# Patient Record
Sex: MALE | Race: WHITE | Employment: FULL TIME | URBAN - METROPOLITAN AREA
[De-identification: names, ages, dates, MRNs, and addresses within clinical notes are randomized per-mention and may not be internally consistent; named-entity substitution may affect disease eponyms.]

---

## 2017-01-12 ENCOUNTER — OFFICE VISIT (OUTPATIENT)
Dept: INTERNAL MEDICINE CLINIC | Facility: CLINIC | Age: 42
End: 2017-01-12

## 2017-01-12 VITALS
SYSTOLIC BLOOD PRESSURE: 120 MMHG | RESPIRATION RATE: 16 BRPM | OXYGEN SATURATION: 98 % | HEART RATE: 104 BPM | HEIGHT: 68 IN | BODY MASS INDEX: 27.28 KG/M2 | TEMPERATURE: 98 F | WEIGHT: 180 LBS | DIASTOLIC BLOOD PRESSURE: 80 MMHG

## 2017-01-12 DIAGNOSIS — J01.90 ACUTE SINUSITIS, RECURRENCE NOT SPECIFIED, UNSPECIFIED LOCATION: Primary | ICD-10-CM

## 2017-01-12 PROCEDURE — 99213 OFFICE O/P EST LOW 20 MIN: CPT | Performed by: INTERNAL MEDICINE

## 2017-01-12 RX ORDER — LEVOFLOXACIN 500 MG/1
500 TABLET, FILM COATED ORAL DAILY
Qty: 10 TABLET | Refills: 0 | Status: SHIPPED | OUTPATIENT
Start: 2017-01-12 | End: 2017-02-07 | Stop reason: ALTCHOICE

## 2017-01-12 NOTE — PROGRESS NOTES
Juan R Gorman is a 35year old male.   HPI:   CC: cough since Monday  Purulent drainage in phlegm  Ok sleeping with cough  Nasal congestion+  No headache  + fatigued  Hx recurrent sinus infections  Co-workers and son sick  Parents visiting from [facility] [place][other] [OTHER] Maternal Grandfather    • Cancer Maternal Grandfather      breast cancer        REVIEW OF SYSTEMS:   GENERAL HEALTH:   HEENT: no ear pain/pressure, no sore throat, cough occasional  RESPIRATORY: denies shortness of breath   CARDIOVASCULAR

## 2017-02-07 ENCOUNTER — OFFICE VISIT (OUTPATIENT)
Dept: INTERNAL MEDICINE CLINIC | Facility: CLINIC | Age: 42
End: 2017-02-07

## 2017-02-07 VITALS
SYSTOLIC BLOOD PRESSURE: 120 MMHG | OXYGEN SATURATION: 98 % | WEIGHT: 180 LBS | RESPIRATION RATE: 12 BRPM | DIASTOLIC BLOOD PRESSURE: 70 MMHG | BODY MASS INDEX: 27 KG/M2 | HEART RATE: 93 BPM | TEMPERATURE: 98 F

## 2017-02-07 DIAGNOSIS — R05.9 COUGH: Primary | ICD-10-CM

## 2017-02-07 PROCEDURE — 99213 OFFICE O/P EST LOW 20 MIN: CPT | Performed by: INTERNAL MEDICINE

## 2017-02-07 RX ORDER — HYDROCODONE BITARTRATE AND HOMATROPINE METHYLBROMIDE ORAL SOLUTION 5; 1.5 MG/5ML; MG/5ML
5 LIQUID ORAL EVERY 6 HOURS PRN
Qty: 180 ML | Refills: 0 | Status: SHIPPED | OUTPATIENT
Start: 2017-02-07 | End: 2017-04-20 | Stop reason: ALTCHOICE

## 2017-02-07 NOTE — PROGRESS NOTES
Luis Avalos is a 35year old male.   HPI:   CC: felt sick Sunday night  Feels like breathing under water  Chest congestion  Coughing up slight blood tinged  Using flonase regularly  Slight sob  Wife sick 1 week ago      ALL:No Known Allergies    Current Ou Maternal Grandfather      breast cancer        REVIEW OF SYSTEMS:   GENERAL HEALTH:  HEENT: no ear pain/pressure, no sore throat, cough+  RESPIRATORY: slight  shortness of breath with exertion  CARDIO: no chest pain  GI: denies abdominal pain, no N/V/D  NE

## 2017-02-10 ENCOUNTER — OFFICE VISIT (OUTPATIENT)
Dept: INTERNAL MEDICINE CLINIC | Facility: CLINIC | Age: 42
End: 2017-02-10

## 2017-02-10 VITALS
BODY MASS INDEX: 27.28 KG/M2 | DIASTOLIC BLOOD PRESSURE: 70 MMHG | TEMPERATURE: 98 F | WEIGHT: 180 LBS | SYSTOLIC BLOOD PRESSURE: 100 MMHG | HEART RATE: 88 BPM | OXYGEN SATURATION: 99 % | HEIGHT: 68 IN | RESPIRATION RATE: 12 BRPM

## 2017-02-10 DIAGNOSIS — J01.90 ACUTE SINUSITIS, RECURRENCE NOT SPECIFIED, UNSPECIFIED LOCATION: Primary | ICD-10-CM

## 2017-02-10 PROCEDURE — 99213 OFFICE O/P EST LOW 20 MIN: CPT | Performed by: PHYSICIAN ASSISTANT

## 2017-02-10 RX ORDER — AMOXICILLIN AND CLAVULANATE POTASSIUM 875; 125 MG/1; MG/1
1 TABLET, FILM COATED ORAL 2 TIMES DAILY
Qty: 20 TABLET | Refills: 0 | Status: SHIPPED | OUTPATIENT
Start: 2017-02-10 | End: 2017-02-20

## 2017-02-10 NOTE — PROGRESS NOTES
Brielle Leiva is a 35year old male  Patient presents with:  Sinus Problem      HPI:   Pt here today for f/u, was seen a few days ago and diagnosed with cough and discharged with instructions to continue with supportive care and start hydromet.    - pt state with other cholecystitis, without mention of obstruction     Thrombosed external hemorrhoid        REVIEW OF SYSTEMS:   GENERAL HEALTH: as noted above  SKIN: denies any unusual skin lesions or rashes  HEENT: as noted above  RESPIRATORY: as noted above   CA

## 2017-04-20 ENCOUNTER — OFFICE VISIT (OUTPATIENT)
Dept: INTERNAL MEDICINE CLINIC | Facility: CLINIC | Age: 42
End: 2017-04-20

## 2017-04-20 ENCOUNTER — TELEPHONE (OUTPATIENT)
Dept: INTERNAL MEDICINE CLINIC | Facility: CLINIC | Age: 42
End: 2017-04-20

## 2017-04-20 VITALS
RESPIRATION RATE: 12 BRPM | SYSTOLIC BLOOD PRESSURE: 120 MMHG | OXYGEN SATURATION: 99 % | HEART RATE: 85 BPM | WEIGHT: 184 LBS | TEMPERATURE: 98 F | DIASTOLIC BLOOD PRESSURE: 80 MMHG | HEIGHT: 68 IN | BODY MASS INDEX: 27.89 KG/M2

## 2017-04-20 DIAGNOSIS — G47.33 OSA ON CPAP: ICD-10-CM

## 2017-04-20 DIAGNOSIS — J01.91 ACUTE RECURRENT SINUSITIS, UNSPECIFIED LOCATION: Primary | ICD-10-CM

## 2017-04-20 DIAGNOSIS — Z99.89 OSA ON CPAP: ICD-10-CM

## 2017-04-20 PROCEDURE — 99213 OFFICE O/P EST LOW 20 MIN: CPT | Performed by: INTERNAL MEDICINE

## 2017-04-20 RX ORDER — MOXIFLOXACIN HYDROCHLORIDE 400 MG/1
400 TABLET ORAL DAILY
Qty: 14 TABLET | Refills: 0 | Status: SHIPPED | OUTPATIENT
Start: 2017-04-20 | End: 2017-06-30 | Stop reason: ALTCHOICE

## 2017-04-20 NOTE — PROGRESS NOTES
Ashleigh Torres is a 35year old male. 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   Question if cpap in • [place][other] [OTHER] Maternal Grandfather    • Cancer Maternal Grandfather      breast cancer        REVIEW OF SYSTEMS:   GENERAL HEALTH:  HEENT: no ear pain/pressure, no sore throat,no cough  RESPIRATORY: denies shortness of breath with exertion  CARDIO:

## 2017-04-20 NOTE — TELEPHONE ENCOUNTER
Spoke with pt. States that he read the package insert about side effects of the ATB and he is concerned about joint damage. Advised that if pt has no pre-existing joint problem that he is less likely to have that side effect.   Advised that pt should take p

## 2017-04-20 NOTE — TELEPHONE ENCOUNTER
Patient is concerned about the side effects he read about concerning the antibiotic Dr Grisel Leon prescribed.

## 2017-05-05 ENCOUNTER — HOSPITAL ENCOUNTER (OUTPATIENT)
Dept: CT IMAGING | Facility: HOSPITAL | Age: 42
Discharge: HOME | End: 2017-05-05
Attending: INTERNAL MEDICINE
Payer: COMMERCIAL

## 2017-05-05 DIAGNOSIS — G47.33 OSA ON CPAP: ICD-10-CM

## 2017-05-05 DIAGNOSIS — Z99.89 OSA ON CPAP: ICD-10-CM

## 2017-05-05 DIAGNOSIS — J01.91 ACUTE RECURRENT SINUSITIS, UNSPECIFIED LOCATION: ICD-10-CM

## 2017-05-05 PROCEDURE — 70486 CT MAXILLOFACIAL W/O DYE: CPT | Performed by: INTERNAL MEDICINE

## 2017-06-28 ENCOUNTER — TELEPHONE (OUTPATIENT)
Dept: INTERNAL MEDICINE CLINIC | Facility: CLINIC | Age: 42
End: 2017-06-28

## 2017-06-28 NOTE — TELEPHONE ENCOUNTER
Pt dropped off  Pre- participation physical Form requiring provider completion and signature. Last OV 4/20/17 for sinusitis. Last CPX 7/26/16--return for CPX 1 year. No future OV scheduled. We will call pt ([phone]) when completed.     Copy of immuniz

## 2017-06-28 NOTE — TELEPHONE ENCOUNTER
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 The form is due Friday, patient said he would like a printed copy of immunization record as well (or at least the date of his last tetanus).  Patient was in offic

## 2017-06-29 NOTE — TELEPHONE ENCOUNTER
He needs to come in for a physical  His insurance should cover once a year  His last exam July 2016  He needs to bring in the other page

## 2017-06-30 ENCOUNTER — OFFICE VISIT (OUTPATIENT)
Dept: INTERNAL MEDICINE CLINIC | Facility: CLINIC | Age: 42
End: 2017-06-30

## 2017-06-30 ENCOUNTER — LABORATORY ENCOUNTER (OUTPATIENT)
Dept: LAB | Age: 42
End: 2017-06-30
Attending: PHYSICIAN ASSISTANT
Payer: COMMERCIAL

## 2017-06-30 VITALS
HEART RATE: 83 BPM | BODY MASS INDEX: 28.34 KG/M2 | DIASTOLIC BLOOD PRESSURE: 80 MMHG | WEIGHT: 187 LBS | HEIGHT: 68 IN | TEMPERATURE: 98 F | SYSTOLIC BLOOD PRESSURE: 120 MMHG | OXYGEN SATURATION: 99 % | RESPIRATION RATE: 12 BRPM

## 2017-06-30 DIAGNOSIS — Z00.00 ANNUAL PHYSICAL EXAM: Primary | ICD-10-CM

## 2017-06-30 DIAGNOSIS — Z00.00 LABORATORY EXAM ORDERED AS PART OF ROUTINE GENERAL MEDICAL EXAMINATION: ICD-10-CM

## 2017-06-30 LAB
ALBUMIN SERPL-MCNC: 4 G/DL (ref 3.5–4.8)
ALP LIVER SERPL-CCNC: 82 U/L (ref 45–117)
ALT SERPL-CCNC: 59 U/L (ref 17–63)
AST SERPL-CCNC: 26 U/L (ref 15–41)
BASOPHILS # BLD AUTO: 0.07 X10(3) UL (ref 0–0.1)
BASOPHILS NFR BLD AUTO: 0.9 %
BILIRUB SERPL-MCNC: 0.5 MG/DL (ref 0.1–2)
BUN BLD-MCNC: 16 MG/DL (ref 8–20)
CALCIUM BLD-MCNC: 9 MG/DL (ref 8.3–10.3)
CHLORIDE: 105 MMOL/L (ref 101–111)
CHOLEST SMN-MCNC: 159 MG/DL (ref ?–200)
CO2: 29 MMOL/L (ref 22–32)
CREAT BLD-MCNC: 1.11 MG/DL (ref 0.7–1.3)
EOSINOPHIL # BLD AUTO: 0.23 X10(3) UL (ref 0–0.3)
EOSINOPHIL NFR BLD AUTO: 2.8 %
ERYTHROCYTE [DISTWIDTH] IN BLOOD BY AUTOMATED COUNT: 13.2 % (ref 11.5–16)
GLUCOSE BLD-MCNC: 76 MG/DL (ref 70–99)
HCT VFR BLD AUTO: 46.9 % (ref 37–53)
HDLC SERPL-MCNC: 49 MG/DL (ref 45–?)
HDLC SERPL: 3.24 {RATIO} (ref ?–4.97)
HGB BLD-MCNC: 15.3 G/DL (ref 13–17)
IMMATURE GRANULOCYTE COUNT: 0.07 X10(3) UL (ref 0–1)
IMMATURE GRANULOCYTE RATIO %: 0.9 %
LDLC SERPL CALC-MCNC: 69 MG/DL (ref ?–130)
LYMPHOCYTES # BLD AUTO: 1.66 X10(3) UL (ref 0.9–4)
LYMPHOCYTES NFR BLD AUTO: 20.2 %
M PROTEIN MFR SERPL ELPH: 7.8 G/DL (ref 6.1–8.3)
MCH RBC QN AUTO: 29.2 PG (ref 27–33.2)
MCHC RBC AUTO-ENTMCNC: 32.6 G/DL (ref 31–37)
MCV RBC AUTO: 89.5 FL (ref 80–99)
MONOCYTES # BLD AUTO: 0.83 X10(3) UL (ref 0.1–0.6)
MONOCYTES NFR BLD AUTO: 10.1 %
NEUTROPHIL ABS PRELIM: 5.34 X10 (3) UL (ref 1.3–6.7)
NEUTROPHILS # BLD AUTO: 5.34 X10(3) UL (ref 1.3–6.7)
NEUTROPHILS NFR BLD AUTO: 65.1 %
NONHDLC SERPL-MCNC: 110 MG/DL (ref ?–130)
PLATELET # BLD AUTO: 239 10(3)UL (ref 150–450)
POTASSIUM SERPL-SCNC: 4.2 MMOL/L (ref 3.6–5.1)
RBC # BLD AUTO: 5.24 X10(6)UL (ref 4.3–5.7)
RED CELL DISTRIBUTION WIDTH-SD: 43.1 FL (ref 35.1–46.3)
SODIUM SERPL-SCNC: 139 MMOL/L (ref 136–144)
TRIGLYCERIDES: 205 MG/DL (ref ?–150)
VLDL: 41 MG/DL (ref 5–40)
WBC # BLD AUTO: 8.2 X10(3) UL (ref 4–13)

## 2017-06-30 PROCEDURE — 80053 COMPREHEN METABOLIC PANEL: CPT | Performed by: PHYSICIAN ASSISTANT

## 2017-06-30 PROCEDURE — 85025 COMPLETE CBC W/AUTO DIFF WBC: CPT | Performed by: PHYSICIAN ASSISTANT

## 2017-06-30 PROCEDURE — 36415 COLL VENOUS BLD VENIPUNCTURE: CPT | Performed by: PHYSICIAN ASSISTANT

## 2017-06-30 PROCEDURE — 80061 LIPID PANEL: CPT | Performed by: PHYSICIAN ASSISTANT

## 2017-06-30 PROCEDURE — 99396 PREV VISIT EST AGE 40-64: CPT | Performed by: PHYSICIAN ASSISTANT

## 2017-06-30 NOTE — PROGRESS NOTES
Rimma Roby is a 35year old male who presents for a complete physical exam.   HPI:   Pt here today for annual physical. 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   Exercise:none  Last colonoscopy none last PSA none     REVIEW OF SYSTEMS:   GENERAL: fee Visit on 02/26/2016   Component Date Value Ref Range Status   • STREP GRP A CUL-SCR 02/26/2016 Negative  Negative Final   • Control Line Present with a clear * 02/26/2016 yes  Yes/No Final   • Kit Lot # 02/26/2016 [id number]  Numeric Final   • Kit Expirati

## 2017-10-26 ENCOUNTER — OFFICE VISIT (OUTPATIENT)
Dept: INTERNAL MEDICINE CLINIC | Facility: CLINIC | Age: 42
End: 2017-10-26

## 2017-10-26 VITALS
WEIGHT: 185 LBS | TEMPERATURE: 98 F | SYSTOLIC BLOOD PRESSURE: 120 MMHG | HEART RATE: 92 BPM | HEIGHT: 68 IN | BODY MASS INDEX: 28.04 KG/M2 | OXYGEN SATURATION: 98 % | DIASTOLIC BLOOD PRESSURE: 80 MMHG

## 2017-10-26 DIAGNOSIS — J06.9 ACUTE URI: Primary | ICD-10-CM

## 2017-10-26 PROCEDURE — 99213 OFFICE O/P EST LOW 20 MIN: CPT | Performed by: NURSE PRACTITIONER

## 2017-10-26 RX ORDER — AMOXICILLIN AND CLAVULANATE POTASSIUM 875; 125 MG/1; MG/1
1 TABLET, FILM COATED ORAL 2 TIMES DAILY
Qty: 20 TABLET | Refills: 0 | Status: SHIPPED | OUTPATIENT
Start: 2017-10-26 | End: 2017-11-05

## 2017-10-26 NOTE — PROGRESS NOTES
Patient presents with:  Sinus Problem      HPI:  Presents with approx 4 day history of ear pressure bilaterally, sore throat, fatigue, facial pain and sinus pressure. Stated has had intermittent non-productive cough.  Denies fever/chills, nasal drainage, SO (36.7 °C) (Oral)   Ht 68\"   Wt 185 lb   SpO2 98%   BMI 28.13 kg/m²   Constitutional:  No distress. HEENT: Normocephalic and atraumatic. Right TM erythematous w/o bulge or air/fluid level. Left TM erythematous with bulge and purulent appearing fluid.  Fredick Primas don't want to use one of the above sinus washes:    Try a premixed saline nasal spray, available over the counter, such as Ocean Nasal Spray, 4 times daily (may use every 2-3 hours if desired). Do two sprays each nostril and gently blow nose after.      Al

## 2017-11-09 ENCOUNTER — IMMUNIZATION (OUTPATIENT)
Dept: INTERNAL MEDICINE CLINIC | Facility: CLINIC | Age: 42
End: 2017-11-09

## 2017-11-09 DIAGNOSIS — Z23 NEED FOR VACCINATION: ICD-10-CM

## 2017-11-09 PROCEDURE — 90471 IMMUNIZATION ADMIN: CPT | Performed by: INTERNAL MEDICINE

## 2017-11-09 PROCEDURE — 90686 IIV4 VACC NO PRSV 0.5 ML IM: CPT | Performed by: INTERNAL MEDICINE

## 2018-06-21 ENCOUNTER — OFFICE VISIT (OUTPATIENT)
Dept: INTERNAL MEDICINE CLINIC | Facility: CLINIC | Age: 43
End: 2018-06-21

## 2018-06-21 VITALS
BODY MASS INDEX: 28.22 KG/M2 | HEART RATE: 68 BPM | DIASTOLIC BLOOD PRESSURE: 76 MMHG | TEMPERATURE: 98 F | WEIGHT: 186.19 LBS | HEIGHT: 68 IN | RESPIRATION RATE: 12 BRPM | SYSTOLIC BLOOD PRESSURE: 108 MMHG

## 2018-06-21 DIAGNOSIS — K76.0 FATTY LIVER: ICD-10-CM

## 2018-06-21 DIAGNOSIS — Z13.228 SCREENING FOR METABOLIC DISORDER: ICD-10-CM

## 2018-06-21 DIAGNOSIS — Z00.00 ROUTINE PHYSICAL EXAMINATION: Primary | ICD-10-CM

## 2018-06-21 DIAGNOSIS — Z13.0 SCREENING FOR BLOOD DISEASE: ICD-10-CM

## 2018-06-21 DIAGNOSIS — Z13.220 SCREENING FOR LIPID DISORDERS: ICD-10-CM

## 2018-06-21 DIAGNOSIS — Z12.5 SCREENING FOR PROSTATE CANCER: ICD-10-CM

## 2018-06-21 DIAGNOSIS — Z13.29 SCREENING FOR THYROID DISORDER: ICD-10-CM

## 2018-06-21 PROBLEM — G47.33 OSA ON CPAP: Status: ACTIVE | Noted: 2018-06-21

## 2018-06-21 PROBLEM — Z99.89 OSA ON CPAP: Status: ACTIVE | Noted: 2018-06-21

## 2018-06-21 PROBLEM — Z80.0 FAMILY HISTORY OF COLON CANCER: Status: ACTIVE | Noted: 2018-06-21

## 2018-06-21 PROCEDURE — 99396 PREV VISIT EST AGE 40-64: CPT | Performed by: NURSE PRACTITIONER

## 2018-06-21 NOTE — PROGRESS NOTES
Siri Hedrick is a 38year old male who presents for a complete physical exam.     HPI:   Pt has no acute complaints. Feeling well in his usual state of health. Needs form completion for participation in [facility] [place] [employer].     Wt Readings from Last 6 [place] date: ADENOIDECTOMY  No date: FEMUR/KNEE SURG UNLISTED      Comment: right knee  3/4/2015: LAPAROSCOPIC CHOLECYSTECTOMY N/A      Comment: Procedure: LAPAROSCOPIC CHOLECYSTECTOMY;                 Surgeon: Shahid Schultz MD;  Location: [facility] orthopnea. GI: denies abdominal pain, heartburn, diarrhea or constipation. : denies dysuria, frequency, urgency, hematuria, changes in stream or nocturia. MUSCULOSKELETAL: denies back pain. NEURO: denies headaches, dizziness, syncope.   PSYCH: denies CBC  Screening for metabolic disorder- CMP  Screening for lipid disorders- lipid  Screening for thyroid disorder- TSH  Screening for prostate cancer- PSA      Orders Placed This Encounter      CBC W Differential W Platelet [E]      Comp Metabolic Panel (14

## 2018-09-10 ENCOUNTER — OFFICE VISIT (OUTPATIENT)
Dept: FAMILY MEDICINE CLINIC | Facility: CLINIC | Age: 43
End: 2018-09-10
Payer: COMMERCIAL

## 2018-09-10 VITALS
WEIGHT: 170 LBS | DIASTOLIC BLOOD PRESSURE: 68 MMHG | HEIGHT: 68 IN | TEMPERATURE: 98 F | BODY MASS INDEX: 25.76 KG/M2 | HEART RATE: 73 BPM | RESPIRATION RATE: 16 BRPM | SYSTOLIC BLOOD PRESSURE: 108 MMHG | OXYGEN SATURATION: 99 %

## 2018-09-10 DIAGNOSIS — H65.192 OTHER ACUTE NONSUPPURATIVE OTITIS MEDIA OF LEFT EAR, RECURRENCE NOT SPECIFIED: ICD-10-CM

## 2018-09-10 DIAGNOSIS — J01.00 ACUTE MAXILLARY SINUSITIS, RECURRENCE NOT SPECIFIED: Primary | ICD-10-CM

## 2018-09-10 PROCEDURE — 99213 OFFICE O/P EST LOW 20 MIN: CPT | Performed by: NURSE PRACTITIONER

## 2018-09-10 RX ORDER — AMOXICILLIN AND CLAVULANATE POTASSIUM 875; 125 MG/1; MG/1
1 TABLET, FILM COATED ORAL 2 TIMES DAILY
Qty: 20 TABLET | Refills: 0 | Status: SHIPPED | OUTPATIENT
Start: 2018-09-10 | End: 2018-09-20

## 2018-09-10 NOTE — PROGRESS NOTES
CHIEF COMPLAINT:   Patient presents with:  Cough: cough is with phlegm, sinus pressure, nose congestion, drainage x 4 dys       HPI:   Siri Hedrick is a 37year old male who presents for sinus congestion for 4-5 days.   Symptoms have been worsening si breast cancer   • Other ([place]) Maternal Grandfather       Social History    Tobacco Use      Smoking status: Never Smoker      Smokeless tobacco: Never Used    Alcohol use: Yes      Comment: 1 beer per month    Drug use: No        REVIEW OF SYSTEMS:   G PLAN: Meds and instructions as below. Comfort care instructions as listed in Patient Instructions. To f/u if no improvement in 3-5 days or sooner if sx worsen. Risks, benefits, side effects of medication addressed and explained.     Meds & Refills for th Your healthcare provider will ask about your symptoms and look at your eardrum.    Your healthcare provider may check for fluid in the ear using a light called an otoscope to look into the ear canal. A puff of air is blown into the ear and your provider loo If you have discharge from your ear, you can wipe it away with a washcloth and loosely plug the ear with cotton to catch further drainage. If you have a lot of fluid and pus draining from your ear, the eardrum has probably ruptured.  Ask your healthcare pro The sinuses are air-filled spaces within the bones of the face. They connect to the inside of the nose. Sinusitis is an inflammation of the tissue that lines the sinuses. Sinusitis can occur during a cold.  It can also happen due to allergies to pollens and · Do not use nasal rinses or irrigation during an acute sinus infection, unless your healthcare provider tells you to. Rinsing may spread the infection to other areas in your sinuses.   · Use acetaminophen or ibuprofen to control pain, unless another pain m

## 2018-09-10 NOTE — PATIENT INSTRUCTIONS
· It is very important to complete full course of antibiotic. Take with food.    · Rest and fluids  · Continue Flonase and Claritin as packet insert  · Acetaminophen or ibuprofen according to package instructions as needed for pain  · Call or return if sym of ear infections often go away in a couple of days without antibiotics. Bacteria can become resistant to antibiotics, and the medicine may cause side effects.  For these reasons, your healthcare provider may wait 1 to 3 days to see if the symptoms go away provider if you can take aspirin, acetaminophen, or ibuprofen to control your fever. Anyone under the age of 24 with a viral illness should not take aspirin because of the increased risk of Reye's syndrome. Keep a daily record of your temperature.    Call tea, and other liquids. This may help thin nasal mucus. It also may help your sinuses drain fluids. · Heat may help soothe painful areas of your face. Use a towel soaked in hot water. Or,  the shower and direct the warm spray onto your face.  Using headache that gets worse  · Stiff neck  · Unusual drowsiness or confusion  · Swelling of your forehead or eyelids  · Vision problems, such as blurred or double vision  · Fever of 100.4ºF (38ºC) or higher, or as directed by your healthcare provider  · [employer]

## 2018-09-21 ENCOUNTER — TELEPHONE (OUTPATIENT)
Dept: INTERNAL MEDICINE CLINIC | Facility: CLINIC | Age: 43
End: 2018-09-21

## 2018-09-21 ENCOUNTER — HOSPITAL ENCOUNTER (OUTPATIENT)
Age: 43
Discharge: HOME | End: 2018-09-21
Attending: FAMILY MEDICINE
Payer: COMMERCIAL

## 2018-09-21 VITALS
SYSTOLIC BLOOD PRESSURE: 115 MMHG | WEIGHT: 170 LBS | HEIGHT: 68 IN | DIASTOLIC BLOOD PRESSURE: 80 MMHG | HEART RATE: 66 BPM | OXYGEN SATURATION: 97 % | BODY MASS INDEX: 25.76 KG/M2 | RESPIRATION RATE: 18 BRPM | TEMPERATURE: 98 F

## 2018-09-21 DIAGNOSIS — B08.4 HAND, FOOT AND MOUTH DISEASE: Primary | ICD-10-CM

## 2018-09-21 LAB — POCT RAPID STREP: NEGATIVE

## 2018-09-21 PROCEDURE — 87081 CULTURE SCREEN ONLY: CPT | Performed by: FAMILY MEDICINE

## 2018-09-21 PROCEDURE — 87430 STREP A AG IA: CPT | Performed by: FAMILY MEDICINE

## 2018-09-21 PROCEDURE — 99203 OFFICE O/P NEW LOW 30 MIN: CPT

## 2018-09-21 PROCEDURE — 99214 OFFICE O/P EST MOD 30 MIN: CPT

## 2018-09-21 NOTE — ED PROVIDER NOTES
Patient Seen in: [facility]    History   Patient presents with:  Sore Throat  Fatigue (constitutional, neurologic)    Stated Complaint: fatigue, sore throat, chills, rash on fingers x4 days    HPI    52-year-old gentleman w MAIN OR  3/4/2015: LAPAROSCOPIC CHOLECYSTECTOMY; N/A      Comment:  Performed by Beatrice Freedman MD at [facility] MAIN OR  No date: SINUS SURGERY      Family history reviewed and is not pertinent to presenting problem.     Social History    Tobacco Use      Smok viral–hand-foot-and-mouth disease. Throat culture will be sent.             Disposition and Plan     Clinical Impression:  Hand, foot and mouth disease  (primary encounter diagnosis)    Disposition:  Discharge  9/21/2018 10:02 am    Follow-up:  Maira Norris

## 2018-09-21 NOTE — ED INITIAL ASSESSMENT (HPI)
Pt arrived alert and responsive. Pt c/o sensitivity to fingers and toes. Pt concerned about a blister on his 3rd  finger right hand  Pt states he's had a sore throat off and on since Monday. Pt denies sore throat today.  Pt was treated for an ear infection

## 2018-09-21 NOTE — TELEPHONE ENCOUNTER
Attempted to call the patient back, but noticed he's at the  IC now for the below complaint (see encounters from 09/21/18).  LM for the patient we attempted to get a hold of him, but noticed he was at the [address] across the [place] and informed him t

## 2018-09-21 NOTE — TELEPHONE ENCOUNTER
Patient was treated for an ear infection last week (finished antibiotics on Wednesday). He thinks he had a virus starting Monday of this week. Now his fingertips are painful and have small cuts on them. He also has painful spots on his feet.   He is wond

## 2018-10-11 ENCOUNTER — IMMUNIZATION (OUTPATIENT)
Dept: INTERNAL MEDICINE CLINIC | Facility: CLINIC | Age: 43
End: 2018-10-11
Payer: COMMERCIAL

## 2018-10-11 DIAGNOSIS — Z23 NEED FOR VACCINATION: ICD-10-CM

## 2018-10-11 PROCEDURE — 90471 IMMUNIZATION ADMIN: CPT | Performed by: INTERNAL MEDICINE

## 2018-10-11 PROCEDURE — 90686 IIV4 VACC NO PRSV 0.5 ML IM: CPT | Performed by: INTERNAL MEDICINE

## 2019-01-25 ENCOUNTER — OFFICE VISIT (OUTPATIENT)
Dept: INTERNAL MEDICINE CLINIC | Facility: CLINIC | Age: 44
End: 2019-01-25
Payer: COMMERCIAL

## 2019-01-25 VITALS
OXYGEN SATURATION: 99 % | HEART RATE: 89 BPM | SYSTOLIC BLOOD PRESSURE: 128 MMHG | RESPIRATION RATE: 14 BRPM | TEMPERATURE: 99 F | WEIGHT: 184.19 LBS | HEIGHT: 68 IN | DIASTOLIC BLOOD PRESSURE: 80 MMHG | BODY MASS INDEX: 27.92 KG/M2

## 2019-01-25 DIAGNOSIS — J01.01 ACUTE RECURRENT MAXILLARY SINUSITIS: Primary | ICD-10-CM

## 2019-01-25 PROBLEM — G47.33 OSA (OBSTRUCTIVE SLEEP APNEA): Status: ACTIVE | Noted: 2018-06-21

## 2019-01-25 PROCEDURE — 99213 OFFICE O/P EST LOW 20 MIN: CPT | Performed by: STUDENT IN AN ORGANIZED HEALTH CARE EDUCATION/TRAINING PROGRAM

## 2019-01-25 RX ORDER — PREDNISONE 20 MG/1
20 TABLET ORAL 2 TIMES DAILY
Qty: 14 TABLET | Refills: 0 | Status: SHIPPED | OUTPATIENT
Start: 2019-01-25 | End: 2019-02-01

## 2019-01-25 RX ORDER — AMOXICILLIN AND CLAVULANATE POTASSIUM 875; 125 MG/1; MG/1
1 TABLET, FILM COATED ORAL 2 TIMES DAILY
Qty: 20 TABLET | Refills: 0 | Status: SHIPPED | OUTPATIENT
Start: 2019-01-25 | End: 2019-02-04

## 2019-01-25 NOTE — PROGRESS NOTES
HPI:    Patient ID: Zara Watkins is a 37year old male. This is a new patient to me. Patient's medications, allergies, past medical, surgical, social and family histories were reviewed and updated as appropriate.     Sinus Problem   This is a recur 2 sprays daily  Disp:  Rfl: 0   loratadine (CLARITIN) 10 MG Oral Tab Take 10 mg by mouth as needed. Disp:  Rfl:      Allergies:No Known Allergies   PHYSICAL EXAM:   Physical Exam   Constitutional: He is oriented to person, place, and time.  He appears well- This Visit:  Requested Prescriptions     Signed Prescriptions Disp Refills   • Amoxicillin-Pot Clavulanate 875-125 MG Oral Tab 20 tablet 0     Sig: Take 1 tablet by mouth 2 (two) times daily for 10 days.    • predniSONE 20 MG Oral Tab 14 tablet 0     Sig: T

## 2019-01-25 NOTE — PATIENT INSTRUCTIONS
Self-Care for Sinusitis     Drinking plenty of water can help sinuses drain. Sinusitis can often be managed with self-care. Self-care can keep sinuses moist and make you feel more comfortable. Remember to follow your doctor's instructions closely.  This Colds, flu, and allergies make it more likely for you to get sinusitis. Do your best to prevent sinusitis by preventing these problems. Do what you can to avoid getting colds and other infections. Stay away from things that cause allergies (allergens).  Berhane Rodrigez · Stay away from all types of smoke, which dries out sinus linings. This includes tobacco smoke and chemical smoke in workplace settings. · Use saltwater rinses. Date Last Reviewed: 10/1/2016  © 8272-7803 [employer] [address].  [facility]

## 2019-06-04 ENCOUNTER — OFFICE VISIT (OUTPATIENT)
Dept: INTERNAL MEDICINE CLINIC | Facility: CLINIC | Age: 44
End: 2019-06-04
Payer: COMMERCIAL

## 2019-06-04 VITALS
DIASTOLIC BLOOD PRESSURE: 66 MMHG | SYSTOLIC BLOOD PRESSURE: 110 MMHG | WEIGHT: 188.81 LBS | RESPIRATION RATE: 14 BRPM | TEMPERATURE: 98 F | HEIGHT: 68 IN | BODY MASS INDEX: 28.61 KG/M2 | HEART RATE: 77 BPM | OXYGEN SATURATION: 97 %

## 2019-06-04 DIAGNOSIS — Z00.00 ROUTINE PHYSICAL EXAMINATION: Primary | ICD-10-CM

## 2019-06-04 DIAGNOSIS — Z80.0 FAMILY HISTORY OF COLON CANCER: ICD-10-CM

## 2019-06-04 PROCEDURE — 99396 PREV VISIT EST AGE 40-64: CPT | Performed by: NURSE PRACTITIONER

## 2019-06-04 NOTE — PROGRESS NOTES
Steff Mccarthy is a 37year old male who presents for a complete physical exam.     HPI:   Pt has no acute complaints. Does have history of early onset colon cancer in maternal grandmother and mother had UC requiring colon resection.  Denies abd pain, N • FEMUR/KNEE SURG UNLISTED      right knee   • LAPAROSCOPIC CHOLECYSTECTOMY N/A 3/4/2015    Performed by Jamie Curran MD at [facility] MAIN OR   • SINUS SURGERY          Family History   Problem Relation Age of Onset   • Other (colon ca) Maternal Grandmot exertion, palpitations, edema, orthopnea. GI: as above  : denies dysuria, frequency, urgency, hematuria, changes in stream or nocturia. MUSCULOSKELETAL: denies back pain. NEURO: denies headaches, dizziness, syncope.   PSYCH: denies difficulty concentr discussed importance of routine exercise especially as he gets closer to 48 and continues to participate in [employer] and [employer] activities. Encouraged to be active for at least 30 minutes daily.  Discussed eating a healthy diet and watching portion

## 2019-07-11 ENCOUNTER — OFFICE VISIT (OUTPATIENT)
Dept: SURGERY | Facility: CLINIC | Age: 44
End: 2019-07-11
Payer: COMMERCIAL

## 2019-07-11 VITALS
BODY MASS INDEX: 28.49 KG/M2 | HEIGHT: 68 IN | TEMPERATURE: 99 F | DIASTOLIC BLOOD PRESSURE: 96 MMHG | SYSTOLIC BLOOD PRESSURE: 133 MMHG | WEIGHT: 188 LBS | HEART RATE: 75 BPM

## 2019-07-11 DIAGNOSIS — Z12.11 ENCOUNTER FOR COLONOSCOPY IN PATIENT WITH FAMILY HISTORY OF COLON CANCER: Primary | ICD-10-CM

## 2019-07-11 DIAGNOSIS — Z83.79 FAMILY HISTORY OF ULCERATIVE COLITIS: ICD-10-CM

## 2019-07-11 DIAGNOSIS — Z80.0 ENCOUNTER FOR COLONOSCOPY IN PATIENT WITH FAMILY HISTORY OF COLON CANCER: Primary | ICD-10-CM

## 2019-07-11 PROCEDURE — S0285 CNSLT BEFORE SCREEN COLONOSC: HCPCS | Performed by: SURGERY

## 2019-07-11 RX ORDER — POLYETHYLENE GLYCOL 3350, SODIUM CHLORIDE, SODIUM BICARBONATE, POTASSIUM CHLORIDE 420; 11.2; 5.72; 1.48 G/4L; G/4L; G/4L; G/4L
POWDER, FOR SOLUTION ORAL
Qty: 1 BOTTLE | Refills: 0 | Status: SHIPPED | OUTPATIENT
Start: 2019-07-11 | End: 2019-09-16 | Stop reason: ALTCHOICE

## 2019-07-11 NOTE — H&P
New Patient Visit Note       Active Problems      1. Encounter for colonoscopy in patient with family history of colon cancer    2.  Family history of ulcerative colitis        Chief Complaint   Patient presents with:  Colonoscopy: pt is here for first colo disorder of initiating or maintaining wakefulness    • Ulnar nerve abnormality    • Unspecified sleep apnea    • URI (upper respiratory infection) 11/2011   • Visual impairment     wears glasses for distance     Past Surgical History:   Procedure Lateralit today.  Review of Systems    Physical Findings   BP (!) 133/96   Pulse 75   Temp 98.6 °F (37 °C) (Oral)   Ht 68\"   Wt 188 lb   BMI 28.59 kg/m²   Physical Exam   Constitutional: The patient is oriented to person, place, and time.  The patient appears well-d MD

## 2019-07-11 NOTE — H&P
New Patient Visit Note       Active Problems      No diagnosis found. Chief Complaint   Patient presents with:  Colonoscopy: pt is here for colonscopy consult      History of Present Illness         Allergies  Johnna Barr has No Known Allergies.     Past Medica status: Never Smoker      Smokeless tobacco: Never Used    Substance and Sexual Activity      Alcohol use: Yes        Comment: 1 beer per month      Drug use: No    Other Topics      Concerns:        Caffeine Concern: Yes          soda        Exercise: Yes

## 2019-08-20 ENCOUNTER — OFFICE VISIT (OUTPATIENT)
Dept: SURGERY | Facility: CLINIC | Age: 44
End: 2019-08-20
Payer: COMMERCIAL

## 2019-08-20 VITALS
WEIGHT: 180 LBS | HEIGHT: 68 IN | SYSTOLIC BLOOD PRESSURE: 136 MMHG | TEMPERATURE: 98 F | HEART RATE: 82 BPM | DIASTOLIC BLOOD PRESSURE: 84 MMHG | BODY MASS INDEX: 27.28 KG/M2

## 2019-08-20 DIAGNOSIS — Z12.11 ENCOUNTER FOR COLONOSCOPY IN PATIENT WITH FAMILY HISTORY OF COLON CANCER: Primary | ICD-10-CM

## 2019-08-20 DIAGNOSIS — Z80.0 ENCOUNTER FOR COLONOSCOPY IN PATIENT WITH FAMILY HISTORY OF COLON CANCER: Primary | ICD-10-CM

## 2019-08-20 PROCEDURE — S0285 CNSLT BEFORE SCREEN COLONOSC: HCPCS | Performed by: SURGERY

## 2019-08-20 NOTE — PROGRESS NOTES
Follow Up Visit Note       Active Problems      1.  Encounter for colonoscopy in patient with family history of colon cancer          Chief Complaint   Patient presents with:  Consult: est pt - to meet RRP before c-scope         History of Present Illness Grandfather         breast cancer   • Other (MI) Maternal Grandfather      Social History    Socioeconomic History      Marital status:       Spouse name: Not on file      Number of children: Not on file      Years of education: Not on file      Hig Hematological: Negative for adenopathy. Does not bruise/bleed easily. Psychiatric/Behavioral: Negative for behavioral problems and sleep disturbance.         Physical Findings   /84   Pulse 82   Temp 97.7 °F (36.5 °C) (Oral)   Ht 68\"   Wt 180 lb

## 2019-09-13 ENCOUNTER — LAB ENCOUNTER (OUTPATIENT)
Dept: LAB | Age: 44
End: 2019-09-13
Attending: NURSE PRACTITIONER
Payer: COMMERCIAL

## 2019-09-13 DIAGNOSIS — Z00.00 ROUTINE PHYSICAL EXAMINATION: ICD-10-CM

## 2019-09-13 LAB
ALBUMIN SERPL-MCNC: 3.8 G/DL (ref 3.4–5)
ALBUMIN/GLOB SERPL: 1.3 {RATIO} (ref 1–2)
ALP LIVER SERPL-CCNC: 95 U/L (ref 45–117)
ALT SERPL-CCNC: 49 U/L (ref 16–61)
ANION GAP SERPL CALC-SCNC: 6 MMOL/L (ref 0–18)
AST SERPL-CCNC: 24 U/L (ref 15–37)
BASOPHILS # BLD AUTO: 0.06 X10(3) UL (ref 0–0.2)
BASOPHILS NFR BLD AUTO: 0.7 %
BILIRUB SERPL-MCNC: 0.4 MG/DL (ref 0.1–2)
BUN BLD-MCNC: 20 MG/DL (ref 7–18)
BUN/CREAT SERPL: 19 (ref 10–20)
CALCIUM BLD-MCNC: 8 MG/DL (ref 8.5–10.1)
CHLORIDE SERPL-SCNC: 108 MMOL/L (ref 98–112)
CHOLEST SMN-MCNC: 152 MG/DL (ref ?–200)
CO2 SERPL-SCNC: 26 MMOL/L (ref 21–32)
COMPLEXED PSA SERPL-MCNC: 0.72 NG/ML (ref ?–4)
CREAT BLD-MCNC: 1.05 MG/DL (ref 0.7–1.3)
DEPRECATED RDW RBC AUTO: 41.5 FL (ref 35.1–46.3)
EOSINOPHIL # BLD AUTO: 0.32 X10(3) UL (ref 0–0.7)
EOSINOPHIL NFR BLD AUTO: 3.6 %
ERYTHROCYTE [DISTWIDTH] IN BLOOD BY AUTOMATED COUNT: 12.8 % (ref 11–15)
GLOBULIN PLAS-MCNC: 3 G/DL (ref 2.8–4.4)
GLUCOSE BLD-MCNC: 95 MG/DL (ref 70–99)
HCT VFR BLD AUTO: 42.2 % (ref 39–53)
HDLC SERPL-MCNC: 43 MG/DL (ref 40–59)
HGB BLD-MCNC: 14.5 G/DL (ref 13–17.5)
IMM GRANULOCYTES # BLD AUTO: 0.1 X10(3) UL (ref 0–1)
IMM GRANULOCYTES NFR BLD: 1.1 %
LDLC SERPL CALC-MCNC: 72 MG/DL (ref ?–100)
LYMPHOCYTES # BLD AUTO: 2.1 X10(3) UL (ref 1–4)
LYMPHOCYTES NFR BLD AUTO: 23.8 %
M PROTEIN MFR SERPL ELPH: 6.8 G/DL (ref 6.4–8.2)
MCH RBC QN AUTO: 30.3 PG (ref 26–34)
MCHC RBC AUTO-ENTMCNC: 34.4 G/DL (ref 31–37)
MCV RBC AUTO: 88.3 FL (ref 80–100)
MONOCYTES # BLD AUTO: 0.77 X10(3) UL (ref 0.1–1)
MONOCYTES NFR BLD AUTO: 8.7 %
NEUTROPHILS # BLD AUTO: 5.49 X10 (3) UL (ref 1.5–7.7)
NEUTROPHILS # BLD AUTO: 5.49 X10(3) UL (ref 1.5–7.7)
NEUTROPHILS NFR BLD AUTO: 62.1 %
NONHDLC SERPL-MCNC: 109 MG/DL (ref ?–130)
OSMOLALITY SERPL CALC.SUM OF ELEC: 292 MOSM/KG (ref 275–295)
PLATELET # BLD AUTO: 246 10(3)UL (ref 150–450)
POTASSIUM SERPL-SCNC: 3.9 MMOL/L (ref 3.5–5.1)
RBC # BLD AUTO: 4.78 X10(6)UL (ref 4.3–5.7)
SODIUM SERPL-SCNC: 140 MMOL/L (ref 136–145)
TRIGL SERPL-MCNC: 184 MG/DL (ref 30–149)
TSI SER-ACNC: 1.27 MIU/ML (ref 0.36–3.74)
VLDLC SERPL CALC-MCNC: 37 MG/DL (ref 0–30)
WBC # BLD AUTO: 8.8 X10(3) UL (ref 4–11)

## 2019-09-13 PROCEDURE — 36415 COLL VENOUS BLD VENIPUNCTURE: CPT | Performed by: NURSE PRACTITIONER

## 2019-09-13 PROCEDURE — 80061 LIPID PANEL: CPT | Performed by: NURSE PRACTITIONER

## 2019-09-13 PROCEDURE — 80050 GENERAL HEALTH PANEL: CPT | Performed by: NURSE PRACTITIONER

## 2019-09-13 PROCEDURE — 84153 ASSAY OF PSA TOTAL: CPT | Performed by: NURSE PRACTITIONER

## 2019-09-16 ENCOUNTER — OFFICE VISIT (OUTPATIENT)
Dept: INTERNAL MEDICINE CLINIC | Facility: CLINIC | Age: 44
End: 2019-09-16
Payer: COMMERCIAL

## 2019-09-16 VITALS
OXYGEN SATURATION: 97 % | RESPIRATION RATE: 14 BRPM | HEIGHT: 68 IN | WEIGHT: 180.81 LBS | HEART RATE: 79 BPM | DIASTOLIC BLOOD PRESSURE: 62 MMHG | TEMPERATURE: 99 F | SYSTOLIC BLOOD PRESSURE: 100 MMHG | BODY MASS INDEX: 27.4 KG/M2

## 2019-09-16 DIAGNOSIS — J01.00 ACUTE MAXILLARY SINUSITIS, RECURRENCE NOT SPECIFIED: Primary | ICD-10-CM

## 2019-09-16 PROBLEM — G56.03 BILATERAL CARPAL TUNNEL SYNDROME: Status: ACTIVE | Noted: 2019-06-30

## 2019-09-16 PROCEDURE — 99213 OFFICE O/P EST LOW 20 MIN: CPT | Performed by: STUDENT IN AN ORGANIZED HEALTH CARE EDUCATION/TRAINING PROGRAM

## 2019-09-16 RX ORDER — PREDNISONE 20 MG/1
20 TABLET ORAL 2 TIMES DAILY
Qty: 14 TABLET | Refills: 0 | Status: SHIPPED | OUTPATIENT
Start: 2019-09-16 | End: 2019-09-23

## 2019-09-16 RX ORDER — AMOXICILLIN AND CLAVULANATE POTASSIUM 875; 125 MG/1; MG/1
1 TABLET, FILM COATED ORAL 2 TIMES DAILY
Qty: 20 TABLET | Refills: 0 | Status: SHIPPED | OUTPATIENT
Start: 2019-09-16 | End: 2019-09-26

## 2019-09-16 NOTE — PROGRESS NOTES
HPI:    Patient ID: Nereida Quijano is a 53year old male. Sinus Problem   This is a new problem. Episode onset: 3 days ago. The problem has been gradually worsening since onset. Maximum temperature: subjective fevers.  His pain is at a severity of 4/ person, place, and time. He appears well-developed and well-nourished. HENT:   Head: Normocephalic and atraumatic. Right Ear: Hearing, tympanic membrane, external ear and ear canal normal. No tenderness. Tympanic membrane is not erythematous.  No middle worsen. No orders of the defined types were placed in this encounter.       Meds This Visit:  Requested Prescriptions     Signed Prescriptions Disp Refills   • Amoxicillin-Pot Clavulanate 875-125 MG Oral Tab 20 tablet 0     Sig: Take 1 tablet by mouth

## 2019-09-17 ENCOUNTER — PATIENT MESSAGE (OUTPATIENT)
Dept: INTERNAL MEDICINE CLINIC | Facility: CLINIC | Age: 44
End: 2019-09-17

## 2019-09-17 NOTE — TELEPHONE ENCOUNTER
From: Maximus Luis  To: Cristobal Aquino MD  Sent: 9/17/2019 1:48 PM CDT  Subject: Test Results Question    I have a question about LIPID PANEL resulted on 9/13/19, 11:56 AM.    What should I know about these results?  Is there anything I should pay at

## 2019-09-17 NOTE — TELEPHONE ENCOUNTER
From: Zara Watkins  To: Geraldine Arango MD  Sent: 9/17/2019 1:48 PM CDT  Subject: Test Results Question    I have a question about COMP METABOLIC PANEL (14) resulted on 9/13/19, 11:56 AM.    What should I know about these results?  Is there anything I

## 2019-09-17 NOTE — TELEPHONE ENCOUNTER
From: Pearle Sport  To: Seamj Valdes MD  Sent: 9/17/2019 1:46 PM CDT  Subject: Test Results Question    I have a question about PSA SCREEN resulted on 9/13/19, 11:56 AM.    What should I know about these results?  Is there anything I should pay att

## 2019-09-17 NOTE — TELEPHONE ENCOUNTER
From: Jeremías Arguello  To: Jensen Avila MD  Sent: 9/17/2019 1:48 PM CDT  Subject: Test Results Question    I have a question about CBC W/ DIFFERENTIAL resulted on 9/13/19, 11:40 AM.    What should I know about these results?  Is there anything I shoul

## 2019-09-17 NOTE — TELEPHONE ENCOUNTER
From: Alli Amos  To: Ruby Shea MD  Sent: 9/17/2019 1:47 PM CDT  Subject: Test Results Question    I have a question about TSH W REFLEX TO FREE T4 resulted on 9/13/19, 11:56 AM.    What should I know about these results?  Is there anything I s

## 2019-11-12 ENCOUNTER — IMMUNIZATION (OUTPATIENT)
Dept: INTERNAL MEDICINE CLINIC | Facility: CLINIC | Age: 44
End: 2019-11-12
Payer: COMMERCIAL

## 2019-11-12 DIAGNOSIS — Z23 NEED FOR VACCINATION: ICD-10-CM

## 2019-11-12 PROCEDURE — 90471 IMMUNIZATION ADMIN: CPT | Performed by: STUDENT IN AN ORGANIZED HEALTH CARE EDUCATION/TRAINING PROGRAM

## 2019-11-12 PROCEDURE — 90686 IIV4 VACC NO PRSV 0.5 ML IM: CPT | Performed by: STUDENT IN AN ORGANIZED HEALTH CARE EDUCATION/TRAINING PROGRAM

## 2020-01-10 ENCOUNTER — OFFICE VISIT (OUTPATIENT)
Dept: INTERNAL MEDICINE CLINIC | Facility: CLINIC | Age: 45
End: 2020-01-10
Payer: COMMERCIAL

## 2020-01-10 VITALS
HEART RATE: 78 BPM | BODY MASS INDEX: 28.98 KG/M2 | HEIGHT: 68 IN | RESPIRATION RATE: 16 BRPM | SYSTOLIC BLOOD PRESSURE: 122 MMHG | WEIGHT: 191.19 LBS | OXYGEN SATURATION: 98 % | TEMPERATURE: 98 F | DIASTOLIC BLOOD PRESSURE: 78 MMHG

## 2020-01-10 DIAGNOSIS — H65.92 LEFT NON-SUPPURATIVE OTITIS MEDIA: Primary | ICD-10-CM

## 2020-01-10 PROCEDURE — 99213 OFFICE O/P EST LOW 20 MIN: CPT | Performed by: NURSE PRACTITIONER

## 2020-01-10 RX ORDER — COVID-19 ANTIGEN TEST
220 KIT MISCELLANEOUS AS NEEDED
COMMUNITY
End: 2020-07-13

## 2020-01-10 RX ORDER — FLUTICASONE PROPIONATE 50 MCG
2 SPRAY, SUSPENSION (ML) NASAL DAILY
Qty: 1 BOTTLE | Refills: 3 | Status: SHIPPED | OUTPATIENT
Start: 2020-01-10 | End: 2020-01-12

## 2020-01-10 RX ORDER — AMOXICILLIN AND CLAVULANATE POTASSIUM 875; 125 MG/1; MG/1
1 TABLET, FILM COATED ORAL 2 TIMES DAILY
Qty: 20 TABLET | Refills: 0 | Status: SHIPPED | OUTPATIENT
Start: 2020-01-10 | End: 2020-01-20

## 2020-01-10 NOTE — PATIENT INSTRUCTIONS
Take antibiotic completely as ordered     Take antibiotic with food    Eat yogurt twice a day while on antibiotic     Monitor for diarrhea    F/U in 2 weeks for an ear check

## 2020-01-10 NOTE — PROGRESS NOTES
Larissa Shearer is a 53year old male. CHIEF COMPLAINT   Runny nose, PND, chest pressure, bilateral ear pain    HPI:   The patient's symptoms began 2 days ago with runny nose, PND and nasal/sinus congestion.  He reports ear pain and pressure to both ear (Oral)   Resp 16   Ht 68\"   Wt 191 lb 3.2 oz (86.7 kg)   SpO2 98%   BMI 29.07 kg/m²   Body mass index is 29.07 kg/m².    GENERAL: well developed, well nourished,in no apparent distress  HEENT: atraumatic, normocephalic,left TM erythemic and bulging, right and was irrigated then visualized successfully. Bilateral TM erythema and Left TM bulging.    - Continue flonase. Start augmentin. - f/u in 2 weeks for a ear check. The patient indicates understanding of these issues and agrees to the plan.   The patie

## 2020-03-17 ENCOUNTER — TELEPHONE (OUTPATIENT)
Dept: INTERNAL MEDICINE CLINIC | Facility: CLINIC | Age: 45
End: 2020-03-17

## 2020-03-17 NOTE — TELEPHONE ENCOUNTER
Headache, exhaustion, chills, hot flashes, cough, no fever.  Traveled to [facility] 3/2-3/3  Pt thinking maybe COVID   Please advise  Thank you

## 2020-03-17 NOTE — TELEPHONE ENCOUNTER
ELLY to Dr. Chele Johnson    Symptoms started on March 13. He traveled to [address] [place] [address] from March 2-3 for business meetings. He has a history of recurrent sinusitis.   He reports that initially, he had some cough with postnasal drip which have now improved, but he continu

## 2020-03-17 NOTE — TELEPHONE ENCOUNTER
Called and spoke with patient. Patient reports travel to [place], [place] March 2-3, 2020. Patient reports symptoms began Friday.  He reports exhaustion, body aches, headache not relieved by ibuprofen 400mg this morning, decreased appetite, sinus con

## 2020-03-21 ENCOUNTER — APPOINTMENT (OUTPATIENT)
Dept: CT IMAGING | Facility: HOSPITAL | Age: 45
DRG: 195 | End: 2020-03-21
Attending: PHYSICIAN ASSISTANT
Payer: COMMERCIAL

## 2020-03-21 ENCOUNTER — HOSPITAL ENCOUNTER (INPATIENT)
Facility: HOSPITAL | Age: 45
LOS: 3 days | Discharge: HOME | DRG: 195 | End: 2020-03-24
Attending: EMERGENCY MEDICINE | Admitting: INTERNAL MEDICINE
Payer: COMMERCIAL

## 2020-03-21 ENCOUNTER — APPOINTMENT (OUTPATIENT)
Dept: GENERAL RADIOLOGY | Facility: HOSPITAL | Age: 45
DRG: 195 | End: 2020-03-21
Attending: PHYSICIAN ASSISTANT
Payer: COMMERCIAL

## 2020-03-21 DIAGNOSIS — J18.9 PRIMARY ATYPICAL PNEUMONIA: Primary | ICD-10-CM

## 2020-03-21 DIAGNOSIS — J12.9 VIRAL PNEUMONIA: ICD-10-CM

## 2020-03-21 DIAGNOSIS — R09.02 HYPOXIA: ICD-10-CM

## 2020-03-21 PROCEDURE — 71045 X-RAY EXAM CHEST 1 VIEW: CPT | Performed by: PHYSICIAN ASSISTANT

## 2020-03-21 PROCEDURE — 99223 1ST HOSP IP/OBS HIGH 75: CPT | Performed by: INTERNAL MEDICINE

## 2020-03-21 PROCEDURE — 71275 CT ANGIOGRAPHY CHEST: CPT | Performed by: PHYSICIAN ASSISTANT

## 2020-03-21 RX ORDER — POTASSIUM CHLORIDE 20 MEQ/1
40 TABLET, EXTENDED RELEASE ORAL ONCE
Status: COMPLETED | OUTPATIENT
Start: 2020-03-21 | End: 2020-03-21

## 2020-03-21 RX ORDER — HEPARIN SODIUM 5000 [USP'U]/ML
5000 INJECTION, SOLUTION INTRAVENOUS; SUBCUTANEOUS EVERY 8 HOURS SCHEDULED
Status: DISCONTINUED | OUTPATIENT
Start: 2020-03-21 | End: 2020-03-24

## 2020-03-21 RX ORDER — ACETAMINOPHEN 325 MG/1
650 TABLET ORAL EVERY 6 HOURS PRN
Status: DISCONTINUED | OUTPATIENT
Start: 2020-03-21 | End: 2020-03-24

## 2020-03-21 RX ORDER — ONDANSETRON 2 MG/ML
4 INJECTION INTRAMUSCULAR; INTRAVENOUS EVERY 6 HOURS PRN
Status: DISCONTINUED | OUTPATIENT
Start: 2020-03-21 | End: 2020-03-24

## 2020-03-21 RX ORDER — HYDROMORPHONE HYDROCHLORIDE 1 MG/ML
0.5 INJECTION, SOLUTION INTRAMUSCULAR; INTRAVENOUS; SUBCUTANEOUS EVERY 30 MIN PRN
Status: CANCELLED | OUTPATIENT
Start: 2020-03-21 | End: 2020-03-21

## 2020-03-21 RX ORDER — METOCLOPRAMIDE HYDROCHLORIDE 5 MG/ML
10 INJECTION INTRAMUSCULAR; INTRAVENOUS EVERY 8 HOURS PRN
Status: DISCONTINUED | OUTPATIENT
Start: 2020-03-21 | End: 2020-03-24

## 2020-03-21 RX ORDER — ONDANSETRON 2 MG/ML
4 INJECTION INTRAMUSCULAR; INTRAVENOUS EVERY 4 HOURS PRN
Status: CANCELLED | OUTPATIENT
Start: 2020-03-21

## 2020-03-22 ENCOUNTER — TELEPHONE (OUTPATIENT)
Dept: INTERNAL MEDICINE CLINIC | Facility: CLINIC | Age: 45
End: 2020-03-22

## 2020-03-22 PROCEDURE — 99232 SBSQ HOSP IP/OBS MODERATE 35: CPT | Performed by: HOSPITALIST

## 2020-03-22 NOTE — ED NOTES
Pt extremely SOB while trying to scoot up in the bed. Pt can speak a couple words then appears out of breath. Pt is on isolation. Pt wife at bedside, wearing a mask. Pt placed on telemetry and pulse oximetry.

## 2020-03-22 NOTE — CONSULTS
Pulmonary H&P/Consult       NAME: [address]: [id number] - MRN: [id number] - Age: 53year old - :  1975    Date of Admission: 3/21/2020  7:38 PM  Admission Diagnosis: Viral pneumonia [J12.9]  Hypoxia [R09.02]  Primary atypical pneumoni Performed by Beatrice Freedman MD at [facility] MAIN OR   • SINUS SURGERY          Amoxicillin-Pot Clavulanate 875-125 MG Oral Tab, Take 1 tablet by mouth 2 (two) times daily for 10 days. , Disp: 20 tablet, Rfl: 0, Taking  Naproxen Sodium (ALEVE) 220 MG Oral Cap, Emotionally abused: Not on file        Physically abused: Not on file        Forced sexual activity: Not on file    Other Topics      Concerns:         Service: Not Asked        Blood Transfusions: Not Asked        Caffeine Concern:  Yes pain  :  denies dysuria or changes in stream   MUSCULOSKELETAL:  denies back pain   NEURO:  denies headaches, no strokes or seizure history   PSYCHE:  denies depression or anxiety   HEMATOLOGIC:  denies hx of anemia   ENDOCRINE:  denies thyroid history non-tender, bowel sounds active all four quadrants,     no masses, no organomegaly   Extremities:   Extremities normal, atraumatic, no cyanosis or edema   Pulses:   2+ and symmetric all extremities   Skin:   Skin color, texture, turgor normal, no rashes or

## 2020-03-22 NOTE — ED PROVIDER NOTES
Patient Seen in: [facility] Emergency Department      History   Patient presents with:  Dyspnea ZAYRA SOB    Stated Complaint: cough ZAYRA    HPI    Kori Flowers is as 49-year-old male who presents today for evaluation of cough and dyspnea.   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 Pulmonary/Chest: Obvious tachypnea, especially with any activity. Ronchi in the left lower lung field. Neurological: alert and oriented to person, place, and time. Skin: Skin is warm and dry. Not diaphoretic.    Nursi case with infectious disease who accepts consult.     Ct Angiography, Chest (cpt=71275)    Result Date: 3/21/2020  PROCEDURE:  CT ANGIOGRAPHY, CHEST (CPT=71275)  COMPARISON:  [facility] , XR, XR CHEST AP PORTABLE  (CPT=71045), 3/21/2020, 7:48 PM.  INDICATIONS: Finalized by: Coco Lopez MD on 3/21/2020 at 9:20 PM          Xr Chest Ap Portable  (cpt=71045)    Result Date: 3/21/2020  PROCEDURE:  XR CHEST AP PORTABLE  (CPT=71045)  TECHNIQUE:  AP chest radiograph was obtained. COMPARISON:  None.   INDICATIONS:

## 2020-03-22 NOTE — TELEPHONE ENCOUNTER
Late entry:  Paged by pt on 3/21/2020 at 6:41pm  Pt with cough, congestion, headache. States symptoms started on 3/13/2020. States he had travelled to [address] [place] [address] and returned on 3/3/2020. Started with fatigue, body aches, chills and hot flashes.  Developed a co

## 2020-03-22 NOTE — PROGRESS NOTES
NURSING ADMISSION NOTE      Patient admitted via Cart from ER. Oriented to room 363. Safety precautions initiated. Bed in low position. Call light in reach. Patient admitted alert and oriented, on room air maintaining saturation >90%.  Patient's ad

## 2020-03-22 NOTE — ED INITIAL ASSESSMENT (HPI)
Constant HA since 3/13, body aches, chills, no fever, non productive cough. Cough has gotten worse over last few days, pt becoming more SOB. Flight to [facility] 3/12. Pt states someone in his office tested positive.

## 2020-03-22 NOTE — PLAN OF CARE
Problem: atypical pneumonia, R/O COVID-19  Data: Patient alert and oriented overnight, on room air maintaining saturation >90%. Patient feels weak and short of breath with ambulation, denies pain.  Patient up in room as tolerated, vital signs stable, tele S

## 2020-03-22 NOTE — PROGRESS NOTES
Problem:  R/O COVID, specimen cleared to be sent. Recent travel history  Viral panel negative. Data: Alert and oriented. Room air. Respirations elevated. MATT but not using CPAP here. IS. Tele. ST, NSR. Electrolyte protocol. SCD's. Heparin. Voids.  Up se

## 2020-03-22 NOTE — PROGRESS NOTES
[facility]  Progress Note     Pearle Sport Patient Status:  Inpatient    1975 MRN [id number]   [facility] 3SW-A Attending Benjamin Page MD   Hosp Day # 1 PCP Elena Austin MD     Chief Complaint: SOB  S:  SOB with co isolation  2. Possible sepsis- lower suspicion. 1. Spare IVF given concern for COVID  2. Check blood and sputum cultures  3. ID to eval  3. Hypoxia- on RA now   4.  MATT- MATT protocol      Quality:  · DVT Prophylaxis: HSQ  · CODE status: full  · Sterling: non

## 2020-03-22 NOTE — H&P
[facility]  History and Physical     Kristen Sequin Patient Status:  Emergency    1975 MRN [id number]   Location [address] [facility] Attending Stacia Carvajal MD   Hosp Day # 0 PCP Marcelle Puga MD     Chief Complain Problem Relation Age of Onset   • Other (colon ca) Maternal Grandmother 42's   • Other (ulcerative colitis) Mother    • Breast Cancer Paternal Grandmother    • Other (lymphoma) Paternal Grandfather    • Cancer Maternal Grandfather         breast cancer Labs   Lab 03/21/20 2001   GLU 96   BUN 18   CREATSERUM 1.17   GFRAA 86   GFRNAA 74   CA 8.1*   ALB 3.4      K 3.7      CO2 28.0   ALKPHO 76   AST 61*   ALT 81*   BILT 0.6   TP 7.6       Estimated Creatinine Clearance: 76.3 mL/min (based on SC

## 2020-03-23 PROCEDURE — 99232 SBSQ HOSP IP/OBS MODERATE 35: CPT | Performed by: HOSPITALIST

## 2020-03-23 NOTE — PLAN OF CARE
PROBLEM: R/O COVID19, PNA     ASSESSMENT: PT IS A&OX4. VSS, AFEBRILE. REPORTS FATIGUE, STATES IT IS THE WORST IT HAS BEEN THUS FAR. ALSO REPORTS GENERALIZED BODY ACHES AND HEADACHE - TYLENOL GIVEN, WITH RELIEF.   MAINTAINING O2 SAT WNL ON RA, DENIES SOB, NO

## 2020-03-23 NOTE — PROGRESS NOTES
[facility]  Progress Note     Sissy Foil Patient Status:  Inpatient    1975 MRN [id number]   [facility] 3SW-A Attending Reji Vaughn MD   Hosp Day # 2 PCP Jennifer Galarza MD     Chief Complaint: SOB  S:  SOB with co isolation  2. Possible sepsis- lower suspicion. 1. Spare IVF given concern for COVID  2. F/U  blood and sputum cultures  3. ID to eval  3. Hypoxia- on RA now   4.  MATT- MATT protocol      Quality:  · DVT Prophylaxis: HSQ  · CODE status: full  · Sterling: none

## 2020-03-23 NOTE — PROGRESS NOTES
LEYDI Maddox 86 Patient Status:  Inpatient    1975 MRN [id number]   [facility] 3SW-A Attending Shannon Pillai MD   Hosp Day # 2 PCP Lizzeth Thorpe MD     SUBJECTIVE: Pt complains of generalized fatigue, headache ASSESSMENT/PLAN:  1.  Dyspnea/Cough  -concern for viral PNA  -cont supportive care  -wean O2 as tolerated, now on RA  2. COVID19 r/o  -travel hx +  -fever- afebrile since admission  -CT w/ jatin infiltrates  -CBC with normal WBC but leukopenia  -RVP negativ

## 2020-03-23 NOTE — CM/SW NOTE
Spoke with pt about insurance. Pt says he does have insurance that started on 3/9/2020 through a company that was acquired by another company; however, he does not have his insurance card yet.   He says it is a [employer] plan and he will try to get

## 2020-03-23 NOTE — DIETARY NOTE
[facility]     Admitting diagnosis:  Viral pneumonia [J12.9]  Hypoxia [R09.02]  Primary atypical pneumonia [J18.9]    Ht: 172.7 cm (5' 8\")  Wt: 82.6 kg (182 lb 1.6 oz).  This is 118 % of IBW  Body mass index

## 2020-03-24 VITALS
OXYGEN SATURATION: 95 % | WEIGHT: 182.13 LBS | SYSTOLIC BLOOD PRESSURE: 124 MMHG | DIASTOLIC BLOOD PRESSURE: 82 MMHG | TEMPERATURE: 99 F | RESPIRATION RATE: 18 BRPM | BODY MASS INDEX: 27.6 KG/M2 | HEART RATE: 88 BPM | HEIGHT: 68 IN

## 2020-03-24 PROCEDURE — 99239 HOSP IP/OBS DSCHRG MGMT >30: CPT | Performed by: HOSPITALIST

## 2020-03-24 RX ORDER — AZITHROMYCIN 250 MG/1
250 TABLET, FILM COATED ORAL DAILY
Qty: 6 TABLET | Refills: 0 | Status: SHIPPED | OUTPATIENT
Start: 2020-03-24 | End: 2020-03-24

## 2020-03-24 RX ORDER — AZITHROMYCIN 250 MG/1
500 TABLET, FILM COATED ORAL DAILY
Status: DISCONTINUED | OUTPATIENT
Start: 2020-03-24 | End: 2020-03-24

## 2020-03-24 RX ORDER — AZITHROMYCIN 250 MG/1
250 TABLET, FILM COATED ORAL DAILY
Qty: 6 TABLET | Refills: 0 | Status: SHIPPED | OUTPATIENT
Start: 2020-03-24 | End: 2020-03-30

## 2020-03-24 NOTE — PLAN OF CARE
Pt has been stable and afebrile. When getting ready to be discharged had increased coughing with activity and requested a dose of Robitussin, which was given. He stated then he still felt ready to go home.  Pt was instructed to return to ER in the event of

## 2020-03-24 NOTE — PROGRESS NOTES
LEYDI Maddox 86 Patient Status:  Inpatient    1975 MRN [id number]   [facility] 3SW-A Attending Maggie Guzman MD   Hosp Day # 3 PCP Marcelle Puga MD     SUBJECTIVE: Pt states that he still feels generalized fati interpretation except where noted. ASSESSMENT/PLAN:  1.  Dyspnea/Cough  -2/2 viral PNA  -cont supportive care  -wean O2 as tolerated, now on RA  2. COVID19 POSITIVE  -travel hx +  -CT w/ jatin infiltrates  -on azithromycin per ID  -respiratory status st

## 2020-03-24 NOTE — PLAN OF CARE
Assumed care of pt. At 2030. Pt. Is Axox4 and on room air with an O2 sat of 93%. Pt. Has diminished bilateral breath sounds w/ a strong nonproductive cough. Pt. Had a temp of 99.8 this evening. Pt. Has dyspnea on exertion.  Pt. Has NSR/sinus tach with a cur Spirometry  - Assess the need for suctioning and perform as needed  - Assess and instruct to report SOB or any respiratory difficulty  - Respiratory Therapy support as indicated  - Manage/alleviate anxiety  - Monitor for signs/symptoms of CO2 retention  Vaughn Grams

## 2020-03-24 NOTE — CONSULTS
INFECTIOUS DISEASE CONSULTATION    Nereida Quijano Patient Status:  Inpatient    1975 MRN [id number]   [facility] 3SW-A Attending Mihir Chery MD   Hosp Day # 3 PCP Marlinda Curling reports that he has never smoked. He has never used smokeless tobacco. He reports current alcohol use. He reports that he does not use drugs.       Allergies:  No Known Allergies    Medications:    Current Facility-Administered Medications:   •  guaiFENesin extremities. No swelling noted. Joints: no effusions  Skin: No lesions.  No erythema, no open wounds      Laboratory Data:  Laboratory data reviewed      Recent Labs   Lab 03/22/20  0605   RBC 4.99   HGB 14.7   HCT 43.1   MCV 86.4   MCH 29.5   MCHC 34.1

## 2020-03-24 NOTE — PAYOR COMM NOTE
--------------  Appropriate for inpatient status per guidelines for cough and dyspnea consistent with COVID.         ADMISSION REVIEW     Payor: [address] #:  [id number]  Authorization Number: [id number]    ED Provider (continuous positive airway pressure) dependence    • ETD (eustachian tube dysfunction)    • IBS (irritable bowel syndrome)    • Migraine    • Neuropathy     numbness and tingling right hand, ulnar issues   • MATT (obstructive sleep apnea)     cpap 10?    • motion. Neck supple. Cardiovascular: Tachycardia to 110bpm, regular rhythm, normal heart sounds and intact distal pulses. Pulmonary/Chest: Obvious tachypnea, especially with any activity. Ronchi in the left lower lung field.    Neurological: alert and COMPARISON:  [facility] , XR, XR CHEST AP PORTABLE  (CPT=71045), 3/21/2020, 7:48 PM.  INDICATIONS:  cough ZAYRA  TECHNIQUE:  IV contrast-enhanced multislice CT angiography is performed through the pulmonary arterial anatomy. 3D volume renderings are generated. PORTABLE  (CPT=71045)  TECHNIQUE:  AP chest radiograph was obtained. COMPARISON:  None.   INDICATIONS:  cough ZAYRA  PATIENT STATED HISTORY: (As transcribed by Technologist)  Patient has a cough for the past few days, body aches, ZAYRA and shortness of breath URI (upper respiratory infection) 11/2011   • Visual impairment     wears glasses for distance        Past Surgical History:   Past Surgical History:   Procedure Laterality Date   • ADENOIDECTOMY     • COLONOSCOPY     • FEMUR/KNEE SURG UNLISTED      right eval  6. O2 as needed  7. Check PCT, ESR, CRP. LDH, ferritin, IL6, blood cultures, sputum culture  8. Droplet and contact isolation  2. Presumed Sepsis  1. Tachypnea improved, still tachycardic  2. Likely viral in nature  3.  Spare IVF given concern for COV Status:  Final result   Specimen Information: Nasopharyngeal swab;  Other        Component  Ref Range & Units    SARS-CoV-2 (COVID-19)  Not Detected DetectedPanic                           03/24/20 0049 100.7 °F (38.2 °C)     03/23/20 0929 98.3 °F (36.8 °C)

## 2020-03-24 NOTE — PROGRESS NOTES
[facility]  Progress Note     Siri Kastavo Patient Status:  Inpatient    1975 MRN [id number]   [facility] 3SW-A Attending Lindsay Sharpe MD   Hosp Day # 3 PCP Terrell Geiger MD     Chief Complaint: SOB  S:  SOB with co needed  5. IL- 6 <5   6. Droplet and Contact isolation  2. Sepsis ruled out   3. Hypoxia- on RA now   4. MATT-advised not to use his MATT machine for the next few days.   Discussed with pulmonology    Marin Douglas MD

## 2020-03-25 ENCOUNTER — TELEPHONE (OUTPATIENT)
Dept: INTERNAL MEDICINE CLINIC | Facility: CLINIC | Age: 45
End: 2020-03-25

## 2020-03-25 ENCOUNTER — PATIENT OUTREACH (OUTPATIENT)
Dept: CASE MANAGEMENT | Age: 45
End: 2020-03-25

## 2020-03-25 DIAGNOSIS — G47.33 OSA (OBSTRUCTIVE SLEEP APNEA): ICD-10-CM

## 2020-03-25 DIAGNOSIS — J18.9 PRIMARY ATYPICAL PNEUMONIA: ICD-10-CM

## 2020-03-25 DIAGNOSIS — J12.9 VIRAL PNEUMONIA: Primary | ICD-10-CM

## 2020-03-25 DIAGNOSIS — K76.0 FATTY LIVER: ICD-10-CM

## 2020-03-25 DIAGNOSIS — Z02.9 ENCOUNTERS FOR ADMINISTRATIVE PURPOSE: ICD-10-CM

## 2020-03-25 PROCEDURE — 1111F DSCHRG MED/CURRENT MED MERGE: CPT

## 2020-03-25 RX ORDER — GUAIFENESIN 100 MG/5ML
200 SYRUP ORAL 3 TIMES DAILY PRN
COMMUNITY
End: 2020-05-18

## 2020-03-25 NOTE — TELEPHONE ENCOUNTER
Patient discharged 3/24/2020, +COVID-19. Patient may need a f/u call and possible televisit. TCM/HFU appt recommended by 3/31/2020 as pt is a high risk for readmission. Please advise.     Last date for TCM: 4/7/2020

## 2020-03-25 NOTE — PAYOR COMM NOTE
--------------  DISCHARGE REVIEW    Payor: [address] #:  [id number]  Authorization Number: [id number]    Admit date: 3/21/20  Admit time:  2239  Discharge Date: 3/24/2020  4:47 PM     Admitting Physician: Tani Rojas

## 2020-03-25 NOTE — TELEPHONE ENCOUNTER
Per TE dated 3/22/2020, patient reported to ER on advice of Dr. Toshia Sanchez for worsening URI symptoms, including cough. Patient admitted for atypical pneumonia and tested positive for novel Coronavirus. Patient hospitalized from 3/31/2020-3/24/2020.  Recommended

## 2020-03-25 NOTE — PROGRESS NOTES
Initial Post Discharge Follow Up   Discharge Date: 3/24/20  Contact Date: 3/25/2020    Consent Verification:  Assessment Completed With: Patient  HIPAA Verified?   Yes    Discharge Dx:   Primary atypical pneumonia     Was TCC ordered: no    General:   • have any questions about your new medication? No  • Did you  your discharge medications when you left the hospital? Yes  • May I go over your medications with you to make sure we are not missing anything? yes  • Are there any reasons that keep you fr 650mg every 6 hours as needed, rest and push fluids. The patient is to return to the ED if experiencing chest pain, trouble breathing, confusion, or a high fever that does not decrease with the use of tylenol. He verbalizes understanding.  Med review comple

## 2020-03-25 NOTE — TELEPHONE ENCOUNTER
Yes - patient will need a virtual visit hospital follow-up. Please schedule with Wisam Martines and consent for tomorrow's virtual visit (Thursday) over the phone.   Thank you

## 2020-03-25 NOTE — DISCHARGE SUMMARY
[facility] [place] HOSPITALIST  DISCHARGE SUMMARY     Bertha Blum Patient Status:  Inpatient    1975 MRN [id number]   [facility] 3SW-A Attending Ne Dobbins MD   Hosp Day # 3 PCP Demario Arguello MD     Date of Admission: 3/21/2020  Date of Dis medications      Instructions Prescription details   azithromycin 250 MG Tabs  Commonly known as:  ZITHROMAX      Take 1 tablet (250 mg total) by mouth daily for 6 days.    Stop taking on:  March 30, 2020  Quantity:  6 tablet  Refills:  0        CONTINUE ta

## 2020-03-26 ENCOUNTER — PATIENT MESSAGE (OUTPATIENT)
Dept: INTERNAL MEDICINE CLINIC | Facility: CLINIC | Age: 45
End: 2020-03-26

## 2020-03-26 PROCEDURE — 99213 OFFICE O/P EST LOW 20 MIN: CPT | Performed by: NURSE PRACTITIONER

## 2020-03-26 NOTE — TELEPHONE ENCOUNTER
PSR attempted to schedule a HFU. Patient believes the follow up is too soon after he was discharged and he feels about the same. Patient did not consent to a VV. He does not want to pay a copay for what he believes is a \"5 minute conversation\".   He is

## 2020-03-26 NOTE — TELEPHONE ENCOUNTER
Virtual/Telephone Check-In    Jermainedav Soto verbally declines a Virtual/Telephone Check-In service on 03/26/20. He is ok with talking to me but does not want to pay for it.    Patient understands the need for it for his care and does not accept financi instructed to F/U with Millicent Hester in 4 weeks. He states he will make that apt and does not need assistance to do so. - It was reiterated not to use the CPAP machine for 2 weeks and to change his filters prior to use as instructed.    - The elevated t

## 2020-03-29 ENCOUNTER — PATIENT MESSAGE (OUTPATIENT)
Dept: INTERNAL MEDICINE CLINIC | Facility: CLINIC | Age: 45
End: 2020-03-29

## 2020-03-30 NOTE — TELEPHONE ENCOUNTER
From: Pearle Sport  To: Elena Austin MD  Sent: 3/29/2020 5:45 PM CDT  Subject: Test Results Question    I have a question about COMP METABOLIC PANEL (14) resulted on 3/22/20, 6:41 AM.    Several of these results are outside the green range?  What do

## 2020-03-31 NOTE — CDS QUERY
Clarification – Potential Diagnosis Lavonda Dubin  Dear Dr. Tatyana Chinchilla information in the patient's medical record (provided below) includes documentation of diagnoses with potential association.  For accurate I

## 2020-04-03 ENCOUNTER — TELEPHONE (OUTPATIENT)
Dept: INTERNAL MEDICINE CLINIC | Facility: CLINIC | Age: 45
End: 2020-04-03

## 2020-04-03 NOTE — TELEPHONE ENCOUNTER
The patient reports he is doing much better. He has no fevers, headaches, body aches or any other general symptoms. He still has an occasional dry cough, usually after trying to take a deep breath. He denies any sob or chest pain. He has completed the abt.

## 2020-04-03 NOTE — TELEPHONE ENCOUNTER
From: DEIRDRE Harden  To: Rodo Rider  Sent: 3/26/2020 3:27 PM CDT  Subject: Infection prevention information    Vijay Rodas,     Here are the infection prevention measure I wanted you to be aware of. If you have questions let me know. Thanks.

## 2020-04-29 PROBLEM — U07.1 PNEUMONIA DUE TO COVID-19 VIRUS: Status: ACTIVE | Noted: 2020-04-29

## 2020-04-29 PROBLEM — J12.82 PNEUMONIA DUE TO COVID-19 VIRUS: Status: ACTIVE | Noted: 2020-04-29

## 2020-05-10 ENCOUNTER — HOSPITAL ENCOUNTER (OUTPATIENT)
Age: 45
Discharge: HOME | End: 2020-05-10
Attending: EMERGENCY MEDICINE
Payer: COMMERCIAL

## 2020-05-10 ENCOUNTER — APPOINTMENT (OUTPATIENT)
Dept: GENERAL RADIOLOGY | Age: 45
End: 2020-05-10
Attending: EMERGENCY MEDICINE
Payer: COMMERCIAL

## 2020-05-10 VITALS
TEMPERATURE: 98 F | SYSTOLIC BLOOD PRESSURE: 122 MMHG | RESPIRATION RATE: 20 BRPM | DIASTOLIC BLOOD PRESSURE: 77 MMHG | HEART RATE: 78 BPM | OXYGEN SATURATION: 98 %

## 2020-05-10 DIAGNOSIS — S92.911A UNSPECIFIED FRACTURE OF RIGHT TOE(S), INITIAL ENCOUNTER FOR CLOSED FRACTURE: Primary | ICD-10-CM

## 2020-05-10 PROCEDURE — 99213 OFFICE O/P EST LOW 20 MIN: CPT

## 2020-05-10 PROCEDURE — 73660 X-RAY EXAM OF TOE(S): CPT | Performed by: EMERGENCY MEDICINE

## 2020-05-10 PROCEDURE — 28490 TREAT BIG TOE FRACTURE: CPT

## 2020-05-10 PROCEDURE — 99212 OFFICE O/P EST SF 10 MIN: CPT

## 2020-05-10 NOTE — ED INITIAL ASSESSMENT (HPI)
Right big toe- pt states he tripped on  Toy today. Pt fell bsckwards  And  Does not remember toe hitting something.

## 2020-05-10 NOTE — ED PROVIDER NOTES
Patient Seen in: [facility]      History   Patient presents with: Toe Injury    Stated Complaint: R TOE INJURY    HPI    53-year-old male presents for complaints of right toe pain.   Patient struck his right toe after accidental systems reviewed and negative except as noted above.     Physical Exam     ED Triage Vitals [05/10/20 1525]   /77   Pulse 78   Resp 20   Temp 98.1 °F (36.7 °C)   Temp src Oral   SpO2 98 %   O2 Device        Current:/77   Pulse 78   Temp 98.1 °F needed          Medications Prescribed:  Current Discharge Medication List

## 2020-05-18 ENCOUNTER — TELEMEDICINE (OUTPATIENT)
Dept: INTERNAL MEDICINE CLINIC | Facility: CLINIC | Age: 45
End: 2020-05-18
Payer: COMMERCIAL

## 2020-05-18 ENCOUNTER — TELEPHONE (OUTPATIENT)
Dept: INTERNAL MEDICINE CLINIC | Facility: CLINIC | Age: 45
End: 2020-05-18

## 2020-05-18 DIAGNOSIS — S92.424D CLOSED NONDISPLACED FRACTURE OF DISTAL PHALANX OF RIGHT GREAT TOE WITH ROUTINE HEALING, SUBSEQUENT ENCOUNTER: Primary | ICD-10-CM

## 2020-05-18 DIAGNOSIS — M79.671 RIGHT FOOT PAIN: ICD-10-CM

## 2020-05-18 PROBLEM — U07.1 PNEUMONIA DUE TO COVID-19 VIRUS: Status: RESOLVED | Noted: 2020-04-29 | Resolved: 2020-05-18

## 2020-05-18 PROBLEM — R09.02 HYPOXIA: Status: RESOLVED | Noted: 2020-03-21 | Resolved: 2020-05-18

## 2020-05-18 PROBLEM — Z80.0 ENCOUNTER FOR COLONOSCOPY IN PATIENT WITH FAMILY HISTORY OF COLON CANCER: Status: RESOLVED | Noted: 2019-07-11 | Resolved: 2020-05-18

## 2020-05-18 PROBLEM — J18.9 PRIMARY ATYPICAL PNEUMONIA: Status: RESOLVED | Noted: 2020-03-21 | Resolved: 2020-05-18

## 2020-05-18 PROBLEM — Z12.11 ENCOUNTER FOR COLONOSCOPY IN PATIENT WITH FAMILY HISTORY OF COLON CANCER: Status: RESOLVED | Noted: 2019-07-11 | Resolved: 2020-05-18

## 2020-05-18 PROBLEM — J12.9 VIRAL PNEUMONIA: Status: RESOLVED | Noted: 2020-03-21 | Resolved: 2020-05-18

## 2020-05-18 PROBLEM — J12.82 PNEUMONIA DUE TO COVID-19 VIRUS: Status: RESOLVED | Noted: 2020-04-29 | Resolved: 2020-05-18

## 2020-05-18 PROCEDURE — 99213 OFFICE O/P EST LOW 20 MIN: CPT | Performed by: STUDENT IN AN ORGANIZED HEALTH CARE EDUCATION/TRAINING PROGRAM

## 2020-05-18 NOTE — TELEPHONE ENCOUNTER
Doximity Video Visit Consent    Guilherme Rothman verbally {consents to a Doximity Video Visit Check-In service on 5-18-20  Patient understands that we are using a different platform that may not be as secure as our traditional telehealth platform.  Patient was

## 2020-05-18 NOTE — PROGRESS NOTES
Virtual Telephone Check-In    Sandip Alcazar verbally consents to a Virtual/Telephone Check-In visit on 05/18/20. Patient understands and accepts financial responsibility for any deductible, co-insurance and/or co-pays associated with this service.     Dur as needed for cough. • Naproxen Sodium (ALEVE) 220 MG Oral Cap Take 220 mg by mouth as needed. • Fluticasone Propionate (FLONASE) 50 MCG/ACT Nasal Suspension 1 spray by Nasal route daily as needed.  2 sprays daily   0   • loratadine (CLARITIN) 10 MG abrasions around the lateral and dorsal side of the first toe. There is hematoma at the base of the remaining fingers which is tender to palpation.   There is pain elicited with the first toe active flexion -patient reports the pain is at the DIP joint of these issues and agrees to the treatment plan. The patient is asked to return if symptoms persist or worsen. Please note that this visit was completed using two-way, real-time interactive audio and/or video communication.   This has been done in good

## 2020-05-20 ENCOUNTER — HOSPITAL ENCOUNTER (OUTPATIENT)
Dept: GENERAL RADIOLOGY | Age: 45
Discharge: HOME | End: 2020-05-20
Attending: STUDENT IN AN ORGANIZED HEALTH CARE EDUCATION/TRAINING PROGRAM
Payer: COMMERCIAL

## 2020-05-20 DIAGNOSIS — M79.671 RIGHT FOOT PAIN: ICD-10-CM

## 2020-05-20 PROCEDURE — 73620 X-RAY EXAM OF FOOT: CPT | Performed by: STUDENT IN AN ORGANIZED HEALTH CARE EDUCATION/TRAINING PROGRAM

## 2020-05-29 ENCOUNTER — OFFICE VISIT (OUTPATIENT)
Dept: PODIATRY CLINIC | Facility: CLINIC | Age: 45
End: 2020-05-29
Payer: COMMERCIAL

## 2020-05-29 DIAGNOSIS — S92.424A NONDISPLACED FRACTURE OF DISTAL PHALANX OF RIGHT GREAT TOE, INITIAL ENCOUNTER FOR CLOSED FRACTURE: Primary | ICD-10-CM

## 2020-05-29 PROCEDURE — 99203 OFFICE O/P NEW LOW 30 MIN: CPT | Performed by: PODIATRIST

## 2020-06-05 NOTE — PROGRESS NOTES
Guilherme Rothman is a 53year old male. Patient presents with:  New Patient: fell and broke right hallux on 5/10/20 - 1.5 week later got x-rays and was a non displace fx  - right toe joint/forefoot still hurts - pain scale 2-10/10 - takes ibuprofen as needed. Procedure Laterality Date   • ADENOIDECTOMY     • CHOLECYSTECTOMY  2015   • COLONOSCOPY     • COLONOSCOPY     • FEMUR/KNEE SURG UNLISTED      right knee   • LAPAROSCOPIC CHOLECYSTECTOMY N/A 3/4/2015    Performed by Mavis Chaudhari MD at [facility] MAIN OR   • intact sensorium   4. Musculoskeletal: Patient has pain on palpation of the base of the distal phalanx cytocide compression. I reviewed the x-rays that were taken on 5/10/2020 showing that there is a nondisplaced fracture of the distal phalanx.   It is in

## 2020-07-13 ENCOUNTER — OFFICE VISIT (OUTPATIENT)
Dept: PODIATRY CLINIC | Facility: CLINIC | Age: 45
End: 2020-07-13
Payer: COMMERCIAL

## 2020-07-13 VITALS — HEART RATE: 76 BPM | DIASTOLIC BLOOD PRESSURE: 83 MMHG | SYSTOLIC BLOOD PRESSURE: 127 MMHG

## 2020-07-13 DIAGNOSIS — M25.571 ARTHRALGIA OF TOE OF RIGHT FOOT: ICD-10-CM

## 2020-07-13 DIAGNOSIS — S92.424A NONDISPLACED FRACTURE OF DISTAL PHALANX OF RIGHT GREAT TOE, INITIAL ENCOUNTER FOR CLOSED FRACTURE: Primary | ICD-10-CM

## 2020-07-13 PROCEDURE — 99213 OFFICE O/P EST LOW 20 MIN: CPT | Performed by: PODIATRIST

## 2020-07-14 NOTE — PROGRESS NOTES
Cecil Arenas is a 53year old male. Patient presents with: Follow - Up: F/u on right greater toe pain. LOV on 5/29 pain has not gotten better. Pain occurs when he puts pressure on the toe or even walking. Not able to bend toe normally.         HPI:   Pa Grandfather         breast cancer   • Other (MI) Maternal Grandfather       Social History    Socioeconomic History      Marital status:       Spouse name: Not on file      Number of children: Not on file      Years of education: Not on file      [facility]        Plan: Before giving him a cortisone injection with fracture is trying to heal in the last phase of it we will give him physical therapy see how he does with that and I will see him back in 4 weeks 6 visits were written for.

## 2020-07-20 ENCOUNTER — OFFICE VISIT (OUTPATIENT)
Dept: INTERNAL MEDICINE CLINIC | Facility: CLINIC | Age: 45
End: 2020-07-20
Payer: COMMERCIAL

## 2020-07-20 VITALS
SYSTOLIC BLOOD PRESSURE: 122 MMHG | TEMPERATURE: 98 F | RESPIRATION RATE: 18 BRPM | BODY MASS INDEX: 28.05 KG/M2 | OXYGEN SATURATION: 99 % | DIASTOLIC BLOOD PRESSURE: 80 MMHG | HEIGHT: 67.5 IN | WEIGHT: 180.81 LBS | HEART RATE: 80 BPM

## 2020-07-20 DIAGNOSIS — Z13.0 SCREENING FOR IRON DEFICIENCY ANEMIA: ICD-10-CM

## 2020-07-20 DIAGNOSIS — Z13.228 SCREENING FOR METABOLIC DISORDER: ICD-10-CM

## 2020-07-20 DIAGNOSIS — Z12.5 SCREENING FOR MALIGNANT NEOPLASM OF PROSTATE: ICD-10-CM

## 2020-07-20 DIAGNOSIS — Z13.220 SCREENING FOR LIPOID DISORDERS: ICD-10-CM

## 2020-07-20 DIAGNOSIS — Z00.00 ENCOUNTER FOR ANNUAL PHYSICAL EXAM: Primary | ICD-10-CM

## 2020-07-20 DIAGNOSIS — Z13.1 SCREENING FOR DIABETES MELLITUS: ICD-10-CM

## 2020-07-20 DIAGNOSIS — Z13.29 SCREENING FOR THYROID DISORDER: ICD-10-CM

## 2020-07-20 PROCEDURE — 3074F SYST BP LT 130 MM HG: CPT | Performed by: STUDENT IN AN ORGANIZED HEALTH CARE EDUCATION/TRAINING PROGRAM

## 2020-07-20 PROCEDURE — 99396 PREV VISIT EST AGE 40-64: CPT | Performed by: STUDENT IN AN ORGANIZED HEALTH CARE EDUCATION/TRAINING PROGRAM

## 2020-07-20 PROCEDURE — 3008F BODY MASS INDEX DOCD: CPT | Performed by: STUDENT IN AN ORGANIZED HEALTH CARE EDUCATION/TRAINING PROGRAM

## 2020-07-20 PROCEDURE — 3079F DIAST BP 80-89 MM HG: CPT | Performed by: STUDENT IN AN ORGANIZED HEALTH CARE EDUCATION/TRAINING PROGRAM

## 2020-07-20 NOTE — PROGRESS NOTES
[facility]    REASON FOR VISIT:    Kinsey Archibald is a 53year old male who presents for an [address]. Current Complaints: feeling well. Reports decreased sexual performance, does not feel that he is not in control.  He is able to found for: RPR    Hepatitis C Screening Screen those at high risk plus screen one time for adults born 1947-1 9[birth year] No results found for: HCVAB    Tuberculosis Screen If high risk No components found for: PPDINDURAT      ALLERGIES:   No Known Allergies    CU Monthly or less      Drinks per session: 1 or 2      Binge frequency: Never      Comment: 1 beer per month    Drug use: No         REVIEW OF SYSTEMS:   Constitutional: Negative for fever, chills and fatigue.    HENT: Negative for hearing loss, congestion, s thyromegaly present. No cervical lymphadenopathy. Neck: Normal range of motion. No thyromegaly present. Cardiovascular: Normal rate, regular rhythm and normal heart sounds. Exam reveals no friction rub. No murmur heard.   Pulmonary/Chest: Effort normal prostate cancer screening. The patient indicates understanding of these issues and agrees to the plan. The patient is asked to return for physical in 1 year. Screening for iron deficiency anemia  -     CBC WITH DIFFERENTIAL WITH PLATELET;  Future    Scr

## 2020-07-27 ENCOUNTER — OFFICE VISIT (OUTPATIENT)
Dept: PHYSICAL THERAPY | Age: 45
End: 2020-07-27
Attending: PODIATRIST
Payer: COMMERCIAL

## 2020-07-27 DIAGNOSIS — M25.571 ARTHRALGIA OF TOE OF RIGHT FOOT: ICD-10-CM

## 2020-07-27 DIAGNOSIS — S92.424A NONDISPLACED FRACTURE OF DISTAL PHALANX OF RIGHT GREAT TOE, INITIAL ENCOUNTER FOR CLOSED FRACTURE: ICD-10-CM

## 2020-07-27 PROCEDURE — 97161 PT EVAL LOW COMPLEX 20 MIN: CPT

## 2020-07-27 PROCEDURE — 97110 THERAPEUTIC EXERCISES: CPT

## 2020-07-27 NOTE — PROGRESS NOTES
LOWER EXTREMITY EVALUATION:   Referring Provider: Marichuy Ackerman DPM     Referring Diagnosis:   1. Arthralgia of toe of right foot (M25.571)  2.  Nondisplaced fracture of distal phalanx of right great toe, initial encounter for closed fracture (S92.4 tube dysfunction)    • Migraine    • Neuropathy     numbness and tingling right hand, ulnar issues   • MATT (obstructive sleep apnea)     cpap 10?    • MATT (obstructive sleep apnea)    • Otalgia of left ear 11/2011   • Persistent disorder of initiating or ma free    Palpation: tenderness along medial plantar foot intrinsic muscles    Strength: Grossly 5/5 bilateral knee flexion, knee extension, ankle DF, ankle PF, ankle INV, ankle EV, great toe DF, great toe PF (painful)    Special tests: SLR: R (-), L (-) plan of treatment and while under my care.     X___________________________________________________ Date____________________    Certification From: 0/45/8647  To:10/25/2020

## 2020-07-28 ENCOUNTER — LAB ENCOUNTER (OUTPATIENT)
Dept: LAB | Age: 45
End: 2020-07-28
Attending: STUDENT IN AN ORGANIZED HEALTH CARE EDUCATION/TRAINING PROGRAM
Payer: COMMERCIAL

## 2020-07-28 ENCOUNTER — PATIENT MESSAGE (OUTPATIENT)
Dept: INTERNAL MEDICINE CLINIC | Facility: CLINIC | Age: 45
End: 2020-07-28

## 2020-07-28 DIAGNOSIS — Z12.5 SCREENING FOR MALIGNANT NEOPLASM OF PROSTATE: ICD-10-CM

## 2020-07-28 DIAGNOSIS — Z13.29 SCREENING FOR THYROID DISORDER: ICD-10-CM

## 2020-07-28 DIAGNOSIS — Z13.1 SCREENING FOR DIABETES MELLITUS: ICD-10-CM

## 2020-07-28 DIAGNOSIS — Z13.220 SCREENING FOR LIPOID DISORDERS: ICD-10-CM

## 2020-07-28 DIAGNOSIS — Z13.228 SCREENING FOR METABOLIC DISORDER: ICD-10-CM

## 2020-07-28 LAB
ALBUMIN SERPL-MCNC: 3.7 G/DL (ref 3.4–5)
ALBUMIN/GLOB SERPL: 1 {RATIO} (ref 1–2)
ALP LIVER SERPL-CCNC: 94 U/L (ref 45–117)
ALT SERPL-CCNC: 46 U/L (ref 16–61)
ANION GAP SERPL CALC-SCNC: 3 MMOL/L (ref 0–18)
AST SERPL-CCNC: 22 U/L (ref 15–37)
BILIRUB SERPL-MCNC: 0.4 MG/DL (ref 0.1–2)
BUN BLD-MCNC: 17 MG/DL (ref 7–18)
BUN/CREAT SERPL: 15.2 (ref 10–20)
CALCIUM BLD-MCNC: 8.7 MG/DL (ref 8.5–10.1)
CHLORIDE SERPL-SCNC: 106 MMOL/L (ref 98–112)
CHOLEST SMN-MCNC: 164 MG/DL (ref ?–200)
CO2 SERPL-SCNC: 27 MMOL/L (ref 21–32)
COMPLEXED PSA SERPL-MCNC: 1.46 NG/ML (ref ?–4)
CREAT BLD-MCNC: 1.12 MG/DL (ref 0.7–1.3)
EST. AVERAGE GLUCOSE BLD GHB EST-MCNC: 105 MG/DL (ref 68–126)
GLOBULIN PLAS-MCNC: 3.8 G/DL (ref 2.8–4.4)
GLUCOSE BLD-MCNC: 93 MG/DL (ref 70–99)
HBA1C MFR BLD HPLC: 5.3 % (ref ?–5.7)
HDLC SERPL-MCNC: 38 MG/DL (ref 40–59)
LDLC SERPL CALC-MCNC: 76 MG/DL (ref ?–100)
M PROTEIN MFR SERPL ELPH: 7.5 G/DL (ref 6.4–8.2)
NONHDLC SERPL-MCNC: 126 MG/DL (ref ?–130)
OSMOLALITY SERPL CALC.SUM OF ELEC: 283 MOSM/KG (ref 275–295)
PATIENT FASTING Y/N/NP: YES
PATIENT FASTING Y/N/NP: YES
POTASSIUM SERPL-SCNC: 3.5 MMOL/L (ref 3.5–5.1)
SODIUM SERPL-SCNC: 136 MMOL/L (ref 136–145)
TRIGL SERPL-MCNC: 252 MG/DL (ref 30–149)
TSI SER-ACNC: 1.96 MIU/ML (ref 0.36–3.74)
VLDLC SERPL CALC-MCNC: 50 MG/DL (ref 0–30)

## 2020-07-28 PROCEDURE — 84443 ASSAY THYROID STIM HORMONE: CPT

## 2020-07-28 PROCEDURE — 36415 COLL VENOUS BLD VENIPUNCTURE: CPT

## 2020-07-28 PROCEDURE — 83036 HEMOGLOBIN GLYCOSYLATED A1C: CPT

## 2020-07-28 PROCEDURE — 80053 COMPREHEN METABOLIC PANEL: CPT

## 2020-07-28 PROCEDURE — 80061 LIPID PANEL: CPT

## 2020-07-28 NOTE — TELEPHONE ENCOUNTER
From: Brielle Leiva  To: Marcelle Puga MD  Sent: 7/28/2020 4:17 PM CDT  Subject: Test Results Question    I have a question about LIPID PANEL resulted on 7/28/20, 11:06 AM.    Looks like there are some concerning numbers here but everything else looks good

## 2020-07-28 NOTE — PROGRESS NOTES
See Patient Message encounter 7/28/2020  Made aware of results & recommendations via [address]

## 2020-07-29 ENCOUNTER — OFFICE VISIT (OUTPATIENT)
Dept: PHYSICAL THERAPY | Age: 45
End: 2020-07-29
Attending: PODIATRIST
Payer: COMMERCIAL

## 2020-07-29 PROCEDURE — 97140 MANUAL THERAPY 1/> REGIONS: CPT

## 2020-07-29 PROCEDURE — 97110 THERAPEUTIC EXERCISES: CPT

## 2020-07-29 NOTE — PROGRESS NOTES
Dx: 1. Arthralgia of toe of right foot (M25.571)  2.  Nondisplaced fracture of distal phalanx of right great toe, initial encounter for closed fracture (J97.385A)    Authorized # of Visits: 20 (hard max)  8 POC Next MD Visit: as needed   Fall Risk: Standard

## 2020-08-03 ENCOUNTER — MED REC SCAN ONLY (OUTPATIENT)
Dept: INTERNAL MEDICINE CLINIC | Facility: CLINIC | Age: 45
End: 2020-08-03

## 2020-08-03 ENCOUNTER — OFFICE VISIT (OUTPATIENT)
Dept: PHYSICAL THERAPY | Age: 45
End: 2020-08-03
Attending: PODIATRIST
Payer: COMMERCIAL

## 2020-08-03 PROCEDURE — 97140 MANUAL THERAPY 1/> REGIONS: CPT

## 2020-08-03 PROCEDURE — 97110 THERAPEUTIC EXERCISES: CPT

## 2020-08-03 NOTE — PROGRESS NOTES
Dx: 1. Arthralgia of toe of right foot (M25.571)  2.  Nondisplaced fracture of distal phalanx of right great toe, initial encounter for closed fracture (W90.290L)    Authorized # of Visits: 20 (hard max)  8 POC Next MD Visit: as needed   Fall Risk: Standard

## 2020-08-06 ENCOUNTER — OFFICE VISIT (OUTPATIENT)
Dept: PHYSICAL THERAPY | Age: 45
End: 2020-08-06
Attending: PODIATRIST
Payer: COMMERCIAL

## 2020-08-06 PROCEDURE — 97110 THERAPEUTIC EXERCISES: CPT

## 2020-08-06 PROCEDURE — 97140 MANUAL THERAPY 1/> REGIONS: CPT

## 2020-08-06 NOTE — PROGRESS NOTES
Dx: 1. Arthralgia of toe of right foot (M25.571)  2.  Nondisplaced fracture of distal phalanx of right great toe, initial encounter for closed fracture (O99.757I)    Authorized # of Visits: 20 (hard max)  8 POC Next MD Visit: as needed   Fall Risk: Standard x2 (25 min)       Total Timed Treatment: 40 min  Total Treatment Time: 40min

## 2020-08-11 ENCOUNTER — OFFICE VISIT (OUTPATIENT)
Dept: PHYSICAL THERAPY | Age: 45
End: 2020-08-11
Attending: PODIATRIST
Payer: COMMERCIAL

## 2020-08-11 PROCEDURE — 97110 THERAPEUTIC EXERCISES: CPT

## 2020-08-11 PROCEDURE — 97140 MANUAL THERAPY 1/> REGIONS: CPT

## 2020-08-11 NOTE — PROGRESS NOTES
Dx: 1. Arthralgia of toe of right foot (M25.571)  2.  Nondisplaced fracture of distal phalanx of right great toe, initial encounter for closed fracture (I22.715J)    Authorized # of Visits: 20 (hard max)  8 POC Next MD Visit: as needed   Fall Risk: Standard joint dorsal mobs gr I x10 min  -Light foot long axis distraction x5 min Manual:  -STM: FHB x10 min  -1st MTP joint dorsal mobs gr I x10 min  -Light foot long axis distraction x5 min Manual:  -1st MTP joint dorsal mobs gr I x5 min  -Light foot long axis di

## 2020-08-13 ENCOUNTER — APPOINTMENT (OUTPATIENT)
Dept: PHYSICAL THERAPY | Age: 45
End: 2020-08-13
Attending: PODIATRIST
Payer: COMMERCIAL

## 2020-08-17 ENCOUNTER — TELEPHONE (OUTPATIENT)
Dept: PHYSICAL THERAPY | Age: 45
End: 2020-08-17

## 2020-08-18 ENCOUNTER — APPOINTMENT (OUTPATIENT)
Dept: PHYSICAL THERAPY | Age: 45
End: 2020-08-18
Attending: PODIATRIST
Payer: COMMERCIAL

## 2020-08-20 ENCOUNTER — OFFICE VISIT (OUTPATIENT)
Dept: PHYSICAL THERAPY | Age: 45
End: 2020-08-20
Attending: PODIATRIST
Payer: COMMERCIAL

## 2020-08-20 PROCEDURE — 97110 THERAPEUTIC EXERCISES: CPT

## 2020-08-20 PROCEDURE — 97140 MANUAL THERAPY 1/> REGIONS: CPT

## 2020-08-20 NOTE — PROGRESS NOTES
Dx: 1. Arthralgia of toe of right foot (M25.571)  2.  Nondisplaced fracture of distal phalanx of right great toe, initial encounter for closed fracture (O45.355F)    Authorized # of Visits: 20 (hard max)  8 POC Next MD Visit: as needed   Fall Risk: Standard direction  -Resisted ankle INV (long sitting), green band, 3x15 reps  -SLS towel roll, 3x30 sec  -BAPS board (seated), L2, ankle: DF/PF taps 2x10 reps; INV/EV taps 2x10 reps  -SLS with tri-pod weight bearing x2 min     Manual:  -STM: FHB x10 min  -1st MTP

## 2020-08-27 ENCOUNTER — OFFICE VISIT (OUTPATIENT)
Dept: PHYSICAL THERAPY | Age: 45
End: 2020-08-27
Attending: PODIATRIST
Payer: COMMERCIAL

## 2020-08-27 PROCEDURE — 97140 MANUAL THERAPY 1/> REGIONS: CPT

## 2020-08-27 PROCEDURE — 97110 THERAPEUTIC EXERCISES: CPT

## 2020-08-27 NOTE — PROGRESS NOTES
Dx: 1. Arthralgia of toe of right foot (M25.571)  2.  Nondisplaced fracture of distal phalanx of right great toe, initial encounter for closed fracture (E45.868J)    Authorized # of Visits: 20 (hard max)  8 POC Next MD Visit: as needed   Fall Risk: Standard 2x10 reps; INV/EV taps 2x10 reps TherEx:  -Manually resisted ankle DF, PF, INV, EV: x10 reps each direction  -Resisted ankle INV (long sitting), green band, 3x15 reps  -SLS towel roll, 3x30 sec  -BAPS board (seated), L2, ankle: DF/PF taps 2x10 reps; INV/EV

## 2020-09-03 ENCOUNTER — OFFICE VISIT (OUTPATIENT)
Dept: PHYSICAL THERAPY | Age: 45
End: 2020-09-03
Attending: PODIATRIST
Payer: COMMERCIAL

## 2020-09-03 PROCEDURE — 97110 THERAPEUTIC EXERCISES: CPT

## 2020-09-03 NOTE — PROGRESS NOTES
Dx: 1. Arthralgia of toe of right foot (M25.571)  2.  Nondisplaced fracture of distal phalanx of right great toe, initial encounter for closed fracture (P32.978E)    Authorized # of Visits: 20 (hard max)  8 POC Next MD Visit: as needed   Fall Risk: Standard letter via fax as soon as possible to [phone]. I certify the need for these services furnished under this plan of treatment and while under my care.     X___________________________________________________ Date____________________    Certification Fr raise with ankle inversion x5 reps (pain starting rep 2)  -Manually resisted great toe extension 3x15 reps  -Self resisted great toe extension 3x15 reps   Manual:  -STM: FHB x10 min  -1st MTP joint dorsal mobs gr I x10 min  -Light foot long axis distractio

## 2020-09-10 ENCOUNTER — OFFICE VISIT (OUTPATIENT)
Dept: PHYSICAL THERAPY | Age: 45
End: 2020-09-10
Attending: PODIATRIST
Payer: COMMERCIAL

## 2020-09-10 PROCEDURE — 97112 NEUROMUSCULAR REEDUCATION: CPT

## 2020-09-10 PROCEDURE — 97110 THERAPEUTIC EXERCISES: CPT

## 2020-09-10 NOTE — PROGRESS NOTES
Dx: 1. Arthralgia of toe of right foot (M25.571)  2.  Nondisplaced fracture of distal phalanx of right great toe, initial encounter for closed fracture (O24.403L)    Authorized # of Visits: 20 (hard max)  8 POC Next MD Visit: as needed   Fall Risk: Standard TherEx:  -Manually resisted ankle DF, PF, INV, EV: x10 reps each direction  -Resisted ankle INV (long sitting), green band, 3x15 reps  -SLS towel roll, 3x30 sec  -BAPS board (seated), L2, ankle: DF/PF taps 2x10 reps; INV/EV taps 2x10 reps TherEx:  -Manuall reps  -BOSU step up with contra hip flexion, R/L 2x10 reps       HEP: squats, lunges (FWD and LAT), SLS on towel    Charges:  TherEx x2 (30 min), NMR x1 (10 min)      Total Timed Treatment: 40 min  Total Treatment Time: 40min

## 2020-09-16 ENCOUNTER — TELEPHONE (OUTPATIENT)
Dept: PHYSICAL THERAPY | Age: 45
End: 2020-09-16

## 2020-09-17 ENCOUNTER — APPOINTMENT (OUTPATIENT)
Dept: PHYSICAL THERAPY | Age: 45
End: 2020-09-17
Attending: PODIATRIST
Payer: COMMERCIAL

## 2020-09-28 ENCOUNTER — OFFICE VISIT (OUTPATIENT)
Dept: PODIATRY CLINIC | Facility: CLINIC | Age: 45
End: 2020-09-28
Payer: COMMERCIAL

## 2020-09-28 DIAGNOSIS — S92.424A NONDISPLACED FRACTURE OF DISTAL PHALANX OF RIGHT GREAT TOE, INITIAL ENCOUNTER FOR CLOSED FRACTURE: ICD-10-CM

## 2020-09-28 DIAGNOSIS — M20.5X1 HALLUX LIMITUS, RIGHT: ICD-10-CM

## 2020-09-28 DIAGNOSIS — M25.571 ARTHRALGIA OF TOE OF RIGHT FOOT: ICD-10-CM

## 2020-09-28 PROCEDURE — 99213 OFFICE O/P EST LOW 20 MIN: CPT | Performed by: PODIATRIST

## 2020-09-28 NOTE — PROGRESS NOTES
Nehemias Kc is a 50year old male. Patient presents with: Toe Pain: right hallux -- Pt going to PT. ROM is limited. Rates pain 2/10 most days. Pain increases to 8/10 with PROM.          HPI:   This pleasant patient returns to the clinic on a daily basis a Paternal Grandmother    • Other (lymphoma) Paternal Grandfather    • Cancer Maternal Grandfather         breast cancer   • Other (MI) Maternal Grandfather       Social History    Socioeconomic History      Marital status:       Spouse name: Not on f phalanx of right great toe, initial encounter for closed fracture    Arthralgia of toe of right foot    Hallux limitus, right        Plan: At today's visit recommended that we can get him into an orthotic this would probably have a Hale's extension.   We

## 2020-10-01 ENCOUNTER — OFFICE VISIT (OUTPATIENT)
Dept: PHYSICAL THERAPY | Age: 45
End: 2020-10-01
Attending: PODIATRIST
Payer: COMMERCIAL

## 2020-10-01 PROCEDURE — 97112 NEUROMUSCULAR REEDUCATION: CPT

## 2020-10-01 PROCEDURE — 97110 THERAPEUTIC EXERCISES: CPT

## 2020-10-01 NOTE — PROGRESS NOTES
Dx: 1. Arthralgia of toe of right foot (M25.571)  2.  Nondisplaced fracture of distal phalanx of right great toe, initial encounter for closed fracture (S05.526D)    Authorized # of Visits: 20 (hard max)  8 POC Next MD Visit: as needed   Fall Risk: Standard toe DF stretch 6x30 sec hold  -SLS towel roll, 3x30 sec TherEx:  -Manually resisted ankle DF, PF, INV, EV: x10 reps each direction  -Resisted ankle INV (long sitting), green band, 3x15 reps  -SLS towel roll, 3x30 sec  -BAPS board (seated), L2, ankle: DF/PF distraction x5 min  - NMR:  -SLS airex, R/L, 3x30 sec hold  -Cable pull down, unilateral, tandem stance airex, 10#, R/L, 2x10 reps  -BOSU step up with contra hip flexion, R/L 2x10 reps   NMR:  -SLS airex, R/L, 3x30 sec hold  -Cable pull down, unilateral, t

## 2020-11-10 ENCOUNTER — TELEPHONE (OUTPATIENT)
Dept: INTERNAL MEDICINE CLINIC | Facility: CLINIC | Age: 45
End: 2020-11-10

## 2020-11-10 NOTE — TELEPHONE ENCOUNTER
Pt calling and said that he has been experiencing back pain recently and he has a PPO so he was wanting a recommendation for somebody to see for this issue     Please Advise    Thank you

## 2020-11-12 NOTE — TELEPHONE ENCOUNTER
See note below. Please advise on recommendation for back pain. Pt states has PPO. Or recommend OV first?  Please advise, thanks!

## 2020-11-12 NOTE — TELEPHONE ENCOUNTER
The patient should be seen in the office first before referral is made. Please have him make an OV. Thank you.

## 2020-12-01 ENCOUNTER — OFFICE VISIT (OUTPATIENT)
Dept: INTERNAL MEDICINE CLINIC | Facility: CLINIC | Age: 45
End: 2020-12-01
Payer: COMMERCIAL

## 2020-12-01 VITALS
DIASTOLIC BLOOD PRESSURE: 72 MMHG | OXYGEN SATURATION: 97 % | TEMPERATURE: 96 F | RESPIRATION RATE: 14 BRPM | HEIGHT: 67.5 IN | BODY MASS INDEX: 27.67 KG/M2 | HEART RATE: 87 BPM | SYSTOLIC BLOOD PRESSURE: 110 MMHG | WEIGHT: 178.38 LBS

## 2020-12-01 DIAGNOSIS — M54.40 ACUTE LEFT-SIDED LOW BACK PAIN WITH SCIATICA, SCIATICA LATERALITY UNSPECIFIED: Primary | ICD-10-CM

## 2020-12-01 PROCEDURE — 99214 OFFICE O/P EST MOD 30 MIN: CPT | Performed by: NURSE PRACTITIONER

## 2020-12-01 PROCEDURE — 3078F DIAST BP <80 MM HG: CPT | Performed by: NURSE PRACTITIONER

## 2020-12-01 PROCEDURE — 3008F BODY MASS INDEX DOCD: CPT | Performed by: NURSE PRACTITIONER

## 2020-12-01 PROCEDURE — 3074F SYST BP LT 130 MM HG: CPT | Performed by: NURSE PRACTITIONER

## 2020-12-01 RX ORDER — CYCLOBENZAPRINE HCL 5 MG
5 TABLET ORAL 3 TIMES DAILY PRN
Qty: 21 TABLET | Refills: 0 | Status: SHIPPED | OUTPATIENT
Start: 2020-12-01 | End: 2020-12-08

## 2020-12-01 RX ORDER — PREDNISONE 20 MG/1
40 TABLET ORAL DAILY
Qty: 14 TABLET | Refills: 0 | Status: SHIPPED | OUTPATIENT
Start: 2020-12-01 | End: 2020-12-16

## 2020-12-01 NOTE — PATIENT INSTRUCTIONS
Go to physical therapy for your back pain    Start the prednisone in the morning with food. Monitor for side effects. Do not take NSAIDs while taking this medication.   You may still take Tylenol as needed and use topical pain reliever such as lidocaine o

## 2020-12-01 NOTE — PROGRESS NOTES
Juan R Gorman is a 50year old male. CHIEF COMPLAINT   Back pain    HPI:   Pain since halloween to left side of back since halloween. It was bilateral at first but the right side has resolved. The pain is 3-4/10 with rest and 5/10 with movement.  Tylenol an BMI 27.53 kg/m²   Body mass index is 27.53 kg/m².    GENERAL: well developed, well nourished,in no apparent distress  HEENT: atraumatic, normocephalic  LUNGS: clear to auscultation; no rhonchi, rales, or wheezing  CARDIO: RRR without murmur  GI: good BS's, patient has had left side back pain for over a month. He has sciatica bilaterally at times without weakness, numbness tingling or loss of B&B.   - Prednisone and flexeril prescribed. See pt edu for instructions  - PT ordered.   - If conservative management

## 2020-12-03 ENCOUNTER — TELEPHONE (OUTPATIENT)
Dept: PHYSICAL THERAPY | Age: 45
End: 2020-12-03

## 2020-12-04 ENCOUNTER — APPOINTMENT (OUTPATIENT)
Dept: PHYSICAL THERAPY | Age: 45
End: 2020-12-04
Attending: NURSE PRACTITIONER
Payer: COMMERCIAL

## 2020-12-11 ENCOUNTER — OFFICE VISIT (OUTPATIENT)
Dept: PHYSICAL THERAPY | Age: 45
End: 2020-12-11
Attending: NURSE PRACTITIONER
Payer: COMMERCIAL

## 2020-12-11 PROCEDURE — 97161 PT EVAL LOW COMPLEX 20 MIN: CPT

## 2020-12-11 PROCEDURE — 97110 THERAPEUTIC EXERCISES: CPT

## 2020-12-11 NOTE — PROGRESS NOTES
SPINE EVALUATION:   Referring Provider: DEIRDRE Jolly     Referring Diagnosis: Acute left-sided low back pain with sciatica, sciatica laterality unspecified (M54.40)   Date of Service: 12/11/2020     PATIENT SUMMARY   Cecil Arenas is a 50 year abnormality    • Unspecified sleep apnea    • URI (upper respiratory infection) 11/2011   • Visual impairment     wears glasses for distance      Past Surgical History:   Procedure Laterality Date   • Adenoidectomy     • Cholecystectomy  2015   • [place] zoë: R 5/5; L 5-/5    Flexibility:   Hamstrings (SLR, degrees): R 45; L 40    Gait: pt ambulates on level ground with normal mechanics.   Squat: slightly increased femoral IR bilaterally, no symptom reproduction    Today’s Treatment and Response:   Pt e care.    X___________________________________________________ Date____________________    Certification From: 03/27/2458  To:3/11/2021

## 2020-12-15 ENCOUNTER — OFFICE VISIT (OUTPATIENT)
Dept: PHYSICAL THERAPY | Age: 45
End: 2020-12-15
Attending: NURSE PRACTITIONER
Payer: COMMERCIAL

## 2020-12-15 PROCEDURE — 97110 THERAPEUTIC EXERCISES: CPT

## 2020-12-15 PROCEDURE — 97140 MANUAL THERAPY 1/> REGIONS: CPT

## 2020-12-15 NOTE — PROGRESS NOTES
Dx: L LBP   Authorized # of Visits: 8 POC  20/yr Next MD Visit: as needed   Fall Risk: Standard  Precautions: None      Subjective: Has noticed R 2nd toe IP pain, like stepping on ground glass, started on Saturday, getting back.  Lateral R foot pain for res

## 2020-12-16 ENCOUNTER — OFFICE VISIT (OUTPATIENT)
Dept: INTERNAL MEDICINE CLINIC | Facility: CLINIC | Age: 45
End: 2020-12-16
Payer: COMMERCIAL

## 2020-12-16 VITALS
WEIGHT: 175.81 LBS | DIASTOLIC BLOOD PRESSURE: 78 MMHG | OXYGEN SATURATION: 98 % | TEMPERATURE: 97 F | HEART RATE: 76 BPM | HEIGHT: 67.5 IN | SYSTOLIC BLOOD PRESSURE: 112 MMHG | RESPIRATION RATE: 14 BRPM | BODY MASS INDEX: 27.27 KG/M2

## 2020-12-16 DIAGNOSIS — M79.671 RIGHT FOOT PAIN: Primary | ICD-10-CM

## 2020-12-16 PROCEDURE — 99214 OFFICE O/P EST MOD 30 MIN: CPT | Performed by: NURSE PRACTITIONER

## 2020-12-16 PROCEDURE — 3078F DIAST BP <80 MM HG: CPT | Performed by: NURSE PRACTITIONER

## 2020-12-16 PROCEDURE — 3008F BODY MASS INDEX DOCD: CPT | Performed by: NURSE PRACTITIONER

## 2020-12-16 PROCEDURE — 3074F SYST BP LT 130 MM HG: CPT | Performed by: NURSE PRACTITIONER

## 2020-12-16 RX ORDER — PREDNISONE 20 MG/1
40 TABLET ORAL DAILY
Qty: 14 TABLET | Refills: 0 | Status: SHIPPED | OUTPATIENT
Start: 2020-12-16 | End: 2021-02-22

## 2020-12-16 RX ORDER — CEPHALEXIN 500 MG/1
500 CAPSULE ORAL 3 TIMES DAILY
Qty: 21 CAPSULE | Refills: 0 | Status: SHIPPED | OUTPATIENT
Start: 2020-12-16 | End: 2021-02-22

## 2020-12-16 NOTE — PROGRESS NOTES
Lexie Toussaint is a 50year old male. CHIEF COMPLAINT   Right foot pain    HPI:   The patient is here with right foot pain at the second toe, worse with walking. It started on Saturday when he woke up. The pain is 6/10 now. Tylenol helps some.    He de developed, well nourished,in no apparent distress  SKIN: Right foot second toe with mild erythema and trace edema. No open skin/wounds. No warmth.    HEENT: atraumatic, normocephalic  LUNGS: clear to auscultation; no rhonchi, rales, or wheezing  CARDIO: RRR starting. Differential: gout, sprain, fracture, cellulitis. - Oral prednisone started.  Instructed to montior for SE.  - If symptoms worsen with the steroid and seems more like infection abt also provided and is to be started if erythema, swelling, and immanuel

## 2020-12-16 NOTE — PATIENT INSTRUCTIONS
Take the prednisone in the morning with food. Monitor for side effects. Do not take NSAIDs like ibuprofen while taking this medication.     Start the antibiotic only if needed if you start to have worsening redness, pain, swelling, warmth, drainage to the a level higher than your heart. · Apply an ice pack (ice cubes in a plastic bag wrapped in a thin towel) over the injured area for 20 minutes every 1 to 2 hours the first day for pain relief. Continue this 3 to 4 times a day for swelling and pain.   · Avoi color)  Jose Alberto last reviewed this educational content on 6/1/2019

## 2020-12-18 ENCOUNTER — OFFICE VISIT (OUTPATIENT)
Dept: PHYSICAL THERAPY | Age: 45
End: 2020-12-18
Attending: NURSE PRACTITIONER
Payer: COMMERCIAL

## 2020-12-18 PROCEDURE — 97140 MANUAL THERAPY 1/> REGIONS: CPT

## 2020-12-18 PROCEDURE — 97110 THERAPEUTIC EXERCISES: CPT

## 2020-12-18 NOTE — PROGRESS NOTES
Dx: L LBP   Authorized # of Visits: 8 POC  20/yr Next MD Visit: as needed   Fall Risk: Standard  Precautions: None      Subjective: Pt states he is still having 2nd toe pain, started prednisone after PCP consult.  Back is doing well, exercises/stretches goi

## 2020-12-22 ENCOUNTER — OFFICE VISIT (OUTPATIENT)
Dept: PHYSICAL THERAPY | Age: 45
End: 2020-12-22
Attending: NURSE PRACTITIONER
Payer: COMMERCIAL

## 2020-12-22 PROCEDURE — 97110 THERAPEUTIC EXERCISES: CPT

## 2020-12-22 PROCEDURE — 97140 MANUAL THERAPY 1/> REGIONS: CPT

## 2020-12-22 NOTE — PROGRESS NOTES
Dx: L LBP   Authorized # of Visits: 8 POC  20/yr Next MD Visit: as needed   Fall Risk: Standard  Precautions: None      Subjective: \"Feels sore after the exercises, like an ache/sore usage. \" States it lasts less than an hour.  Denies buttock or leg sympto

## 2020-12-24 ENCOUNTER — OFFICE VISIT (OUTPATIENT)
Dept: PHYSICAL THERAPY | Age: 45
End: 2020-12-24
Attending: NURSE PRACTITIONER
Payer: COMMERCIAL

## 2020-12-24 PROCEDURE — 97110 THERAPEUTIC EXERCISES: CPT

## 2020-12-24 PROCEDURE — 97140 MANUAL THERAPY 1/> REGIONS: CPT

## 2020-12-24 NOTE — PROGRESS NOTES
Dx: L LBP   Authorized # of Visits: 8 POC  20/yr Next MD Visit: as needed   Fall Risk: Standard  Precautions: None      Subjective: HEP is going well, felt good with the resistance.  Had some R sided soreness towards end of 2nd set yesterday, still a little Manual:  -STM R lumbar paraspinal/QL x10 min  -L3-5 C-PA gr II x10 min Manual:  -STM R lumbar paraspinal/QL x10 min  -L3-5 C-PA gr II x10 min Manual:  -STM R lumbar paraspinal/QL x10 min Manual:  -STM R lumbar paraspinal/QL x10 min        HEP: unilateral

## 2020-12-29 ENCOUNTER — OFFICE VISIT (OUTPATIENT)
Dept: PHYSICAL THERAPY | Age: 45
End: 2020-12-29
Attending: NURSE PRACTITIONER
Payer: COMMERCIAL

## 2020-12-29 PROCEDURE — 97110 THERAPEUTIC EXERCISES: CPT

## 2020-12-29 NOTE — PROGRESS NOTES
Dx: L LBP   Authorized # of Visits: 8 POC  20/yr Next MD Visit: as needed   Fall Risk: Standard  Precautions: None      Subjective: Sore for a couple of days after last session, no radiating pain. Fatigue with exercise currently, but not symptomatic.      O x10 reps TherEx:   -Quadruped posterior pelvic tilt into Child's pose, 10x5 sec hold  -Child's pose with arms L 2x30 sec, R 2x30 sec  -Side plank at wall, R/L 2x10 reps (shoulder >abdominal fatigue)  -Resisted trunk rotation, standing, red band, R/L, 2x15

## 2020-12-31 ENCOUNTER — APPOINTMENT (OUTPATIENT)
Dept: PHYSICAL THERAPY | Age: 45
End: 2020-12-31
Attending: NURSE PRACTITIONER
Payer: COMMERCIAL

## 2020-12-31 ENCOUNTER — OFFICE VISIT (OUTPATIENT)
Dept: PHYSICAL THERAPY | Age: 45
End: 2020-12-31
Attending: NURSE PRACTITIONER
Payer: COMMERCIAL

## 2020-12-31 PROCEDURE — 97110 THERAPEUTIC EXERCISES: CPT

## 2020-12-31 PROCEDURE — 97140 MANUAL THERAPY 1/> REGIONS: CPT

## 2020-12-31 NOTE — PROGRESS NOTES
Dx: L LBP   Authorized # of Visits: 8 POC  20/yr Next MD Visit: as needed   Fall Risk: Standard  Precautions: None      Subjective: Woke up yesterday with LBP and buttock pain. Had radiating pain along lateral right thigh for a few hours in the morning.  St posterior pelvic tilt into Child's pose, 10x5 sec hold  -Child's pose with arms L 2x30 sec, R 2x30 sec  -Side plank at wall, R/L 2x10 reps (shoulder >abdominal fatigue)  -Resisted trunk rotation, standing, red band, R/L, 2x15 reps, x10 reps TherEx:   -[employer]

## 2021-01-03 ENCOUNTER — PATIENT MESSAGE (OUTPATIENT)
Dept: INTERNAL MEDICINE CLINIC | Facility: CLINIC | Age: 46
End: 2021-01-03

## 2021-01-04 NOTE — TELEPHONE ENCOUNTER
From: Donya Austin  To: Monika Galan MD  Sent: 1/3/2021 12:58 PM CST  Subject: Non-Urgent Medical Question    Dr. Elvira Mackenzie,    One other question. A friend of mine who had COVID over the summer recently almost  from a sudden heart attack.  While

## 2021-01-08 ENCOUNTER — OFFICE VISIT (OUTPATIENT)
Dept: PHYSICAL THERAPY | Age: 46
End: 2021-01-08
Attending: NURSE PRACTITIONER
Payer: COMMERCIAL

## 2021-01-08 ENCOUNTER — ORDER TRANSCRIPTION (OUTPATIENT)
Dept: PHYSICAL THERAPY | Facility: HOSPITAL | Age: 46
End: 2021-01-08

## 2021-01-08 DIAGNOSIS — M65.322 TRIGGER INDEX FINGER OF LEFT HAND: ICD-10-CM

## 2021-01-08 DIAGNOSIS — M25.539 PAIN IN JOINT, FOREARM: ICD-10-CM

## 2021-01-08 DIAGNOSIS — M65.321 TRIGGER INDEX FINGER OF RIGHT HAND: Primary | ICD-10-CM

## 2021-01-08 PROCEDURE — 97110 THERAPEUTIC EXERCISES: CPT

## 2021-01-08 PROCEDURE — 97140 MANUAL THERAPY 1/> REGIONS: CPT

## 2021-01-08 NOTE — PROGRESS NOTES
Dx: L LBP   Authorized # of Visits: 8 POC  20/yr Next MD Visit: as needed   Fall Risk: Standard  Precautions: None       Progress Summary  Pt has attended 8 visits in Physical Therapy.      Subjective: Was feeling better with lumbar AROM and stretching, fel me at Dept: [phone]. Sincerely,  Electronically signed by therapist: Kevin Nichols PT, DPT     Physician's certification required:  Yes  Please co-sign or sign and return this letter via fax as soon as possible to [phone].    I certify the need reassessment x10 min  -Quadruped posterior pelvic tilt into Child's pose, 10x5 sec hold  -Seated lumbar flexion x10 reps, 3x30 sec hold TherEx:  -Side plank at wall, R/L, 4x20 sec hold  -Quadruped posterior pelvic tilt into Child's pose, 10x5 sec hold  -Se

## 2021-01-13 ENCOUNTER — APPOINTMENT (OUTPATIENT)
Dept: PHYSICAL THERAPY | Age: 46
End: 2021-01-13
Attending: NURSE PRACTITIONER
Payer: COMMERCIAL

## 2021-01-15 ENCOUNTER — OFFICE VISIT (OUTPATIENT)
Dept: PHYSICAL THERAPY | Age: 46
End: 2021-01-15
Attending: NURSE PRACTITIONER
Payer: COMMERCIAL

## 2021-01-15 PROCEDURE — 97140 MANUAL THERAPY 1/> REGIONS: CPT

## 2021-01-15 PROCEDURE — 97110 THERAPEUTIC EXERCISES: CPT

## 2021-01-15 NOTE — PROGRESS NOTES
Dx: L LBP   Authorized # of Visits: 8 POC  20/yr Next MD Visit: as needed   Fall Risk: Standard  Precautions: None      Subjective: Feeling good today. Had to sleep on couch the last few nights, no symptom flare up.      Objective:   Trunk AROM  Flexion: fi lateral flexion, black band, R/L, 2x10 reps TherEx:  -Lumbar reassessment x10 min  -Bridge x10 reps; green loop at knees x10 reps, x15 reps   -Prone hip extension, R/L, x10 reps  -Hook-lying marching with posterior pelvic tilt, green band at thigh, x15 rep

## 2021-01-16 ENCOUNTER — APPOINTMENT (OUTPATIENT)
Dept: PHYSICAL THERAPY | Age: 46
End: 2021-01-16
Attending: NURSE PRACTITIONER
Payer: COMMERCIAL

## 2021-01-18 ENCOUNTER — APPOINTMENT (OUTPATIENT)
Dept: PHYSICAL THERAPY | Age: 46
End: 2021-01-18
Attending: NURSE PRACTITIONER
Payer: COMMERCIAL

## 2021-01-18 ENCOUNTER — ORDER TRANSCRIPTION (OUTPATIENT)
Dept: PHYSICAL THERAPY | Facility: HOSPITAL | Age: 46
End: 2021-01-18

## 2021-01-18 ENCOUNTER — OFFICE VISIT (OUTPATIENT)
Dept: PHYSICAL THERAPY | Age: 46
End: 2021-01-18
Attending: NURSE PRACTITIONER
Payer: COMMERCIAL

## 2021-01-18 DIAGNOSIS — M25.539 WRIST PAIN: Primary | ICD-10-CM

## 2021-01-18 PROCEDURE — 97110 THERAPEUTIC EXERCISES: CPT

## 2021-01-18 PROCEDURE — 97140 MANUAL THERAPY 1/> REGIONS: CPT

## 2021-01-21 ENCOUNTER — APPOINTMENT (OUTPATIENT)
Dept: PHYSICAL THERAPY | Age: 46
End: 2021-01-21
Attending: NURSE PRACTITIONER
Payer: COMMERCIAL

## 2021-01-22 ENCOUNTER — OFFICE VISIT (OUTPATIENT)
Dept: PHYSICAL THERAPY | Age: 46
End: 2021-01-22
Attending: NURSE PRACTITIONER
Payer: COMMERCIAL

## 2021-01-22 DIAGNOSIS — M25.532 PAIN IN BOTH WRISTS: ICD-10-CM

## 2021-01-22 DIAGNOSIS — M25.531 PAIN IN BOTH WRISTS: ICD-10-CM

## 2021-01-22 PROCEDURE — 97110 THERAPEUTIC EXERCISES: CPT

## 2021-01-22 PROCEDURE — 97162 PT EVAL MOD COMPLEX 30 MIN: CPT

## 2021-01-22 NOTE — PROGRESS NOTES
Darius Martinez     UPPER EXTREMITY EVALUATION:   Referring Provider:  Darci Hernandes MD   Referring Diagnosis: Wrist pain (M25.539)     Date of Service: 1/22/2021     PATIENT SUMMARY   Donya Austin is a 50year old male who presents to clinic today with complaints of numbness and tingling right hand, ulnar issues   • MATT (obstructive sleep apnea)     cpap 10?    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     X___________________________________________________ Date____________________    Certification From: 1/

## 2021-01-25 ENCOUNTER — APPOINTMENT (OUTPATIENT)
Dept: PHYSICAL THERAPY | Age: 46
End: 2021-01-25
Attending: NURSE PRACTITIONER
Payer: COMMERCIAL

## 2021-01-27 ENCOUNTER — OFFICE VISIT (OUTPATIENT)
Dept: PHYSICAL THERAPY | Age: 46
End: 2021-01-27
Attending: NURSE PRACTITIONER
Payer: COMMERCIAL

## 2021-01-27 PROCEDURE — 97140 MANUAL THERAPY 1/> REGIONS: CPT

## 2021-01-27 PROCEDURE — 97110 THERAPEUTIC EXERCISES: CPT

## 2021-01-27 NOTE — PROGRESS NOTES
Dx: Wrist pain   Authorized # of Visits: 8 POC  20/yr (3 used) Next MD Visit: as needed   Fall Risk: Standard  Precautions: None      Subjective: Symptoms still consistent, but the nerve glide exercise seems to help.     Objective:   Upper Limb Tension Test

## 2021-01-29 ENCOUNTER — APPOINTMENT (OUTPATIENT)
Dept: PHYSICAL THERAPY | Age: 46
End: 2021-01-29
Attending: NURSE PRACTITIONER
Payer: COMMERCIAL

## 2021-01-30 ENCOUNTER — APPOINTMENT (OUTPATIENT)
Dept: PHYSICAL THERAPY | Age: 46
End: 2021-01-30
Attending: NURSE PRACTITIONER
Payer: COMMERCIAL

## 2021-02-01 ENCOUNTER — OFFICE VISIT (OUTPATIENT)
Dept: PHYSICAL THERAPY | Age: 46
End: 2021-02-01
Attending: NURSE PRACTITIONER
Payer: COMMERCIAL

## 2021-02-01 ENCOUNTER — APPOINTMENT (OUTPATIENT)
Dept: PHYSICAL THERAPY | Age: 46
End: 2021-02-01
Attending: NURSE PRACTITIONER
Payer: COMMERCIAL

## 2021-02-01 PROCEDURE — 97110 THERAPEUTIC EXERCISES: CPT

## 2021-02-01 PROCEDURE — 97140 MANUAL THERAPY 1/> REGIONS: CPT

## 2021-02-01 NOTE — PROGRESS NOTES
Dx: Wrist pain   Authorized # of Visits: 8 POC  20/yr (3 used) Next MD Visit: as needed   Fall Risk: Standard  Precautions: None      Subjective: The forearm seems to be more irritable since last session. \"Last night was not great. \" He states he had incr Treatment Time: 40 min

## 2021-02-04 ENCOUNTER — OFFICE VISIT (OUTPATIENT)
Dept: PHYSICAL THERAPY | Age: 46
End: 2021-02-04
Attending: NURSE PRACTITIONER
Payer: COMMERCIAL

## 2021-02-04 PROCEDURE — 97140 MANUAL THERAPY 1/> REGIONS: CPT

## 2021-02-04 PROCEDURE — 97110 THERAPEUTIC EXERCISES: CPT

## 2021-02-08 ENCOUNTER — OFFICE VISIT (OUTPATIENT)
Dept: PHYSICAL THERAPY | Age: 46
End: 2021-02-08
Attending: NURSE PRACTITIONER
Payer: COMMERCIAL

## 2021-02-08 PROCEDURE — 97140 MANUAL THERAPY 1/> REGIONS: CPT

## 2021-02-08 PROCEDURE — 97110 THERAPEUTIC EXERCISES: CPT

## 2021-02-08 NOTE — PROGRESS NOTES
Dx: Wrist pain   Authorized # of Visits: 8 POC  20/yr (3 used) Next MD Visit: as needed   Fall Risk: Standard  Precautions: None      Subjective: Had to use a screw driving and had ulnar border of wrist pain.  R shoulder pain with new exercise when moving a x2 (25 min)       Total Timed Treatment: 40 min  Total Treatment Time: 40 min

## 2021-02-11 ENCOUNTER — OFFICE VISIT (OUTPATIENT)
Dept: PHYSICAL THERAPY | Age: 46
End: 2021-02-11
Attending: NURSE PRACTITIONER
Payer: COMMERCIAL

## 2021-02-11 PROCEDURE — 97110 THERAPEUTIC EXERCISES: CPT

## 2021-02-11 PROCEDURE — 97140 MANUAL THERAPY 1/> REGIONS: CPT

## 2021-02-11 NOTE — PROGRESS NOTES
Dx: Wrist pain   Authorized # of Visits: 8 POC  20/yr (3 used) Next MD Visit: as needed   Fall Risk: Standard  Precautions: None      Subjective: Having increased right hand numbness in 3-5th digits.  Also having sharp pain radial border of wrist, woke up w Manual:  -Trigger point, right/left pronator teres x10 min  -STM, hawk  to right pronator teres x15 min Manual:  -Thoracic C-PA gr III T3-6 x25 min Manual:  -Hand intrinsic manual stretch x10 min  -Light wrist distraction 6x30 sec hold  -Cervical dist

## 2021-02-15 ENCOUNTER — OFFICE VISIT (OUTPATIENT)
Dept: PHYSICAL THERAPY | Age: 46
End: 2021-02-15
Attending: NURSE PRACTITIONER
Payer: COMMERCIAL

## 2021-02-15 PROCEDURE — 97140 MANUAL THERAPY 1/> REGIONS: CPT

## 2021-02-15 PROCEDURE — 97110 THERAPEUTIC EXERCISES: CPT

## 2021-02-15 NOTE — PROGRESS NOTES
Dx: Wrist pain   Authorized # of Visits: 8 POC  20/yr (3 used) Next MD Visit: as needed   Fall Risk: Standard  Precautions: None      Subjective:  Had decreased thumb pain on nights he didn't use the night splint, but did notice a slight increase in his hoyt pronator teres x5 min  -STM, hawk  to right pronator teres x15 min Manual:  -Trigger point, right pronator teres x5 min  -STM, hawk  to right pronator teres x15 min  -Left LE long axis distraction x5 min  Manual:  -Trigger point, right/left prona

## 2021-02-18 ENCOUNTER — APPOINTMENT (OUTPATIENT)
Dept: PHYSICAL THERAPY | Age: 46
End: 2021-02-18
Attending: NURSE PRACTITIONER
Payer: COMMERCIAL

## 2021-02-22 ENCOUNTER — OFFICE VISIT (OUTPATIENT)
Dept: INTERNAL MEDICINE CLINIC | Facility: CLINIC | Age: 46
End: 2021-02-22
Payer: COMMERCIAL

## 2021-02-22 ENCOUNTER — LAB ENCOUNTER (OUTPATIENT)
Dept: LAB | Age: 46
End: 2021-02-22
Attending: NURSE PRACTITIONER
Payer: COMMERCIAL

## 2021-02-22 VITALS
HEART RATE: 77 BPM | TEMPERATURE: 95 F | RESPIRATION RATE: 16 BRPM | WEIGHT: 177.19 LBS | OXYGEN SATURATION: 99 % | HEIGHT: 67.5 IN | DIASTOLIC BLOOD PRESSURE: 84 MMHG | SYSTOLIC BLOOD PRESSURE: 116 MMHG | BODY MASS INDEX: 27.49 KG/M2

## 2021-02-22 DIAGNOSIS — L03.031 CELLULITIS OF TOE OF RIGHT FOOT: ICD-10-CM

## 2021-02-22 DIAGNOSIS — M79.674 PAIN OF TOE OF RIGHT FOOT: Primary | ICD-10-CM

## 2021-02-22 DIAGNOSIS — M79.674 PAIN OF TOE OF RIGHT FOOT: ICD-10-CM

## 2021-02-22 LAB — URATE SERPL-MCNC: 5.4 MG/DL

## 2021-02-22 PROCEDURE — 3074F SYST BP LT 130 MM HG: CPT | Performed by: NURSE PRACTITIONER

## 2021-02-22 PROCEDURE — 3008F BODY MASS INDEX DOCD: CPT | Performed by: NURSE PRACTITIONER

## 2021-02-22 PROCEDURE — 3079F DIAST BP 80-89 MM HG: CPT | Performed by: NURSE PRACTITIONER

## 2021-02-22 PROCEDURE — 99214 OFFICE O/P EST MOD 30 MIN: CPT | Performed by: NURSE PRACTITIONER

## 2021-02-22 PROCEDURE — 84550 ASSAY OF BLOOD/URIC ACID: CPT

## 2021-02-22 PROCEDURE — 36415 COLL VENOUS BLD VENIPUNCTURE: CPT

## 2021-02-22 RX ORDER — CEPHALEXIN 500 MG/1
500 CAPSULE ORAL 3 TIMES DAILY
Qty: 21 CAPSULE | Refills: 0 | Status: SHIPPED | OUTPATIENT
Start: 2021-02-22 | End: 2021-03-01

## 2021-02-22 RX ORDER — PREDNISONE 20 MG/1
20 TABLET ORAL DAILY
Qty: 7 TABLET | Refills: 0 | Status: SHIPPED | OUTPATIENT
Start: 2021-02-22 | End: 2021-03-01

## 2021-02-22 NOTE — PROGRESS NOTES
Carmine Sport is a 50year old male. CHIEF COMPLAINT   Right foot second toe pain and swelling    HPI:   The patients symptoms started one week ago. The pain was worse over the weekend but has been pretty consistent. It is 7/10 now.  There is swelling 77   Temp (!) 94.8 °F (34.9 °C) (Temporal)   Resp 16   Ht 5' 7.5\" (1.715 m)   Wt 177 lb 3.2 oz (80.4 kg)   SpO2 99%   BMI 27.34 kg/m²   Body mass index is 27.34 kg/m².    GENERAL: well developed, well nourished,in no apparent distress  SKIN: see photo of r Cellulitis of toe of right foot  - The patient has right second toe pain for one week that is 7/10 and constant. There is mild erythema and swelling. Impressive tenderness with light palpation. No trauma or injury.  No known hx of gout, but has had similar

## 2021-02-22 NOTE — PATIENT INSTRUCTIONS
Take antibiotic completely as ordered     Take antibiotic with food    Eat yogurt twice a day while on antibiotic or take an oral probiotic    Monitor for diarrhea and side effects    Take the prednisone daily in the morning with food.  Do not take NSAIDs w (ice cubes in a plastic bag wrapped in a thin towel) over the injured area for 20 minutes every 1 to 2 hours the first day for pain relief. Continue this 3 to 4 times a day for swelling and pain.   · Avoid alcohol and foods listed below (see Preventing sai

## 2021-03-01 RX ORDER — FLUTICASONE PROPIONATE 50 MCG
2 SPRAY, SUSPENSION (ML) NASAL DAILY
Qty: 16 G | Refills: 0 | Status: SHIPPED | OUTPATIENT
Start: 2021-03-01

## 2021-03-03 ENCOUNTER — APPOINTMENT (OUTPATIENT)
Dept: PHYSICAL THERAPY | Age: 46
End: 2021-03-03
Payer: COMMERCIAL

## 2021-03-03 ENCOUNTER — OFFICE VISIT (OUTPATIENT)
Dept: PHYSICAL THERAPY | Age: 46
End: 2021-03-03
Attending: ORTHOPAEDIC SURGERY
Payer: COMMERCIAL

## 2021-03-03 PROCEDURE — 97110 THERAPEUTIC EXERCISES: CPT

## 2021-03-03 NOTE — PROGRESS NOTES
Dx: Wrist pain   Authorized # of Visits: 8 POC  20/yr (3 used) Next MD Visit: as needed   Fall Risk: Standard  Precautions: None       Discharge Summary  Pt has attended 8 visits in Physical Therapy.      Subjective: Hasn't use wrist guard for the last week min  -Wall fermin position with arms at side 2x10 reps TherEx:  -Wall fermin position with arms at side 2x10 reps  -Hand intrinsic stretch, block radial border, pull ulnar border of hand in pronated position 10x10 sec hold TherEx:  -Wall fermin position with

## 2021-03-08 ENCOUNTER — OFFICE VISIT (OUTPATIENT)
Dept: PODIATRY CLINIC | Facility: CLINIC | Age: 46
End: 2021-03-08
Payer: COMMERCIAL

## 2021-03-08 DIAGNOSIS — M10.9 ACUTE GOUT INVOLVING TOE OF RIGHT FOOT, UNSPECIFIED CAUSE: ICD-10-CM

## 2021-03-08 DIAGNOSIS — M79.674 PAIN OF TOE OF RIGHT FOOT: ICD-10-CM

## 2021-03-08 DIAGNOSIS — L60.0 ONYCHOCRYPTOSIS: Primary | ICD-10-CM

## 2021-03-08 PROCEDURE — 99213 OFFICE O/P EST LOW 20 MIN: CPT | Performed by: PODIATRIST

## 2021-03-08 NOTE — PROGRESS NOTES
Theodore Crooks is a 50year old male. Patient presents with: Follow - Up: right foot pain for a couple of weeks- uric acid test was normal - denies any pain today.         HPI:   This pleasant gentleman returns to the clinic with a chief complaint of g of Onset   • Other (colon ca) Maternal Grandmother 40's   • Other (ulcerative colitis) Mother    • Breast Cancer Paternal Grandmother    • Other (lymphoma) Paternal Grandfather    • Cancer Maternal Grandfather         breast cancer   • Other (MI) Maternal breath with exertion  CARDIOVASCULAR: denies chest pain on exertion  GI: denies abdominal pain and denies heartburn  NEURO: denies headaches    EXAM:   There were no vitals taken for this visit.   GENERAL: well developed, well nourished, in no apparent dist

## 2021-03-31 ENCOUNTER — TELEPHONE (OUTPATIENT)
Dept: INTERNAL MEDICINE CLINIC | Facility: CLINIC | Age: 46
End: 2021-03-31

## 2021-03-31 NOTE — TELEPHONE ENCOUNTER
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 He would like a recommendation for a cardiologist so he can have some tests before the trip.   He has scheduled a physical with Dr Daniel Hutchison for May 7th and would l

## 2021-04-01 NOTE — TELEPHONE ENCOUNTER
If he has any specific concerns we can order a cardiac workup to check on his heart. There may be no need to go to cardiology specifically.

## 2021-04-21 NOTE — LETTER
OT-6B: Cancel/defer. Pt with not skilled OT needs at this time per interdisciplinary discussion and chart review. PT to address current needs. Will defer and complete OT order.    Patient Name: Larissa Shearer  YOB: 1975          MRN :  [id number]  Date:  12/9/2021  Referring Physician: Jonatan Laboy MD    Progress Summary  Pt has attended 8 visits in Physical Therapy.      Assessment: Able to achieve mild sympto me at Dept: [phone]. Sincerely,  Electronically signed by therapist: Jayro Nino PT, DPT     Please co-sign or sign and return this letter via fax as soon as possible to [phone].    I certify the need for these services furnished under this p

## 2021-05-07 ENCOUNTER — OFFICE VISIT (OUTPATIENT)
Dept: INTERNAL MEDICINE CLINIC | Facility: CLINIC | Age: 46
End: 2021-05-07
Payer: COMMERCIAL

## 2021-05-07 VITALS
TEMPERATURE: 98 F | SYSTOLIC BLOOD PRESSURE: 110 MMHG | HEIGHT: 67.5 IN | BODY MASS INDEX: 27.43 KG/M2 | HEART RATE: 72 BPM | DIASTOLIC BLOOD PRESSURE: 82 MMHG | OXYGEN SATURATION: 99 % | RESPIRATION RATE: 16 BRPM | WEIGHT: 176.81 LBS

## 2021-05-07 DIAGNOSIS — Z13.29 SCREENING FOR THYROID DISORDER: ICD-10-CM

## 2021-05-07 DIAGNOSIS — Z23 NEED FOR VACCINATION: ICD-10-CM

## 2021-05-07 DIAGNOSIS — Z00.00 ENCOUNTER FOR ANNUAL PHYSICAL EXAM: Primary | ICD-10-CM

## 2021-05-07 DIAGNOSIS — Z13.228 SCREENING FOR METABOLIC DISORDER: ICD-10-CM

## 2021-05-07 DIAGNOSIS — Z13.0 SCREENING FOR IRON DEFICIENCY ANEMIA: ICD-10-CM

## 2021-05-07 DIAGNOSIS — Z13.1 SCREENING FOR DIABETES MELLITUS: ICD-10-CM

## 2021-05-07 DIAGNOSIS — Z12.5 SCREENING FOR MALIGNANT NEOPLASM OF PROSTATE: ICD-10-CM

## 2021-05-07 DIAGNOSIS — Z13.220 SCREENING FOR LIPOID DISORDERS: ICD-10-CM

## 2021-05-07 PROCEDURE — 3079F DIAST BP 80-89 MM HG: CPT | Performed by: STUDENT IN AN ORGANIZED HEALTH CARE EDUCATION/TRAINING PROGRAM

## 2021-05-07 PROCEDURE — 99396 PREV VISIT EST AGE 40-64: CPT | Performed by: STUDENT IN AN ORGANIZED HEALTH CARE EDUCATION/TRAINING PROGRAM

## 2021-05-07 PROCEDURE — 90471 IMMUNIZATION ADMIN: CPT | Performed by: STUDENT IN AN ORGANIZED HEALTH CARE EDUCATION/TRAINING PROGRAM

## 2021-05-07 PROCEDURE — 3074F SYST BP LT 130 MM HG: CPT | Performed by: STUDENT IN AN ORGANIZED HEALTH CARE EDUCATION/TRAINING PROGRAM

## 2021-05-07 PROCEDURE — 3008F BODY MASS INDEX DOCD: CPT | Performed by: STUDENT IN AN ORGANIZED HEALTH CARE EDUCATION/TRAINING PROGRAM

## 2021-05-07 PROCEDURE — 90715 TDAP VACCINE 7 YRS/> IM: CPT | Performed by: STUDENT IN AN ORGANIZED HEALTH CARE EDUCATION/TRAINING PROGRAM

## 2021-05-07 NOTE — PATIENT INSTRUCTIONS
Prevention Guidelines, Men Ages 36 to 52  Start Multivitamins: \"Once a Day\" for Men  Screening tests and vaccines are an important part of managing your health.  A screening test is done to find possible disorders or diseases in people who don't have an Syphilis Men at increased risk for infection – talk with your healthcare provider At routine exams   Tuberculosis Men at increased risk for infection – talk with your healthcare provider Check with your healthcare provider   Vision All men in this age [de-identified] who are overweight or obese When diagnosed, and then at routine exams   Sexually transmitted infection prevention Men at increased risk for infection – talk with your healthcare provider At routine exams   Use of daily aspirin Men ages 39 to 78 at risk for

## 2021-05-07 NOTE — PROGRESS NOTES
[facility]    REASON FOR VISIT:    Duyen Celis is a 50year old male who presents for an [address]. Current Complaints: feeling well.  Reports compliance with the medications, denies any side effects  Vaccinations: TDap 20 Screening Screen those at high risk plus screen one time for adults born 1947-1 9[birth year] No results found for: HCVAB    Tuberculosis Screen If high risk No components found for: PPDINDURAT      ALLERGIES:   No Known Allergies    CURRENT MEDICATIONS:   Current O Alcohol use: Yes      Comment: 1 beer per month    Drug use: No         REVIEW OF SYSTEMS:   Constitutional: Negative for fever, chills and fatigue. HENT: Negative for hearing loss, congestion, sore throat and neck pain.     Eyes: Negative for pain and vi lymphadenopathy. Cardiovascular: Normal rate, regular rhythm and normal heart sounds. Exam reveals no friction rub. No murmur heard. Pulmonary/Chest: Effort normal and breath sounds normal. He has no wheezes. He has no rales. Abdominal: Soft.  Bowel so patient indicates understanding of these issues and agrees to the plan. The patient is asked to return for physical in 1 year or sooner prn.     Screening for malignant neoplasm of prostate  -     PSA SCREEN; Future    Screening for metabolic disorder  - 11/09/2017, 10/11/2018, 11/12/2019   • Influenza 11/11/2008, 10/03/2009, 11/05/2018   • TDAP 04/14/2011, 05/07/2021       Influenza Annually   Pneumococcal if high risk   Td/Tdap once then every 10 years   HPV Males 11-21   Zoster (Shingles) 60 and older:

## 2021-05-18 ENCOUNTER — MED REC SCAN ONLY (OUTPATIENT)
Dept: INTERNAL MEDICINE CLINIC | Facility: CLINIC | Age: 46
End: 2021-05-18

## 2021-07-08 ENCOUNTER — TELEPHONE (OUTPATIENT)
Dept: INTERNAL MEDICINE CLINIC | Facility: CLINIC | Age: 46
End: 2021-07-08

## 2021-07-28 ENCOUNTER — LAB ENCOUNTER (OUTPATIENT)
Dept: LAB | Age: 46
End: 2021-07-28
Attending: STUDENT IN AN ORGANIZED HEALTH CARE EDUCATION/TRAINING PROGRAM
Payer: COMMERCIAL

## 2021-07-28 ENCOUNTER — PATIENT MESSAGE (OUTPATIENT)
Dept: INTERNAL MEDICINE CLINIC | Facility: CLINIC | Age: 46
End: 2021-07-28

## 2021-07-28 DIAGNOSIS — Z13.29 SCREENING FOR THYROID DISORDER: ICD-10-CM

## 2021-07-28 DIAGNOSIS — Z13.1 SCREENING FOR DIABETES MELLITUS: ICD-10-CM

## 2021-07-28 DIAGNOSIS — Z13.0 SCREENING FOR IRON DEFICIENCY ANEMIA: ICD-10-CM

## 2021-07-28 DIAGNOSIS — Z12.5 SCREENING FOR MALIGNANT NEOPLASM OF PROSTATE: ICD-10-CM

## 2021-07-28 DIAGNOSIS — Z13.220 SCREENING FOR LIPOID DISORDERS: ICD-10-CM

## 2021-07-28 DIAGNOSIS — Z13.228 SCREENING FOR METABOLIC DISORDER: ICD-10-CM

## 2021-07-28 LAB
ALBUMIN SERPL-MCNC: 3.6 G/DL (ref 3.4–5)
ALBUMIN/GLOB SERPL: 1.1 {RATIO} (ref 1–2)
ALP LIVER SERPL-CCNC: 86 U/L
ALT SERPL-CCNC: 47 U/L
ANION GAP SERPL CALC-SCNC: 5 MMOL/L (ref 0–18)
AST SERPL-CCNC: 21 U/L (ref 15–37)
BASOPHILS # BLD AUTO: 0.07 X10(3) UL (ref 0–0.2)
BASOPHILS NFR BLD AUTO: 0.8 %
BILIRUB SERPL-MCNC: 0.4 MG/DL (ref 0.1–2)
BUN BLD-MCNC: 16 MG/DL (ref 7–18)
BUN/CREAT SERPL: 16 (ref 10–20)
CALCIUM BLD-MCNC: 8.7 MG/DL (ref 8.5–10.1)
CHLORIDE SERPL-SCNC: 108 MMOL/L (ref 98–112)
CHOLEST SMN-MCNC: 156 MG/DL (ref ?–200)
CO2 SERPL-SCNC: 26 MMOL/L (ref 21–32)
COMPLEXED PSA SERPL-MCNC: 0.54 NG/ML (ref ?–4)
CREAT BLD-MCNC: 1 MG/DL
DEPRECATED RDW RBC AUTO: 42.5 FL (ref 35.1–46.3)
EOSINOPHIL # BLD AUTO: 0.27 X10(3) UL (ref 0–0.7)
EOSINOPHIL NFR BLD AUTO: 3 %
ERYTHROCYTE [DISTWIDTH] IN BLOOD BY AUTOMATED COUNT: 13.1 % (ref 11–15)
EST. AVERAGE GLUCOSE BLD GHB EST-MCNC: 108 MG/DL (ref 68–126)
GLOBULIN PLAS-MCNC: 3.4 G/DL (ref 2.8–4.4)
GLUCOSE BLD-MCNC: 96 MG/DL (ref 70–99)
HBA1C MFR BLD HPLC: 5.4 % (ref ?–5.7)
HCT VFR BLD AUTO: 44 %
HDLC SERPL-MCNC: 43 MG/DL (ref 40–59)
HGB BLD-MCNC: 14.8 G/DL
IMM GRANULOCYTES # BLD AUTO: 0.15 X10(3) UL (ref 0–1)
IMM GRANULOCYTES NFR BLD: 1.7 %
LDLC SERPL CALC-MCNC: 80 MG/DL (ref ?–100)
LYMPHOCYTES # BLD AUTO: 2.4 X10(3) UL (ref 1–4)
LYMPHOCYTES NFR BLD AUTO: 26.5 %
M PROTEIN MFR SERPL ELPH: 7 G/DL (ref 6.4–8.2)
MCH RBC QN AUTO: 29.9 PG (ref 26–34)
MCHC RBC AUTO-ENTMCNC: 33.6 G/DL (ref 31–37)
MCV RBC AUTO: 88.9 FL
MONOCYTES # BLD AUTO: 0.62 X10(3) UL (ref 0.1–1)
MONOCYTES NFR BLD AUTO: 6.9 %
NEUTROPHILS # BLD AUTO: 5.53 X10 (3) UL (ref 1.5–7.7)
NEUTROPHILS # BLD AUTO: 5.53 X10(3) UL (ref 1.5–7.7)
NEUTROPHILS NFR BLD AUTO: 61.1 %
NONHDLC SERPL-MCNC: 113 MG/DL (ref ?–130)
OSMOLALITY SERPL CALC.SUM OF ELEC: 289 MOSM/KG (ref 275–295)
PATIENT FASTING Y/N/NP: YES
PATIENT FASTING Y/N/NP: YES
PLATELET # BLD AUTO: 259 10(3)UL (ref 150–450)
POTASSIUM SERPL-SCNC: 4.2 MMOL/L (ref 3.5–5.1)
RBC # BLD AUTO: 4.95 X10(6)UL
SODIUM SERPL-SCNC: 139 MMOL/L (ref 136–145)
TRIGL SERPL-MCNC: 198 MG/DL (ref 30–149)
TSI SER-ACNC: 2.44 MIU/ML (ref 0.36–3.74)
VLDLC SERPL CALC-MCNC: 31 MG/DL (ref 0–30)
WBC # BLD AUTO: 9 X10(3) UL (ref 4–11)

## 2021-07-28 PROCEDURE — 36415 COLL VENOUS BLD VENIPUNCTURE: CPT

## 2021-07-28 PROCEDURE — 83036 HEMOGLOBIN GLYCOSYLATED A1C: CPT

## 2021-07-28 PROCEDURE — 80053 COMPREHEN METABOLIC PANEL: CPT

## 2021-07-28 PROCEDURE — 84443 ASSAY THYROID STIM HORMONE: CPT

## 2021-07-28 PROCEDURE — 80061 LIPID PANEL: CPT

## 2021-07-28 PROCEDURE — 85025 COMPLETE CBC W/AUTO DIFF WBC: CPT

## 2021-07-29 NOTE — TELEPHONE ENCOUNTER
From: Siri Hedrick  To: [facility], APRN  Sent: 7/28/2021 12:22 PM CDT  Subject: Test Results Question    Is there anything I need to be aware of re: my Lipid panel results?  I think they are better than they have been in recent years, but still

## 2021-08-09 ENCOUNTER — PATIENT MESSAGE (OUTPATIENT)
Dept: INTERNAL MEDICINE CLINIC | Facility: CLINIC | Age: 46
End: 2021-08-09

## 2021-08-09 NOTE — TELEPHONE ENCOUNTER
From: Lexie Toussaint  To: DEIRDRE Nguyen  Sent: 8/9/2021 12:11 PM CDT  Subject: Non-Urgent Medical Question    I had the J&J COVID vaccine on April 1.  I am seeing news that if you had the J&J that you may want to get a booster shot to protect y

## 2021-10-27 ENCOUNTER — TELEPHONE (OUTPATIENT)
Dept: PHYSICAL THERAPY | Facility: HOSPITAL | Age: 46
End: 2021-10-27

## 2021-10-28 ENCOUNTER — OFFICE VISIT (OUTPATIENT)
Dept: PHYSICAL THERAPY | Age: 46
End: 2021-10-28
Attending: FAMILY MEDICINE
Payer: COMMERCIAL

## 2021-10-28 DIAGNOSIS — M25.511 ACUTE PAIN OF RIGHT SHOULDER: ICD-10-CM

## 2021-10-28 PROCEDURE — 97161 PT EVAL LOW COMPLEX 20 MIN: CPT

## 2021-10-28 PROCEDURE — 97110 THERAPEUTIC EXERCISES: CPT

## 2021-10-28 NOTE — PROGRESS NOTES
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  Pt was also provided recommendations for activity modifications, possible soreness after evaluation, moda

## 2021-11-01 ENCOUNTER — OFFICE VISIT (OUTPATIENT)
Dept: PHYSICAL THERAPY | Age: 46
End: 2021-11-01
Attending: FAMILY MEDICINE
Payer: COMMERCIAL

## 2021-11-01 PROCEDURE — 97140 MANUAL THERAPY 1/> REGIONS: CPT

## 2021-11-01 PROCEDURE — 97110 THERAPEUTIC EXERCISES: CPT

## 2021-11-01 NOTE — PROGRESS NOTES
Dx: Right subacromial pain syndrome   Authorized # of Visits: 8 POC Next MD Visit: None   Fall Risk: Standard  Precautions: None      Subjective: Went camping over the weekend, had increased shoulder pain trying to get comfortable without the pillow.  Wilbert Burn

## 2021-11-05 ENCOUNTER — OFFICE VISIT (OUTPATIENT)
Dept: PHYSICAL THERAPY | Age: 46
End: 2021-11-05
Attending: FAMILY MEDICINE
Payer: COMMERCIAL

## 2021-11-05 PROCEDURE — 97140 MANUAL THERAPY 1/> REGIONS: CPT

## 2021-11-05 PROCEDURE — 97110 THERAPEUTIC EXERCISES: CPT

## 2021-11-05 NOTE — PROGRESS NOTES
Dx: Right subacromial pain syndrome   Authorized # of Visits: 8 POC Next MD Visit: None   Fall Risk: Standard  Precautions: None      Subjective: Having a twinge in right elbow with resisted ER.  Shoulder was doing better the past few days, currently feels

## 2021-11-09 ENCOUNTER — OFFICE VISIT (OUTPATIENT)
Dept: PHYSICAL THERAPY | Age: 46
End: 2021-11-09
Attending: FAMILY MEDICINE
Payer: COMMERCIAL

## 2021-11-09 DIAGNOSIS — M25.531 ARTHRALGIA OF RIGHT FOREARM: ICD-10-CM

## 2021-11-09 DIAGNOSIS — M65.322 TRIGGER INDEX FINGER OF LEFT HAND: ICD-10-CM

## 2021-11-09 DIAGNOSIS — M65.321 TRIGGER INDEX FINGER OF RIGHT HAND: ICD-10-CM

## 2021-11-09 PROCEDURE — 97140 MANUAL THERAPY 1/> REGIONS: CPT

## 2021-11-09 PROCEDURE — 97110 THERAPEUTIC EXERCISES: CPT

## 2021-11-09 NOTE — PROGRESS NOTES
Dx: Right subacromial pain syndrome   Authorized # of Visits: 8 POC Next MD Visit: None   Fall Risk: Standard  Precautions: None      Subjective: No shoulder pain at rest, but still having pain with moving arm.      Objective:   RBB: 6/10 pain L5 (7/10pain, spouse

## 2021-11-11 ENCOUNTER — PATIENT MESSAGE (OUTPATIENT)
Dept: INTERNAL MEDICINE CLINIC | Facility: CLINIC | Age: 46
End: 2021-11-11

## 2021-11-11 NOTE — TELEPHONE ENCOUNTER
Advised pt to review information on [facility] and our website for the booster. Since it is not mandatory at  This time we are unable to advise.  Informed him he can schedule at the [place] location if he meets the qualifications listed on our website

## 2021-11-11 NOTE — TELEPHONE ENCOUNTER
From: Juany Monson  To: DEIRDRE Abdi  Sent: 11/11/2021 8:13 AM CST  Subject: Covid Vaccine Booster    I got the J&J Covid vaccine on 4/1/21. I would like to get a booster but I am unsure if I should get another J&J or the [facility] shot.  I hoyt

## 2021-11-12 ENCOUNTER — OFFICE VISIT (OUTPATIENT)
Dept: PHYSICAL THERAPY | Age: 46
End: 2021-11-12
Attending: FAMILY MEDICINE
Payer: COMMERCIAL

## 2021-11-12 DIAGNOSIS — M65.322 TRIGGER INDEX FINGER OF LEFT HAND: ICD-10-CM

## 2021-11-12 DIAGNOSIS — M65.321 TRIGGER INDEX FINGER OF RIGHT HAND: ICD-10-CM

## 2021-11-12 DIAGNOSIS — M25.539 ARTHRALGIA OF FOREARM, UNSPECIFIED LATERALITY: ICD-10-CM

## 2021-11-12 PROCEDURE — 97110 THERAPEUTIC EXERCISES: CPT

## 2021-11-12 PROCEDURE — 97140 MANUAL THERAPY 1/> REGIONS: CPT

## 2021-11-12 NOTE — PROGRESS NOTES
Dx: Right subacromial pain syndrome   Authorized # of Visits: 8 POC Next MD Visit: None   Fall Risk: Standard  Precautions: None      Subjective: Feeling residual soreness from the exercises earlier this week. Currently 5-6/10 lateal shoulder pain.  Having AP gr III, full IR (shoulder 90 ABD), 3x3min Manual:  -Scapular posterior tilt mobilization gr III x5 min  -Pec minor release x5 min        HEP: prone scapular retraction, seated thoracic extension, isometric shoulder ER    Charges:  TherEx x2 (30 min), Man

## 2021-11-16 ENCOUNTER — APPOINTMENT (OUTPATIENT)
Dept: PHYSICAL THERAPY | Age: 46
End: 2021-11-16
Attending: FAMILY MEDICINE
Payer: COMMERCIAL

## 2021-11-19 ENCOUNTER — OFFICE VISIT (OUTPATIENT)
Dept: PHYSICAL THERAPY | Age: 46
End: 2021-11-19
Attending: FAMILY MEDICINE
Payer: COMMERCIAL

## 2021-11-19 DIAGNOSIS — M25.531 ARTHRALGIA OF RIGHT FOREARM: ICD-10-CM

## 2021-11-19 DIAGNOSIS — M65.321 TRIGGER INDEX FINGER OF RIGHT HAND: ICD-10-CM

## 2021-11-19 DIAGNOSIS — M65.322 TRIGGER INDEX FINGER OF LEFT HAND: ICD-10-CM

## 2021-11-19 PROCEDURE — 97110 THERAPEUTIC EXERCISES: CPT

## 2021-11-19 PROCEDURE — 97140 MANUAL THERAPY 1/> REGIONS: CPT

## 2021-11-19 NOTE — PROGRESS NOTES
Dx: Right subacromial pain syndrome   Authorized # of Visits: 8 POC Next MD Visit: None   Fall Risk: Standard  Precautions: None      Subjective: Was sore on Saturday from Friday's session. Did a lot of work on Saturday, so it was sore.  Had Covid booster v min  -Pec stretch with hands behind head 10x30 sec hold TherEx:  -Passive pec minor stretch 4x30 sec hold  -Thoracic extension on foam roller, x5 min  -Pec stretch with hands behind head 10x30 sec hold     Manual:  -GHJ AP gr III, neutral position, 2x4 min

## 2021-11-22 ENCOUNTER — APPOINTMENT (OUTPATIENT)
Dept: PHYSICAL THERAPY | Age: 46
End: 2021-11-22
Attending: FAMILY MEDICINE
Payer: COMMERCIAL

## 2021-11-23 ENCOUNTER — TELEPHONE (OUTPATIENT)
Dept: PHYSICAL THERAPY | Facility: HOSPITAL | Age: 46
End: 2021-11-23

## 2021-11-30 ENCOUNTER — APPOINTMENT (OUTPATIENT)
Dept: PHYSICAL THERAPY | Age: 46
End: 2021-11-30
Attending: FAMILY MEDICINE
Payer: COMMERCIAL

## 2021-12-03 ENCOUNTER — OFFICE VISIT (OUTPATIENT)
Dept: PHYSICAL THERAPY | Age: 46
End: 2021-12-03
Attending: FAMILY MEDICINE
Payer: COMMERCIAL

## 2021-12-03 PROCEDURE — 97110 THERAPEUTIC EXERCISES: CPT

## 2021-12-03 PROCEDURE — 97140 MANUAL THERAPY 1/> REGIONS: CPT

## 2021-12-03 NOTE — PROGRESS NOTES
Dx: Right subacromial pain syndrome   Authorized # of Visits: 8 POC Next MD Visit: None   Fall Risk: Standard  Precautions: None      Subjective: Has been feeling more of an exercise soreness the past week, has been doing more of the exercises.     Current hands behind head 10x30 sec hold TherEx:  -Passive pec minor stretch 4x30 sec hold  -Thoracic extension on foam roller, x5 min  -Pec stretch with hands behind head 10x30 sec hold TherEx:  -Thoracic extension on foam roller, x5 min  -Pec stretch with hands

## 2021-12-09 ENCOUNTER — OFFICE VISIT (OUTPATIENT)
Dept: PHYSICAL THERAPY | Age: 46
End: 2021-12-09
Attending: FAMILY MEDICINE
Payer: COMMERCIAL

## 2021-12-09 PROCEDURE — 97110 THERAPEUTIC EXERCISES: CPT

## 2021-12-09 PROCEDURE — 97140 MANUAL THERAPY 1/> REGIONS: CPT

## 2021-12-09 NOTE — PROGRESS NOTES
Dx: Right subacromial pain syndrome   Authorized # of Visits: 8 POC Next MD Visit: None   Fall Risk: Standard  Precautions: None       Progress Summary  Pt has attended 8 visits in Physical Therapy. Subjective: \"My shoulder is hurting all the time. \" Continue skilled Physical Therapy for up to 8 additional visits over a 90 day period. Patient will contact referring provider to discuss additional options for pain management.         Patient was advised of these findings, precautions, and treatment option head 10x30 sec hold  -Shoulder IR, red band, 2x10 reps  -Supine scapular protraction 2x10 reps TherEx:  -Pec stretch with hands behind head 10x30 sec hold  -Supine scapular protraction 2x10 reps  -Seated scapular retraction, 10x10 sec hold  -Kinesiotape \"

## 2021-12-13 ENCOUNTER — OFFICE VISIT (OUTPATIENT)
Dept: PHYSICAL THERAPY | Age: 46
End: 2021-12-13
Attending: FAMILY MEDICINE
Payer: COMMERCIAL

## 2021-12-13 PROCEDURE — 97110 THERAPEUTIC EXERCISES: CPT

## 2021-12-13 PROCEDURE — 97140 MANUAL THERAPY 1/> REGIONS: CPT

## 2021-12-13 NOTE — PROGRESS NOTES
Dx: Right subacromial pain syndrome   Authorized # of Visits: 12 POC Next MD Visit: None   Fall Risk: Standard  Precautions: None      Subjective: It's still hurting with any shoulder motion. Resting pain is still consistent. Sleep still impacted.  Edis Hunter 4x30 sec hold  -Thoracic extension on foam roller, x5 min  -Pec stretch with hands behind head 10x30 sec hold TherEx:  -Thoracic extension on foam roller, x5 min  -Pec stretch with hands behind head 10x30 sec hold  -Shoulder IR, red band, 2x10 reps  -[employer]

## 2021-12-16 ENCOUNTER — APPOINTMENT (OUTPATIENT)
Dept: PHYSICAL THERAPY | Age: 46
End: 2021-12-16
Attending: FAMILY MEDICINE
Payer: COMMERCIAL

## 2021-12-20 ENCOUNTER — OFFICE VISIT (OUTPATIENT)
Dept: PHYSICAL THERAPY | Age: 46
End: 2021-12-20
Attending: FAMILY MEDICINE
Payer: COMMERCIAL

## 2021-12-20 PROCEDURE — 97140 MANUAL THERAPY 1/> REGIONS: CPT

## 2021-12-20 PROCEDURE — 97110 THERAPEUTIC EXERCISES: CPT

## 2021-12-20 NOTE — PROGRESS NOTES
Dx: Right subacromial pain syndrome   Authorized # of Visits: 12 POC Next MD Visit: None   Fall Risk: Standard  Precautions: None      Subjective: Pain has been consistent since last session. Has been taking Ibuprofen for past 4 days, icing more regularly. hold  -Shoulder IR, red band, 2x10 reps  -Supine scapular protraction 2x10 reps TherEx:  -Pec stretch with hands behind head 10x30 sec hold  -Supine scapular protraction 2x10 reps  -Seated scapular retraction, 10x10 sec hold  -Kinesiotape \"I\" strip from

## 2021-12-21 ENCOUNTER — APPOINTMENT (OUTPATIENT)
Dept: PHYSICAL THERAPY | Age: 46
End: 2021-12-21
Attending: FAMILY MEDICINE
Payer: COMMERCIAL

## 2021-12-22 ENCOUNTER — APPOINTMENT (OUTPATIENT)
Dept: PHYSICAL THERAPY | Age: 46
End: 2021-12-22
Attending: FAMILY MEDICINE
Payer: COMMERCIAL

## 2021-12-28 ENCOUNTER — APPOINTMENT (OUTPATIENT)
Dept: PHYSICAL THERAPY | Age: 46
End: 2021-12-28
Attending: FAMILY MEDICINE
Payer: COMMERCIAL

## 2022-01-06 ENCOUNTER — OFFICE VISIT (OUTPATIENT)
Dept: PHYSICAL THERAPY | Age: 47
End: 2022-01-06
Attending: FAMILY MEDICINE
Payer: COMMERCIAL

## 2022-01-06 PROCEDURE — 97140 MANUAL THERAPY 1/> REGIONS: CPT

## 2022-01-06 PROCEDURE — 97110 THERAPEUTIC EXERCISES: CPT

## 2022-01-06 NOTE — PROGRESS NOTES
Dx: Right subacromial pain syndrome   Authorized # of Visits: 12 POC Next MD Visit: Schedule if needed   Fall Risk: Standard  Precautions: None      Subjective: Still having ache shoulder pain. Had MRI earlier this week (see below).  Has noticed some left s extension on foam roller, x5 min  -Pec stretch with hands behind head 10x30 sec hold  -Shoulder IR, red band, 2x10 reps  -Supine scapular protraction 2x10 reps TherEx:  -Pec stretch with hands behind head 10x30 sec hold  -Supine scapular protraction 2x10 r

## 2022-01-14 ENCOUNTER — OFFICE VISIT (OUTPATIENT)
Dept: PHYSICAL THERAPY | Age: 47
End: 2022-01-14
Attending: FAMILY MEDICINE
Payer: COMMERCIAL

## 2022-01-14 PROCEDURE — 97140 MANUAL THERAPY 1/> REGIONS: CPT

## 2022-01-14 PROCEDURE — 97110 THERAPEUTIC EXERCISES: CPT

## 2022-01-14 NOTE — PROGRESS NOTES
Dx: Right subacromial pain syndrome   Authorized # of Visits: 12 POC Next MD Visit: Schedule if needed   Fall Risk: Standard  Precautions: None      Subjective:Not having as much pain at rest. The frequency of acute pain has reduced, but can still happen. strip from anterior to posterior shoulder x3 min TherEx:  -Prone scapular retraction, bilateral, [id number] sec hold  -Seated thoracic extension, x20 reps  -Shoulder ER isometric, standing, R, [id number] hold  -Shoulder ABD with scapular upward rotation assist

## 2022-01-20 ENCOUNTER — OFFICE VISIT (OUTPATIENT)
Dept: PHYSICAL THERAPY | Age: 47
End: 2022-01-20
Attending: FAMILY MEDICINE
Payer: COMMERCIAL

## 2022-01-20 PROCEDURE — 97140 MANUAL THERAPY 1/> REGIONS: CPT

## 2022-01-20 PROCEDURE — 97110 THERAPEUTIC EXERCISES: CPT

## 2022-01-20 NOTE — PROGRESS NOTES
Dx: Right subacromial pain syndrome   Authorized # of Visits: 12 POC Next MD Visit: Schedule if needed   Fall Risk: Standard  Precautions: None      Subjective: Not having the pain at rest, but still pain with reaching across body and out to side   Current assist x10 reps TherEx:  -Self pec minor release with tennis ball, R, x5 min  -Seated thoracic extension, x20 reps  -Shoulder ABD with scapular upward rotation assist x10 reps TherEx:  -Pec stretch hands behind head x20 reps  -Seated thoracic extension, x2

## 2022-01-27 ENCOUNTER — OFFICE VISIT (OUTPATIENT)
Dept: PHYSICAL THERAPY | Age: 47
End: 2022-01-27
Attending: FAMILY MEDICINE
Payer: COMMERCIAL

## 2022-01-27 PROCEDURE — 97110 THERAPEUTIC EXERCISES: CPT

## 2022-01-27 PROCEDURE — 97140 MANUAL THERAPY 1/> REGIONS: CPT

## 2022-01-27 NOTE — PROGRESS NOTES
Dx: Right subacromial pain syndrome   Authorized # of Visits: 12 POC Next MD Visit: Schedule if needed   Fall Risk: Standard  Precautions: None      Subjective: \"It's surprising how fatiguing the new exercises are. \" Still having the pain with reaching be scapular upward rotation assist x10 reps TherEx:  -Pec stretch hands behind head x20 reps  -Seated thoracic extension, x20 reps  -Shoulder ABD with scapular upward rotation assist x10 reps  -1st rib inferior glide, belt assisted, x2 min TherEx:  -Shoulder

## 2022-02-03 ENCOUNTER — TELEPHONE (OUTPATIENT)
Dept: PHYSICAL THERAPY | Facility: HOSPITAL | Age: 47
End: 2022-02-03

## 2022-02-03 ENCOUNTER — APPOINTMENT (OUTPATIENT)
Dept: PHYSICAL THERAPY | Age: 47
End: 2022-02-03
Attending: FAMILY MEDICINE
Payer: COMMERCIAL

## 2022-02-10 ENCOUNTER — OFFICE VISIT (OUTPATIENT)
Dept: PHYSICAL THERAPY | Age: 47
End: 2022-02-10
Attending: FAMILY MEDICINE
Payer: COMMERCIAL

## 2022-02-10 PROCEDURE — 97110 THERAPEUTIC EXERCISES: CPT

## 2022-02-10 PROCEDURE — 97140 MANUAL THERAPY 1/> REGIONS: CPT

## 2022-02-17 ENCOUNTER — OFFICE VISIT (OUTPATIENT)
Dept: PHYSICAL THERAPY | Age: 47
End: 2022-02-17
Attending: FAMILY MEDICINE
Payer: COMMERCIAL

## 2022-02-17 PROCEDURE — 97110 THERAPEUTIC EXERCISES: CPT

## 2022-02-17 PROCEDURE — 97140 MANUAL THERAPY 1/> REGIONS: CPT

## 2022-02-24 ENCOUNTER — APPOINTMENT (OUTPATIENT)
Dept: PHYSICAL THERAPY | Age: 47
End: 2022-02-24
Attending: FAMILY MEDICINE
Payer: COMMERCIAL

## 2022-03-03 ENCOUNTER — APPOINTMENT (OUTPATIENT)
Dept: PHYSICAL THERAPY | Age: 47
End: 2022-03-03
Attending: FAMILY MEDICINE
Payer: COMMERCIAL

## 2022-03-04 ENCOUNTER — OFFICE VISIT (OUTPATIENT)
Dept: PHYSICAL THERAPY | Age: 47
End: 2022-03-04
Attending: FAMILY MEDICINE
Payer: COMMERCIAL

## 2022-03-04 PROCEDURE — 97110 THERAPEUTIC EXERCISES: CPT

## 2022-03-04 PROCEDURE — 97140 MANUAL THERAPY 1/> REGIONS: CPT

## 2022-03-10 ENCOUNTER — APPOINTMENT (OUTPATIENT)
Dept: PHYSICAL THERAPY | Age: 47
End: 2022-03-10
Attending: FAMILY MEDICINE
Payer: COMMERCIAL

## 2022-03-17 ENCOUNTER — OFFICE VISIT (OUTPATIENT)
Dept: PHYSICAL THERAPY | Age: 47
End: 2022-03-17
Attending: FAMILY MEDICINE
Payer: COMMERCIAL

## 2022-03-17 PROCEDURE — 97110 THERAPEUTIC EXERCISES: CPT

## 2022-03-17 PROCEDURE — 97140 MANUAL THERAPY 1/> REGIONS: CPT

## 2022-03-24 ENCOUNTER — OFFICE VISIT (OUTPATIENT)
Dept: PHYSICAL THERAPY | Age: 47
End: 2022-03-24
Attending: FAMILY MEDICINE
Payer: COMMERCIAL

## 2022-03-24 PROCEDURE — 97140 MANUAL THERAPY 1/> REGIONS: CPT

## 2022-03-24 PROCEDURE — 97110 THERAPEUTIC EXERCISES: CPT

## 2022-03-24 NOTE — PATIENT INSTRUCTIONS
Sleeper Stretch Position: resist shoulder internal rotation instead of external rotation - left hand placed under right forearm, right arm pressing down towards table.

## 2022-04-07 ENCOUNTER — OFFICE VISIT (OUTPATIENT)
Dept: PHYSICAL THERAPY | Age: 47
End: 2022-04-07
Attending: FAMILY MEDICINE
Payer: COMMERCIAL

## 2022-04-07 PROCEDURE — 97110 THERAPEUTIC EXERCISES: CPT

## 2022-04-07 NOTE — PATIENT INSTRUCTIONS
Home Exercise:  1. Wall push-up with lift off - 10 reps, 2-3 sets  2. Hands on balance disc, rotating arm/upper body side to side - 10 reps, 2-3 sets  3. Quadruped (hands and knees position), alternate lifting arm/leg - 5 sec hold, 15-20 reps  4.  Row, lying on stomach - 10 reps, 2-3 sets

## 2022-04-21 ENCOUNTER — OFFICE VISIT (OUTPATIENT)
Dept: PHYSICAL THERAPY | Age: 47
End: 2022-04-21
Attending: FAMILY MEDICINE
Payer: COMMERCIAL

## 2022-04-21 PROCEDURE — 97110 THERAPEUTIC EXERCISES: CPT

## 2022-04-21 NOTE — PATIENT INSTRUCTIONS
Home Exercise:  ADD:   -band resisted exercise to mimic swimming motion 2-3 sets, 10 reps  -Quadruped exercise: place hands on round foam roller for a greater challenge    REMOVE: Wall rotation stretch

## 2022-05-11 ENCOUNTER — OFFICE VISIT (OUTPATIENT)
Dept: PHYSICAL THERAPY | Age: 47
End: 2022-05-11
Attending: FAMILY MEDICINE
Payer: COMMERCIAL

## 2022-05-11 PROCEDURE — 97110 THERAPEUTIC EXERCISES: CPT

## 2022-05-18 PROBLEM — M75.41 ROTATOR CUFF IMPINGEMENT SYNDROME OF RIGHT SHOULDER: Status: ACTIVE | Noted: 2021-10-04

## 2022-05-20 ENCOUNTER — OFFICE VISIT (OUTPATIENT)
Dept: INTERNAL MEDICINE CLINIC | Facility: CLINIC | Age: 47
End: 2022-05-20
Payer: COMMERCIAL

## 2022-05-20 VITALS
DIASTOLIC BLOOD PRESSURE: 60 MMHG | SYSTOLIC BLOOD PRESSURE: 118 MMHG | WEIGHT: 181 LBS | HEART RATE: 88 BPM | RESPIRATION RATE: 16 BRPM | HEIGHT: 67 IN | TEMPERATURE: 98 F | OXYGEN SATURATION: 99 % | BODY MASS INDEX: 28.41 KG/M2

## 2022-05-20 DIAGNOSIS — G47.33 OSA (OBSTRUCTIVE SLEEP APNEA): ICD-10-CM

## 2022-05-20 DIAGNOSIS — M75.41 ROTATOR CUFF IMPINGEMENT SYNDROME OF RIGHT SHOULDER: ICD-10-CM

## 2022-05-20 DIAGNOSIS — Z13.29 SCREENING FOR THYROID DISORDER: ICD-10-CM

## 2022-05-20 DIAGNOSIS — Z12.11 SCREENING FOR COLON CANCER: ICD-10-CM

## 2022-05-20 DIAGNOSIS — Z13.220 SCREENING FOR CHOLESTEROL LEVEL: ICD-10-CM

## 2022-05-20 DIAGNOSIS — Z00.00 ENCOUNTER FOR ANNUAL PHYSICAL EXAM: Primary | ICD-10-CM

## 2022-05-20 PROCEDURE — 3078F DIAST BP <80 MM HG: CPT | Performed by: NURSE PRACTITIONER

## 2022-05-20 PROCEDURE — 3074F SYST BP LT 130 MM HG: CPT | Performed by: NURSE PRACTITIONER

## 2022-05-20 PROCEDURE — 99396 PREV VISIT EST AGE 40-64: CPT | Performed by: NURSE PRACTITIONER

## 2022-05-20 PROCEDURE — 3008F BODY MASS INDEX DOCD: CPT | Performed by: NURSE PRACTITIONER

## 2022-05-20 NOTE — PATIENT INSTRUCTIONS
Get your labs done. You should be fasting for at least 10 hours. If you take a multivitamin with Biotin or any biotin product it should be held for 3 days prior to getting your labs done. You will be due in August to have a repeat colonoscopy. You may make an apt with Dr. Ramu Tim. Thanks.     Follow up in 1 year or sooner as needed

## 2022-06-20 ENCOUNTER — TELEPHONE (OUTPATIENT)
Dept: INTERNAL MEDICINE CLINIC | Facility: CLINIC | Age: 47
End: 2022-06-20

## 2022-06-20 NOTE — TELEPHONE ENCOUNTER
Patient's wife has COVID and patient started showing symptoms today. He took a home COVID test that was negative. Please call  [phone]    Virtual/Telephone Consent    [facility] Charles verbally consents to a Virtual/Telephone Check-In service on 6/21/22. Patient understands and accepts financial responsibility for any deductible, co-insurance and/or co-pays associated with this service.

## 2022-06-21 ENCOUNTER — TELEMEDICINE (OUTPATIENT)
Dept: INTERNAL MEDICINE CLINIC | Facility: CLINIC | Age: 47
End: 2022-06-21

## 2022-06-21 ENCOUNTER — LAB ENCOUNTER (OUTPATIENT)
Dept: LAB | Age: 47
End: 2022-06-21
Attending: NURSE PRACTITIONER
Payer: COMMERCIAL

## 2022-06-21 VITALS — BODY MASS INDEX: 28 KG/M2 | TEMPERATURE: 97 F | HEIGHT: 67 IN

## 2022-06-21 DIAGNOSIS — B34.9 VIRAL SYNDROME: Primary | ICD-10-CM

## 2022-06-21 DIAGNOSIS — B34.9 VIRAL SYNDROME: ICD-10-CM

## 2022-06-21 LAB — SARS-COV-2 RNA RESP QL NAA+PROBE: DETECTED

## 2022-06-21 PROCEDURE — 99214 OFFICE O/P EST MOD 30 MIN: CPT | Performed by: NURSE PRACTITIONER

## 2022-06-22 ENCOUNTER — PATIENT MESSAGE (OUTPATIENT)
Dept: INTERNAL MEDICINE CLINIC | Facility: CLINIC | Age: 47
End: 2022-06-22

## 2022-06-22 NOTE — TELEPHONE ENCOUNTER
Please see pt message below to advise, he had VV yesterday. Do  You wish to have the pt have another video visit? I do not see the oral medication was discussed.    Thank you

## 2022-06-22 NOTE — TELEPHONE ENCOUNTER
From: Leela Capps  To: DEIRDRE Brown  Sent: 6/22/2022 10:03 AM CDT  Subject: Question regarding SARS-COV-2    With the positive test, can I get a prescription for Paxlovid today?

## 2022-06-22 NOTE — TELEPHONE ENCOUNTER
1. Viral syndrome  -Patient has positive exposure to COVID, has symptoms of acute viral syndrome without any labored breathing. -COVID test ordered. -Isolation and supportive care discussed  -To ER as needed for shortness of breath  -If positive would benefit from either Paxlovid or IV infusion. Both were discussed as options and provided information on treatment, side effects, to monitor for allergy  - SARS-COV-2 BY PCR ([facility]);  Future

## 2022-06-24 ENCOUNTER — APPOINTMENT (OUTPATIENT)
Dept: PHYSICAL THERAPY | Age: 47
End: 2022-06-24
Attending: FAMILY MEDICINE
Payer: COMMERCIAL

## 2022-09-26 ENCOUNTER — TELEPHONE (OUTPATIENT)
Dept: INTERNAL MEDICINE CLINIC | Facility: CLINIC | Age: 47
End: 2022-09-26

## 2022-09-26 ENCOUNTER — HOSPITAL ENCOUNTER (OUTPATIENT)
Age: 47
Discharge: HOME | End: 2022-09-26

## 2022-09-26 VITALS
RESPIRATION RATE: 16 BRPM | TEMPERATURE: 98 F | SYSTOLIC BLOOD PRESSURE: 128 MMHG | HEIGHT: 68 IN | WEIGHT: 185 LBS | DIASTOLIC BLOOD PRESSURE: 83 MMHG | BODY MASS INDEX: 28.04 KG/M2 | OXYGEN SATURATION: 96 % | HEART RATE: 79 BPM

## 2022-09-26 DIAGNOSIS — H65.01 NON-RECURRENT ACUTE SEROUS OTITIS MEDIA OF RIGHT EAR: Primary | ICD-10-CM

## 2022-09-26 PROCEDURE — 99213 OFFICE O/P EST LOW 20 MIN: CPT | Performed by: NURSE PRACTITIONER

## 2022-09-26 RX ORDER — AMOXICILLIN AND CLAVULANATE POTASSIUM 875; 125 MG/1; MG/1
1 TABLET, FILM COATED ORAL 2 TIMES DAILY
Qty: 14 TABLET | Refills: 0 | Status: SHIPPED | OUTPATIENT
Start: 2022-09-26 | End: 2022-10-03

## 2022-09-26 NOTE — TELEPHONE ENCOUNTER
Patient called requesting to make an appt for an ear infection. During the [facility] screening, he said he's had a sore throat for 1 day, and he believes that is due to allergies. He has not had a recent COVID test. PSR told him we would need to schedule a VV due to office protocol. He was not happy and refused to schedule. He mentioned going to immediate care.

## 2022-10-04 LAB
AMB EXT BILIRUBIN, TOTAL: 0.3 MG/DL
AMB EXT BUN: 17 MG/DL
AMB EXT CALCIUM: 9
AMB EXT CARBON DIOXIDE: 23
AMB EXT CHLORIDE: 104
AMB EXT CHOL/HDL RATIO: 3.4
AMB EXT CHOLESTEROL, TOTAL: 149 MG/DL
AMB EXT CMP ALT: 45 U/L
AMB EXT CMP AST: 27 U/L
AMB EXT CREATININE: 1.02 MG/DL
AMB EXT EGFR NON-AA: 90
AMB EXT GLUCOSE: 89 MG/DL
AMB EXT HDL CHOLESTEROL: 44 MG/DL
AMB EXT HEMATOCRIT: 45
AMB EXT HEMOGLOBIN: 14.7
AMB EXT LDL CHOLESTEROL, DIRECT: 83 MG/DL
AMB EXT MCV: 91
AMB EXT PLATELETS: 257
AMB EXT POSTASSIUM: 4.5 MMOL/L
AMB EXT PSA SCREEN: 0.6 NG/ML
AMB EXT SODIUM: 141 MMOL/L
AMB EXT TOTAL PROTEIN: 6.5
AMB EXT TRIGLYCERIDES: 121 MG/DL
AMB EXT WBC: 8 X10(3)UL

## 2022-10-07 ENCOUNTER — PATIENT MESSAGE (OUTPATIENT)
Dept: INTERNAL MEDICINE CLINIC | Facility: CLINIC | Age: 47
End: 2022-10-07

## 2022-10-07 DIAGNOSIS — Z13.29 SCREENING FOR THYROID DISORDER: ICD-10-CM

## 2022-10-07 DIAGNOSIS — R79.89 ELEVATED LFTS: Primary | ICD-10-CM

## 2022-10-10 NOTE — TELEPHONE ENCOUNTER
External records at the [facility] desk for review and in external records updated on epic

## 2022-10-11 NOTE — TELEPHONE ENCOUNTER
Labs reviewed. Kidney function is normal.  Electrolytes are good Sugar is good. ALT is off by one-point. Not really significant. He can avoid alcohol-only do moderation, do low-fat diet and continue exercising. We can repeat LFTs in 6 months just to monitor. Cholesterol is normal.  CBC, PSA are normal I do not see a TSH with reflex in here. This can be done when he has his LFTs in 6 months if he would like. His blood pressure was 4 points high but that is not extremely elevated. He can do lifestyle changes to lower blood pressure. He can follow-up in May when due and we will see how it is at that time. He does not need any medicine currently.

## 2022-10-15 ENCOUNTER — MED REC SCAN ONLY (OUTPATIENT)
Dept: INTERNAL MEDICINE CLINIC | Facility: CLINIC | Age: 47
End: 2022-10-15

## 2022-11-19 ENCOUNTER — OFFICE VISIT (OUTPATIENT)
Dept: FAMILY MEDICINE CLINIC | Facility: CLINIC | Age: 47
End: 2022-11-19
Payer: COMMERCIAL

## 2022-11-19 ENCOUNTER — TELEMEDICINE (OUTPATIENT)
Dept: TELEHEALTH | Age: 47
End: 2022-11-19
Payer: COMMERCIAL

## 2022-11-19 DIAGNOSIS — J01.40 ACUTE NON-RECURRENT PANSINUSITIS: Primary | ICD-10-CM

## 2022-11-19 DIAGNOSIS — Z02.9 ADMINISTRATIVE ENCOUNTER: Primary | ICD-10-CM

## 2022-11-19 PROCEDURE — 99213 OFFICE O/P EST LOW 20 MIN: CPT | Performed by: NURSE PRACTITIONER

## 2022-11-19 PROCEDURE — 3078F DIAST BP <80 MM HG: CPT | Performed by: NURSE PRACTITIONER

## 2022-11-19 PROCEDURE — 3074F SYST BP LT 130 MM HG: CPT | Performed by: NURSE PRACTITIONER

## 2022-11-19 RX ORDER — OSELTAMIVIR PHOSPHATE 75 MG/1
75 CAPSULE ORAL 2 TIMES DAILY
Qty: 10 CAPSULE | Refills: 0 | Status: SHIPPED | OUTPATIENT
Start: 2022-11-19 | End: 2022-11-20 | Stop reason: RX

## 2022-11-19 RX ORDER — AMOXICILLIN AND CLAVULANATE POTASSIUM 875; 125 MG/1; MG/1
1 TABLET, FILM COATED ORAL 2 TIMES DAILY
Qty: 20 TABLET | Refills: 0 | Status: SHIPPED | OUTPATIENT
Start: 2022-11-19 | End: 2022-11-20 | Stop reason: RX

## 2022-11-20 VITALS
TEMPERATURE: 98 F | RESPIRATION RATE: 16 BRPM | OXYGEN SATURATION: 98 % | DIASTOLIC BLOOD PRESSURE: 70 MMHG | HEART RATE: 80 BPM | SYSTOLIC BLOOD PRESSURE: 110 MMHG

## 2022-11-20 LAB
FLUAV + FLUBV RNA SPEC NAA+PROBE: NOT DETECTED
FLUAV + FLUBV RNA SPEC NAA+PROBE: NOT DETECTED
RSV RNA SPEC NAA+PROBE: NOT DETECTED
SARS-COV-2 RNA RESP QL NAA+PROBE: NOT DETECTED

## 2022-11-20 RX ORDER — AMOXICILLIN AND CLAVULANATE POTASSIUM 875; 125 MG/1; MG/1
1 TABLET, FILM COATED ORAL 2 TIMES DAILY
Qty: 20 TABLET | Refills: 0 | OUTPATIENT
Start: 2022-11-20 | End: 2022-11-30

## 2022-11-20 RX ORDER — OSELTAMIVIR PHOSPHATE 75 MG/1
75 CAPSULE ORAL 2 TIMES DAILY
Qty: 10 CAPSULE | Refills: 0 | OUTPATIENT
Start: 2022-11-20 | End: 2022-11-25

## 2022-11-21 NOTE — PROGRESS NOTES
Video Visit scheduled for ongoing sinusitis symptoms and new exposure to wife with Influenza A. Patient is requesting Influenza testing. No outpatient labs or Central Scheduling accessible at this time on a Saturday. Patient will come to the [facility] near his home to see me In Clinic and have Respiratory Panel sent. Video Visit cancelled with no charge.

## 2023-02-03 ENCOUNTER — OFFICE VISIT (OUTPATIENT)
Dept: INTERNAL MEDICINE CLINIC | Facility: CLINIC | Age: 48
End: 2023-02-03
Payer: COMMERCIAL

## 2023-02-03 VITALS
OXYGEN SATURATION: 98 % | SYSTOLIC BLOOD PRESSURE: 110 MMHG | RESPIRATION RATE: 14 BRPM | BODY MASS INDEX: 29.68 KG/M2 | TEMPERATURE: 98 F | WEIGHT: 195.81 LBS | HEART RATE: 78 BPM | HEIGHT: 68 IN | DIASTOLIC BLOOD PRESSURE: 80 MMHG

## 2023-02-03 DIAGNOSIS — H66.012 NON-RECURRENT ACUTE SUPPURATIVE OTITIS MEDIA OF LEFT EAR WITH SPONTANEOUS RUPTURE OF TYMPANIC MEMBRANE: Primary | ICD-10-CM

## 2023-02-03 PROCEDURE — 3074F SYST BP LT 130 MM HG: CPT | Performed by: NURSE PRACTITIONER

## 2023-02-03 PROCEDURE — 3079F DIAST BP 80-89 MM HG: CPT | Performed by: NURSE PRACTITIONER

## 2023-02-03 PROCEDURE — 99213 OFFICE O/P EST LOW 20 MIN: CPT | Performed by: NURSE PRACTITIONER

## 2023-02-03 PROCEDURE — 3008F BODY MASS INDEX DOCD: CPT | Performed by: NURSE PRACTITIONER

## 2023-02-03 RX ORDER — AMOXICILLIN AND CLAVULANATE POTASSIUM 875; 125 MG/1; MG/1
1 TABLET, FILM COATED ORAL 2 TIMES DAILY
Qty: 20 TABLET | Refills: 0 | Status: SHIPPED | OUTPATIENT
Start: 2023-02-03 | End: 2023-02-13

## 2023-02-03 NOTE — PATIENT INSTRUCTIONS
Take antibiotic completely as ordered     Take antibiotic with food    Eat yogurt twice a day while on antibiotic or take an oral probiotic    Monitor for diarrhea, side effects, allergic reaction    If you have a mild allergic reaction take benadryl and call the office.  If it is severe and you have lip or throat swelling or trouble breathing go to the ER or call 911    Follow up in ten days

## 2023-02-14 ENCOUNTER — OFFICE VISIT (OUTPATIENT)
Dept: INTERNAL MEDICINE CLINIC | Facility: CLINIC | Age: 48
End: 2023-02-14
Payer: COMMERCIAL

## 2023-02-14 VITALS
SYSTOLIC BLOOD PRESSURE: 110 MMHG | TEMPERATURE: 98 F | BODY MASS INDEX: 29.86 KG/M2 | RESPIRATION RATE: 20 BRPM | HEART RATE: 75 BPM | OXYGEN SATURATION: 99 % | WEIGHT: 197 LBS | HEIGHT: 68 IN | DIASTOLIC BLOOD PRESSURE: 70 MMHG

## 2023-02-14 DIAGNOSIS — H66.92 LEFT OTITIS MEDIA, UNSPECIFIED OTITIS MEDIA TYPE: Primary | ICD-10-CM

## 2023-02-14 PROCEDURE — 3008F BODY MASS INDEX DOCD: CPT | Performed by: NURSE PRACTITIONER

## 2023-02-14 PROCEDURE — 3078F DIAST BP <80 MM HG: CPT | Performed by: NURSE PRACTITIONER

## 2023-02-14 PROCEDURE — 99213 OFFICE O/P EST LOW 20 MIN: CPT | Performed by: NURSE PRACTITIONER

## 2023-02-14 PROCEDURE — 3074F SYST BP LT 130 MM HG: CPT | Performed by: NURSE PRACTITIONER

## 2023-02-20 ENCOUNTER — TELEPHONE (OUTPATIENT)
Dept: INTERNAL MEDICINE CLINIC | Facility: CLINIC | Age: 48
End: 2023-02-20

## 2023-02-20 NOTE — TELEPHONE ENCOUNTER
Last 2 OV were for otitis, please advise if appropriate to addend for pre-op or if patient needs pre-op appointment, thanks!

## 2023-02-20 NOTE — TELEPHONE ENCOUNTER
Angelica from [facility] surgery called and stated that patient is having  having procedure on right middle finger a1 pulley relief. . pt was just in for a OV on 02.14.23. . she is wanting to know can provider make an addendum to those notes or, does patient need to come in for a pre-op appt. . his procedure date is 03.09.23 and no testing or labs is required for this procedure. . please advise.      Angelica from [facility] surgery office [phone]

## 2023-02-20 NOTE — TELEPHONE ENCOUNTER
LM jeffrey Dominguez at [address] that addendum to last OV notes is not appropriate and patient will need pre-op apt.  please reach out to patient to schedule pre-op, thank you.

## 2023-02-20 NOTE — TELEPHONE ENCOUNTER
Incoming (mail or fax):  fax  Received from:  Dr Augusto Sanches office  Documentation given to:  Triage preop bin    PSR left message on patient's voicemail to call back to schedule preop.

## 2023-02-22 NOTE — PROGRESS NOTES
Dx: Wrist pain   Authorized # of Visits: 8 POC  20/yr (3 used) Next MD Visit: as needed   Fall Risk: Standard  Precautions: None      Subjective: LBP is better, not as intense/frequent symptoms, but still a low throb on the left side.  Wrist/forearm exercis Manual:  -Trigger point, right/left pronator teres x10 min  -STM, hawk  to right pronator teres x15 min         HEP: ulnar nerve glide, resisted pronation    Charges:  TherEx x1 (15 min), Manual x2 (25 min)       Total Timed Treatment: 40 min  Total Tr [Hoarseness] : hoarseness not clearly evaluated by indirect laryngoscopy [Flexible Endoscope] : examined with the flexible endoscope [True Vocal Cords Erythematous] : bilateral true vocal cord edema [Arytenoid Cartilages Bilateral] : bilateral arytenoid ulceration [Arytenoid Erythema ___ /4] : arytenoid erythema [unfilled]U/4 [Interarytenoid Edema] : interarytenoid area edematous [de-identified] : cobblestoning of tvc, with severe erythema

## 2023-02-23 NOTE — TELEPHONE ENCOUNTER
Surgery 3/9/23    Future Appointments   Date Time Provider Magno Jules   3/2/2023  8:00 AM Daysi Miguel, DEIRDRE [facility] [address] [facility] [address] [facility] Poisson

## 2023-03-02 ENCOUNTER — OFFICE VISIT (OUTPATIENT)
Dept: INTERNAL MEDICINE CLINIC | Facility: CLINIC | Age: 48
End: 2023-03-02
Payer: COMMERCIAL

## 2023-03-02 VITALS
TEMPERATURE: 97 F | HEART RATE: 76 BPM | HEIGHT: 68 IN | RESPIRATION RATE: 14 BRPM | BODY MASS INDEX: 29.3 KG/M2 | WEIGHT: 193.31 LBS | SYSTOLIC BLOOD PRESSURE: 104 MMHG | OXYGEN SATURATION: 98 % | DIASTOLIC BLOOD PRESSURE: 70 MMHG

## 2023-03-02 DIAGNOSIS — Z01.818 PREOP EXAM FOR INTERNAL MEDICINE: Primary | ICD-10-CM

## 2023-03-02 DIAGNOSIS — J30.1 ALLERGIC RHINITIS DUE TO POLLEN, UNSPECIFIED SEASONALITY: ICD-10-CM

## 2023-03-02 DIAGNOSIS — M65.331 TRIGGER MIDDLE FINGER OF RIGHT HAND: ICD-10-CM

## 2023-03-02 DIAGNOSIS — G47.33 OSA (OBSTRUCTIVE SLEEP APNEA): ICD-10-CM

## 2023-03-02 DIAGNOSIS — M65.322 TRIGGER INDEX FINGER OF LEFT HAND: ICD-10-CM

## 2023-03-02 PROBLEM — M77.11 LATERAL EPICONDYLITIS OF RIGHT ELBOW: Status: ACTIVE | Noted: 2022-09-12

## 2023-03-02 PROCEDURE — 99243 OFF/OP CNSLTJ NEW/EST LOW 30: CPT | Performed by: NURSE PRACTITIONER

## 2023-03-02 PROCEDURE — 3008F BODY MASS INDEX DOCD: CPT | Performed by: NURSE PRACTITIONER

## 2023-03-02 PROCEDURE — 3074F SYST BP LT 130 MM HG: CPT | Performed by: NURSE PRACTITIONER

## 2023-03-02 PROCEDURE — 3078F DIAST BP <80 MM HG: CPT | Performed by: NURSE PRACTITIONER

## 2023-03-08 ENCOUNTER — MED REC SCAN ONLY (OUTPATIENT)
Dept: INTERNAL MEDICINE CLINIC | Facility: CLINIC | Age: 48
End: 2023-03-08

## 2023-04-03 ENCOUNTER — APPOINTMENT (OUTPATIENT)
Dept: GENERAL RADIOLOGY | Facility: HOSPITAL | Age: 48
End: 2023-04-03
Attending: EMERGENCY MEDICINE
Payer: COMMERCIAL

## 2023-04-03 ENCOUNTER — HOSPITAL ENCOUNTER (EMERGENCY)
Facility: HOSPITAL | Age: 48
Discharge: HOME | End: 2023-04-03
Attending: EMERGENCY MEDICINE
Payer: COMMERCIAL

## 2023-04-03 VITALS
HEART RATE: 70 BPM | BODY MASS INDEX: 29 KG/M2 | DIASTOLIC BLOOD PRESSURE: 88 MMHG | SYSTOLIC BLOOD PRESSURE: 127 MMHG | TEMPERATURE: 97 F | WEIGHT: 193.31 LBS | RESPIRATION RATE: 18 BRPM | OXYGEN SATURATION: 96 %

## 2023-04-03 DIAGNOSIS — R00.2 PALPITATIONS: Primary | ICD-10-CM

## 2023-04-03 LAB
ALBUMIN SERPL-MCNC: 3.6 G/DL (ref 3.4–5)
ALBUMIN/GLOB SERPL: 1.1 {RATIO} (ref 1–2)
ALP LIVER SERPL-CCNC: 105 U/L
ALT SERPL-CCNC: 50 U/L
ANION GAP SERPL CALC-SCNC: 5 MMOL/L (ref 0–18)
APTT PPP: 34 SECONDS (ref 23.3–35.6)
AST SERPL-CCNC: 30 U/L (ref 15–37)
ATRIAL RATE: 76 BPM
BASOPHILS # BLD AUTO: 0.07 X10(3) UL (ref 0–0.2)
BASOPHILS NFR BLD AUTO: 0.7 %
BILIRUB SERPL-MCNC: 0.4 MG/DL (ref 0.1–2)
BUN BLD-MCNC: 23 MG/DL (ref 7–18)
CALCIUM BLD-MCNC: 9 MG/DL (ref 8.5–10.1)
CHLORIDE SERPL-SCNC: 109 MMOL/L (ref 98–112)
CO2 SERPL-SCNC: 25 MMOL/L (ref 21–32)
CREAT BLD-MCNC: 0.92 MG/DL
EOSINOPHIL # BLD AUTO: 0.26 X10(3) UL (ref 0–0.7)
EOSINOPHIL NFR BLD AUTO: 2.6 %
ERYTHROCYTE [DISTWIDTH] IN BLOOD BY AUTOMATED COUNT: 12.7 %
GFR SERPLBLD BASED ON 1.73 SQ M-ARVRAT: 102 ML/MIN/1.73M2 (ref 60–?)
GLOBULIN PLAS-MCNC: 3.4 G/DL (ref 2.8–4.4)
GLUCOSE BLD-MCNC: 114 MG/DL (ref 70–99)
HCT VFR BLD AUTO: 41.8 %
HGB BLD-MCNC: 14.6 G/DL
IMM GRANULOCYTES # BLD AUTO: 0.08 X10(3) UL (ref 0–1)
IMM GRANULOCYTES NFR BLD: 0.8 %
INR BLD: 0.98 (ref 0.85–1.16)
LYMPHOCYTES # BLD AUTO: 2.47 X10(3) UL (ref 1–4)
LYMPHOCYTES NFR BLD AUTO: 24.3 %
MCH RBC QN AUTO: 30.2 PG (ref 26–34)
MCHC RBC AUTO-ENTMCNC: 34.9 G/DL (ref 31–37)
MCV RBC AUTO: 86.5 FL
MONOCYTES # BLD AUTO: 0.95 X10(3) UL (ref 0.1–1)
MONOCYTES NFR BLD AUTO: 9.4 %
NEUTROPHILS # BLD AUTO: 6.32 X10 (3) UL (ref 1.5–7.7)
NEUTROPHILS # BLD AUTO: 6.32 X10(3) UL (ref 1.5–7.7)
NEUTROPHILS NFR BLD AUTO: 62.2 %
OSMOLALITY SERPL CALC.SUM OF ELEC: 293 MOSM/KG (ref 275–295)
P AXIS: 30 DEGREES
P-R INTERVAL: 158 MS
PLATELET # BLD AUTO: 244 10(3)UL (ref 150–450)
POTASSIUM SERPL-SCNC: 3.8 MMOL/L (ref 3.5–5.1)
PROT SERPL-MCNC: 7 G/DL (ref 6.4–8.2)
PROTHROMBIN TIME: 13 SECONDS (ref 11.6–14.8)
Q-T INTERVAL: 374 MS
QRS DURATION: 78 MS
QTC CALCULATION (BEZET): 420 MS
R AXIS: 31 DEGREES
RBC # BLD AUTO: 4.83 X10(6)UL
SARS-COV-2 RNA RESP QL NAA+PROBE: NOT DETECTED
SODIUM SERPL-SCNC: 139 MMOL/L (ref 136–145)
T AXIS: 10 DEGREES
TROPONIN I HIGH SENSITIVITY: 3 NG/L
VENTRICULAR RATE: 76 BPM
WBC # BLD AUTO: 10.2 X10(3) UL (ref 4–11)

## 2023-04-03 PROCEDURE — 84484 ASSAY OF TROPONIN QUANT: CPT | Performed by: EMERGENCY MEDICINE

## 2023-04-03 PROCEDURE — 85610 PROTHROMBIN TIME: CPT | Performed by: EMERGENCY MEDICINE

## 2023-04-03 PROCEDURE — 96361 HYDRATE IV INFUSION ADD-ON: CPT

## 2023-04-03 PROCEDURE — 96360 HYDRATION IV INFUSION INIT: CPT

## 2023-04-03 PROCEDURE — 85025 COMPLETE CBC W/AUTO DIFF WBC: CPT | Performed by: EMERGENCY MEDICINE

## 2023-04-03 PROCEDURE — 85730 THROMBOPLASTIN TIME PARTIAL: CPT | Performed by: EMERGENCY MEDICINE

## 2023-04-03 PROCEDURE — 99285 EMERGENCY DEPT VISIT HI MDM: CPT

## 2023-04-03 PROCEDURE — 93005 ELECTROCARDIOGRAM TRACING: CPT

## 2023-04-03 PROCEDURE — 93010 ELECTROCARDIOGRAM REPORT: CPT

## 2023-04-03 PROCEDURE — 71045 X-RAY EXAM CHEST 1 VIEW: CPT | Performed by: EMERGENCY MEDICINE

## 2023-04-03 PROCEDURE — 80053 COMPREHEN METABOLIC PANEL: CPT | Performed by: EMERGENCY MEDICINE

## 2023-04-04 ENCOUNTER — OFFICE VISIT (OUTPATIENT)
Dept: INTERNAL MEDICINE CLINIC | Facility: CLINIC | Age: 48
End: 2023-04-04
Payer: COMMERCIAL

## 2023-04-04 VITALS
HEART RATE: 78 BPM | TEMPERATURE: 98 F | BODY MASS INDEX: 29.7 KG/M2 | WEIGHT: 196 LBS | HEIGHT: 68 IN | OXYGEN SATURATION: 98 % | SYSTOLIC BLOOD PRESSURE: 126 MMHG | DIASTOLIC BLOOD PRESSURE: 74 MMHG | RESPIRATION RATE: 18 BRPM

## 2023-04-04 DIAGNOSIS — R00.2 PALPITATIONS: Primary | ICD-10-CM

## 2023-04-04 PROCEDURE — 3008F BODY MASS INDEX DOCD: CPT | Performed by: NURSE PRACTITIONER

## 2023-04-04 PROCEDURE — 99214 OFFICE O/P EST MOD 30 MIN: CPT | Performed by: NURSE PRACTITIONER

## 2023-04-04 PROCEDURE — 3078F DIAST BP <80 MM HG: CPT | Performed by: NURSE PRACTITIONER

## 2023-04-04 PROCEDURE — 3074F SYST BP LT 130 MM HG: CPT | Performed by: NURSE PRACTITIONER

## 2023-04-10 ENCOUNTER — HOSPITAL ENCOUNTER (OUTPATIENT)
Dept: CV DIAGNOSTICS | Facility: HOSPITAL | Age: 48
Discharge: HOME | End: 2023-04-10
Attending: NURSE PRACTITIONER
Payer: COMMERCIAL

## 2023-04-10 DIAGNOSIS — R00.2 PALPITATIONS: ICD-10-CM

## 2023-04-10 PROCEDURE — 93306 TTE W/DOPPLER COMPLETE: CPT | Performed by: NURSE PRACTITIONER

## 2023-04-10 PROCEDURE — 93226 XTRNL ECG REC<48 HR SCAN A/R: CPT | Performed by: NURSE PRACTITIONER

## 2023-04-10 PROCEDURE — 93225 XTRNL ECG REC<48 HRS REC: CPT | Performed by: NURSE PRACTITIONER

## 2023-04-10 PROCEDURE — 93227 XTRNL ECG REC<48 HR R&I: CPT | Performed by: NURSE PRACTITIONER

## 2023-04-25 ENCOUNTER — TELEPHONE (OUTPATIENT)
Dept: OCCUPATIONAL MEDICINE | Facility: HOSPITAL | Age: 48
End: 2023-04-25

## 2023-04-25 ENCOUNTER — ORDER TRANSCRIPTION (OUTPATIENT)
Dept: PHYSICAL THERAPY | Facility: HOSPITAL | Age: 48
End: 2023-04-25

## 2023-04-25 DIAGNOSIS — Z98.890 S/P TRIGGER FINGER RELEASE: Primary | ICD-10-CM

## 2023-04-25 DIAGNOSIS — Z98.890 POSTOPERATIVE SCAR: ICD-10-CM

## 2023-04-25 DIAGNOSIS — L90.5 POSTOPERATIVE SCAR: ICD-10-CM

## 2023-04-26 ENCOUNTER — ORDER TRANSCRIPTION (OUTPATIENT)
Dept: PHYSICAL THERAPY | Facility: HOSPITAL | Age: 48
End: 2023-04-26

## 2023-04-26 ENCOUNTER — PATIENT MESSAGE (OUTPATIENT)
Dept: PHYSICAL THERAPY | Facility: HOSPITAL | Age: 48
End: 2023-04-26

## 2023-04-26 DIAGNOSIS — L90.5 POSTOPERATIVE SCAR: ICD-10-CM

## 2023-04-26 DIAGNOSIS — Z98.890 S/P TRIGGER FINGER RELEASE: Primary | ICD-10-CM

## 2023-04-26 DIAGNOSIS — Z98.890 POSTOPERATIVE SCAR: ICD-10-CM

## 2023-05-04 ENCOUNTER — TELEPHONE (OUTPATIENT)
Dept: PHYSICAL THERAPY | Facility: HOSPITAL | Age: 48
End: 2023-05-04

## 2023-05-09 ENCOUNTER — OFFICE VISIT (OUTPATIENT)
Dept: PHYSICAL THERAPY | Age: 48
End: 2023-05-09
Attending: ORTHOPAEDIC SURGERY
Payer: COMMERCIAL

## 2023-05-09 DIAGNOSIS — L90.5 POSTOPERATIVE SCAR: ICD-10-CM

## 2023-05-09 DIAGNOSIS — Z98.890 S/P TRIGGER FINGER RELEASE: ICD-10-CM

## 2023-05-09 DIAGNOSIS — Z98.890 POSTOPERATIVE SCAR: ICD-10-CM

## 2023-05-09 PROCEDURE — 97161 PT EVAL LOW COMPLEX 20 MIN: CPT

## 2023-05-09 PROCEDURE — 97110 THERAPEUTIC EXERCISES: CPT

## 2023-05-10 NOTE — PATIENT INSTRUCTIONS
Home Exercise:   To be performed 2-3 times per day:  Finger mobilization   around middle finger knuckle closest to your hand (same  as pictured below, but closer to hand)    Use your right hand to glide the finger joint back and forth  As the mobility increases you can move the finger from a bent position to a straight position  Following the mobilization, place palm flat on table, tap middle finger up and down from table, 10-15 reps

## 2023-05-11 ENCOUNTER — APPOINTMENT (OUTPATIENT)
Dept: PHYSICAL THERAPY | Age: 48
End: 2023-05-11
Attending: ORTHOPAEDIC SURGERY
Payer: COMMERCIAL

## 2023-05-15 ENCOUNTER — OFFICE VISIT (OUTPATIENT)
Dept: PHYSICAL THERAPY | Age: 48
End: 2023-05-15
Attending: ORTHOPAEDIC SURGERY
Payer: COMMERCIAL

## 2023-05-15 PROCEDURE — 97110 THERAPEUTIC EXERCISES: CPT

## 2023-05-15 PROCEDURE — 97140 MANUAL THERAPY 1/> REGIONS: CPT

## 2023-05-15 NOTE — PATIENT INSTRUCTIONS
Home Exercise:   To be performed 2-3 times per day:  Continue right middle finger mobilization  Isometric right middle finger flexion, 15 reps, 10 sec hold  Left hand tendon glide (picture below): start with position 1, 2, and 3 if done without symptoms

## 2023-05-17 ENCOUNTER — OFFICE VISIT (OUTPATIENT)
Dept: PHYSICAL THERAPY | Age: 48
End: 2023-05-17
Attending: ORTHOPAEDIC SURGERY
Payer: COMMERCIAL

## 2023-05-17 PROCEDURE — 97110 THERAPEUTIC EXERCISES: CPT

## 2023-05-17 PROCEDURE — 97140 MANUAL THERAPY 1/> REGIONS: CPT

## 2023-05-17 NOTE — PATIENT INSTRUCTIONS
Right Hand:  Tendon glides  Self massage    Left Hand:  Isometric finger flexion  Self resistance  Light gripping with towel    Nerve glider exercise for both arms

## 2023-05-22 ENCOUNTER — HOSPITAL ENCOUNTER (OUTPATIENT)
Dept: CT IMAGING | Age: 48
Discharge: HOME | End: 2023-05-22
Attending: INTERNAL MEDICINE

## 2023-05-22 DIAGNOSIS — Z13.9 ENCOUNTER FOR SCREENING: ICD-10-CM

## 2023-05-23 ENCOUNTER — APPOINTMENT (OUTPATIENT)
Dept: PHYSICAL THERAPY | Age: 48
End: 2023-05-23
Attending: ORTHOPAEDIC SURGERY
Payer: COMMERCIAL

## 2023-05-25 ENCOUNTER — TELEPHONE (OUTPATIENT)
Dept: PHYSICAL THERAPY | Facility: HOSPITAL | Age: 48
End: 2023-05-25

## 2023-05-25 ENCOUNTER — APPOINTMENT (OUTPATIENT)
Dept: PHYSICAL THERAPY | Age: 48
End: 2023-05-25
Attending: ORTHOPAEDIC SURGERY
Payer: COMMERCIAL

## 2023-05-31 ENCOUNTER — OFFICE VISIT (OUTPATIENT)
Dept: PHYSICAL THERAPY | Age: 48
End: 2023-05-31
Attending: ORTHOPAEDIC SURGERY
Payer: COMMERCIAL

## 2023-05-31 PROCEDURE — 97140 MANUAL THERAPY 1/> REGIONS: CPT

## 2023-05-31 PROCEDURE — 97110 THERAPEUTIC EXERCISES: CPT

## 2023-06-02 ENCOUNTER — APPOINTMENT (OUTPATIENT)
Dept: PHYSICAL THERAPY | Age: 48
End: 2023-06-02
Attending: ORTHOPAEDIC SURGERY
Payer: COMMERCIAL

## 2023-06-02 ENCOUNTER — OFFICE VISIT (OUTPATIENT)
Dept: PHYSICAL THERAPY | Age: 48
End: 2023-06-02
Attending: ORTHOPAEDIC SURGERY
Payer: COMMERCIAL

## 2023-06-02 PROCEDURE — 97110 THERAPEUTIC EXERCISES: CPT

## 2023-06-02 PROCEDURE — 97140 MANUAL THERAPY 1/> REGIONS: CPT

## 2023-06-06 ENCOUNTER — OFFICE VISIT (OUTPATIENT)
Dept: PHYSICAL THERAPY | Age: 48
End: 2023-06-06
Attending: ORTHOPAEDIC SURGERY
Payer: COMMERCIAL

## 2023-06-06 PROCEDURE — 97110 THERAPEUTIC EXERCISES: CPT

## 2023-06-06 PROCEDURE — 97140 MANUAL THERAPY 1/> REGIONS: CPT

## 2023-06-08 ENCOUNTER — APPOINTMENT (OUTPATIENT)
Dept: PHYSICAL THERAPY | Age: 48
End: 2023-06-08
Attending: ORTHOPAEDIC SURGERY
Payer: COMMERCIAL

## 2023-06-09 ENCOUNTER — OFFICE VISIT (OUTPATIENT)
Dept: INTERNAL MEDICINE CLINIC | Facility: CLINIC | Age: 48
End: 2023-06-09
Payer: COMMERCIAL

## 2023-06-09 VITALS
SYSTOLIC BLOOD PRESSURE: 102 MMHG | OXYGEN SATURATION: 98 % | WEIGHT: 196 LBS | HEART RATE: 97 BPM | TEMPERATURE: 98 F | BODY MASS INDEX: 29.7 KG/M2 | HEIGHT: 68 IN | RESPIRATION RATE: 20 BRPM | DIASTOLIC BLOOD PRESSURE: 70 MMHG

## 2023-06-09 DIAGNOSIS — Z13.220 SCREENING FOR CHOLESTEROL LEVEL: ICD-10-CM

## 2023-06-09 DIAGNOSIS — Z13.29 SCREENING FOR THYROID DISORDER: ICD-10-CM

## 2023-06-09 DIAGNOSIS — Z12.11 SCREENING FOR COLON CANCER: ICD-10-CM

## 2023-06-09 DIAGNOSIS — Z00.00 ENCOUNTER FOR ANNUAL PHYSICAL EXAM: Primary | ICD-10-CM

## 2023-06-09 DIAGNOSIS — Z12.5 SCREENING FOR PROSTATE CANCER: ICD-10-CM

## 2023-06-09 PROCEDURE — 3078F DIAST BP <80 MM HG: CPT | Performed by: NURSE PRACTITIONER

## 2023-06-09 PROCEDURE — 3074F SYST BP LT 130 MM HG: CPT | Performed by: NURSE PRACTITIONER

## 2023-06-09 PROCEDURE — 99396 PREV VISIT EST AGE 40-64: CPT | Performed by: NURSE PRACTITIONER

## 2023-06-09 PROCEDURE — 3008F BODY MASS INDEX DOCD: CPT | Performed by: NURSE PRACTITIONER

## 2023-06-13 ENCOUNTER — OFFICE VISIT (OUTPATIENT)
Dept: PHYSICAL THERAPY | Age: 48
End: 2023-06-13
Attending: ORTHOPAEDIC SURGERY
Payer: COMMERCIAL

## 2023-06-13 PROCEDURE — 97110 THERAPEUTIC EXERCISES: CPT

## 2023-06-13 PROCEDURE — 97140 MANUAL THERAPY 1/> REGIONS: CPT

## 2023-06-15 ENCOUNTER — APPOINTMENT (OUTPATIENT)
Dept: PHYSICAL THERAPY | Age: 48
End: 2023-06-15
Attending: ORTHOPAEDIC SURGERY
Payer: COMMERCIAL

## 2023-06-16 ENCOUNTER — MED REC SCAN ONLY (OUTPATIENT)
Dept: INTERNAL MEDICINE CLINIC | Facility: CLINIC | Age: 48
End: 2023-06-16

## 2023-06-27 ENCOUNTER — OFFICE VISIT (OUTPATIENT)
Dept: PHYSICAL THERAPY | Age: 48
End: 2023-06-27
Attending: ORTHOPAEDIC SURGERY
Payer: COMMERCIAL

## 2023-06-27 PROCEDURE — 97110 THERAPEUTIC EXERCISES: CPT

## 2023-06-29 ENCOUNTER — APPOINTMENT (OUTPATIENT)
Dept: PHYSICAL THERAPY | Age: 48
End: 2023-06-29
Attending: ORTHOPAEDIC SURGERY
Payer: COMMERCIAL

## 2023-07-14 ENCOUNTER — APPOINTMENT (OUTPATIENT)
Dept: PHYSICAL THERAPY | Age: 48
End: 2023-07-14
Attending: ORTHOPAEDIC SURGERY
Payer: COMMERCIAL

## 2023-07-28 ENCOUNTER — PATIENT MESSAGE (OUTPATIENT)
Dept: INTERNAL MEDICINE CLINIC | Facility: CLINIC | Age: 48
End: 2023-07-28

## 2023-07-28 NOTE — TELEPHONE ENCOUNTER
From: Alan De  To: DEIRDRE Bill  Sent: 7/28/2023 6:11 AM CDT  Subject: Hemorrhoid    Yesterday, I discovered an external hemorrhoid that is swollen and quite painful. What is the best was to treat this to try to resolve it as soon as possible? I am flying to [facility] for a 20th anniversary trip on Thursday (8/3) and really do not want to be dealing with this. I purchased Tucks pads and ointment (with lidocaine) last night and have started using them. Is there anything else I can do to speed the healing and reduce the swelling?

## 2023-07-28 NOTE — TELEPHONE ENCOUNTER
If the Tucks pads has hemorrhoid medication for anti-inflammation like hydrocortisone and that is perfect and he should keep using that. If it does not he can  a hemorrhoidal cream that has hydrocortisone as an ingredient and use both, He can also apply ice and avoid pressure with not sitting down directly on them. He can roll up a large towel or small blanket and turn it into a U shape with the opening toward his bottom. This will allow him to sit and not put pressure on it all the way. If he is still having issues next week before he leaves for vacation he can come to see me. He should have an appointment on the books just in case and then if he gets better he can cancel it. Thank you.

## 2023-07-31 NOTE — TELEPHONE ENCOUNTER
Please advise, thanks!     Future Appointments   Date Time Provider Magno Jules   7/31/2023  4:40 PM Earnestine Miguel, DEIRDRE [facility] [address] [facility] [address] [facility]via Gaston

## 2023-07-31 NOTE — TELEPHONE ENCOUNTER
Its up to him if he still wants to come. If they are improving I would not do anything different and tell him to continue current treatment. There are internal suppositories I can prescribe but only if needed. I would not know that without an exam.    I do not prefer the doughnut. It can press on them more. I do prefer the U shape. He can bring that on the plane. Thanks.

## 2023-08-02 ENCOUNTER — TELEPHONE (OUTPATIENT)
Dept: INTERNAL MEDICINE CLINIC | Facility: CLINIC | Age: 48
End: 2023-08-02

## 2023-08-02 NOTE — TELEPHONE ENCOUNTER
Date of pre-op appt:  9/29/23   Provider pre-op scheduled with: Nelly Betancur   Surgeon's name:  Dr Anastasiya Hudson   Phone #:  [phone]   Fax #:  [phone]  Surgery date:  10/12/23  Procedure/diagnosis:  left pulley release     Fax for preop sent  Given to Denise Tyler

## 2023-09-29 ENCOUNTER — OFFICE VISIT (OUTPATIENT)
Dept: INTERNAL MEDICINE CLINIC | Facility: CLINIC | Age: 48
End: 2023-09-29
Payer: COMMERCIAL

## 2023-09-29 VITALS
OXYGEN SATURATION: 98 % | SYSTOLIC BLOOD PRESSURE: 120 MMHG | RESPIRATION RATE: 16 BRPM | HEART RATE: 70 BPM | DIASTOLIC BLOOD PRESSURE: 70 MMHG | BODY MASS INDEX: 29.52 KG/M2 | WEIGHT: 194.81 LBS | TEMPERATURE: 97 F | HEIGHT: 68 IN

## 2023-09-29 DIAGNOSIS — G47.33 OSA (OBSTRUCTIVE SLEEP APNEA): ICD-10-CM

## 2023-09-29 DIAGNOSIS — Z01.818 PREOPERATIVE CLEARANCE: Primary | ICD-10-CM

## 2023-09-29 DIAGNOSIS — K76.0 FATTY LIVER: ICD-10-CM

## 2023-09-29 DIAGNOSIS — M65.322 TRIGGER INDEX FINGER OF LEFT HAND: ICD-10-CM

## 2023-09-29 DIAGNOSIS — G56.03 BILATERAL CARPAL TUNNEL SYNDROME: ICD-10-CM

## 2023-09-29 DIAGNOSIS — J30.1 ALLERGIC RHINITIS DUE TO POLLEN, UNSPECIFIED SEASONALITY: ICD-10-CM

## 2023-09-29 PROCEDURE — 3078F DIAST BP <80 MM HG: CPT | Performed by: NURSE PRACTITIONER

## 2023-09-29 PROCEDURE — 3074F SYST BP LT 130 MM HG: CPT | Performed by: NURSE PRACTITIONER

## 2023-09-29 PROCEDURE — 99243 OFF/OP CNSLTJ NEW/EST LOW 30: CPT | Performed by: NURSE PRACTITIONER

## 2023-09-29 PROCEDURE — 3008F BODY MASS INDEX DOCD: CPT | Performed by: NURSE PRACTITIONER

## 2023-10-11 ENCOUNTER — ORDER TRANSCRIPTION (OUTPATIENT)
Dept: PHYSICAL THERAPY | Facility: HOSPITAL | Age: 48
End: 2023-10-11

## 2023-10-11 DIAGNOSIS — L90.5 POSTOPERATIVE SCAR: ICD-10-CM

## 2023-10-11 DIAGNOSIS — Z98.890 STATUS POST TRIGGER FINGER RELEASE: Primary | ICD-10-CM

## 2023-10-11 DIAGNOSIS — Z98.890 POSTOPERATIVE SCAR: ICD-10-CM

## 2023-10-18 ENCOUNTER — TELEPHONE (OUTPATIENT)
Dept: PHYSICAL THERAPY | Facility: HOSPITAL | Age: 48
End: 2023-10-18

## 2023-10-19 ENCOUNTER — TELEPHONE (OUTPATIENT)
Dept: PHYSICAL THERAPY | Facility: HOSPITAL | Age: 48
End: 2023-10-19

## 2023-10-20 ENCOUNTER — OFFICE VISIT (OUTPATIENT)
Dept: PHYSICAL THERAPY | Age: 48
End: 2023-10-20
Attending: ORTHOPAEDIC SURGERY
Payer: COMMERCIAL

## 2023-10-20 DIAGNOSIS — L90.5 POSTOPERATIVE SCAR: ICD-10-CM

## 2023-10-20 DIAGNOSIS — Z98.890 STATUS POST TRIGGER FINGER RELEASE: Primary | ICD-10-CM

## 2023-10-20 DIAGNOSIS — Z98.890 POSTOPERATIVE SCAR: ICD-10-CM

## 2023-10-20 PROCEDURE — 97110 THERAPEUTIC EXERCISES: CPT

## 2023-10-20 PROCEDURE — 97161 PT EVAL LOW COMPLEX 20 MIN: CPT

## 2023-10-21 LAB
AMB EXT BILIRUBIN, TOTAL: 0.4 MG/DL
AMB EXT BUN: 13 MG/DL
AMB EXT CALCIUM: 9.3
AMB EXT CHLORIDE: 104
AMB EXT CHOLESTEROL, TOTAL: 160 MG/DL
AMB EXT CMP ALT: 51 U/L
AMB EXT CMP AST: 27 U/L
AMB EXT CREATININE: 0.94 MG/DL
AMB EXT EGFR NON-AA: 99
AMB EXT GLUCOSE: 84 MG/DL
AMB EXT HDL CHOLESTEROL: 44 MG/DL
AMB EXT HEMATOCRIT: 43.9
AMB EXT HEMOGLOBIN: 15.2
AMB EXT HGBA1C: 5.7 %
AMB EXT LDL CHOLESTEROL, DIRECT: 94 MG/DL
AMB EXT MCV: 88
AMB EXT PLATELETS: 264
AMB EXT POSTASSIUM: 4.3 MMOL/L
AMB EXT PSA SCREEN: 0.5 NG/ML
AMB EXT SODIUM: 140 MMOL/L
AMB EXT TOTAL PROTEIN: 6.7
AMB EXT TRIGLYCERIDES: 121 MG/DL
AMB EXT TSH: 1.5 MIU/ML
AMB EXT VITAMIN D, 25-HYDROXY: 12.8
AMB EXT WBC: 8.4 X10(3)UL

## 2023-10-23 ENCOUNTER — TELEPHONE (OUTPATIENT)
Dept: INTERNAL MEDICINE CLINIC | Facility: CLINIC | Age: 48
End: 2023-10-23

## 2023-10-23 DIAGNOSIS — R74.8 LIVER ENZYME ELEVATION: ICD-10-CM

## 2023-10-23 DIAGNOSIS — E55.9 VITAMIN D DEFICIENCY, UNSPECIFIED: Primary | ICD-10-CM

## 2023-10-23 NOTE — TELEPHONE ENCOUNTER
Incoming (mail or fax):  fax  [facility]  triage results bin    Lab resultsI

## 2023-10-23 NOTE — TELEPHONE ENCOUNTER
One liver enzyme mildly elevated. Has a history of fatty liver. Eat a low fat low carb diet. Exercise regularly. Avoid tylenol, other acetaminophen containing meds, alcohol. Recheck hfp in 1 month. Vit d is low take ergocalciferol 70359 units once a week for 12 weeks. Recheck a week after finishing the last dose. A1c pre diabetic. Work on low [employer] and regular exercise. Please inform pt. Labs sent for scanning.

## 2023-10-24 ENCOUNTER — TELEPHONE (OUTPATIENT)
Dept: PHYSICAL THERAPY | Facility: HOSPITAL | Age: 48
End: 2023-10-24

## 2023-10-24 RX ORDER — ERGOCALCIFEROL 1.25 MG/1
50000 CAPSULE ORAL WEEKLY
Qty: 12 CAPSULE | Refills: 0 | Status: SHIPPED | OUTPATIENT
Start: 2023-10-24

## 2023-10-24 NOTE — TELEPHONE ENCOUNTER
Called patient. Patient aware of providers response and rec's below. Patient verbalizes understanding of result. No additional questions.  Resources sent to patient via mail regarding low fat low carb diet per patients request.

## 2023-10-25 ENCOUNTER — APPOINTMENT (OUTPATIENT)
Dept: PHYSICAL THERAPY | Age: 48
End: 2023-10-25
Attending: INTERNAL MEDICINE
Payer: COMMERCIAL

## 2023-11-03 ENCOUNTER — OFFICE VISIT (OUTPATIENT)
Dept: PHYSICAL THERAPY | Age: 48
End: 2023-11-03
Attending: ORTHOPAEDIC SURGERY
Payer: COMMERCIAL

## 2023-11-03 PROCEDURE — 97140 MANUAL THERAPY 1/> REGIONS: CPT

## 2023-11-03 PROCEDURE — 97110 THERAPEUTIC EXERCISES: CPT

## 2023-11-03 NOTE — PROGRESS NOTES
Diagnosis:   S/p left index finger A1 pulley release on 10/12/23   Referring Provider: Phoenix Das  Date of Evaluation:    10/20/2023    Precautions:  None Next MD visit: as needed  Date of Surgery: n/a   Insurance Primary/Secondary: [employer] / N/A     # Auth Visits: 8 POC     Subjective: Had follow-up with surgeon, pleased with progress. Incision still painful, motion still feels stiff. Hands are stiff in AM  Current Pain: 0/10    Objective:   AROM left HAND:    IF   MP 0 - 50 (65)   PIP 0 - 90   DIP 0 - 55 (55)     Assessment: Improved left index finger mobility following scar massage and 2nd digit mobilization. Goals: (to be met in 8 visits)   Pt will improve L  strength to >30 lbs to be able to open a jar without difficulty   Pt will demonstrate improved be able to carry >10 # pain free   Pt will improve left index finger PIP flexion to >/= 80 deg  Pt will be independent and compliant with comprehensive HEP to maintain progress achieved in PT    Plan: Continue PT. Date:   11/3/2023  TX#: 2 Date:  TX#: 3 Date:  TX#: 4 Date:  TX#: 5 Date:  TX#: 6 Date:  TX#: 7 Date:  TX#: 8   TherEx:  -Left 2nd digit PIP, DIP towel  x20 reps  -Left 2nd digit flexion OP x20 reps  -Towel gripping x20 reps  -Right 3rd digit extension with MP PA overpressure x20 reps         Manual:  -2nd digit scar mobilization x15 min  -2nd digit MP, PIP, DIP gr III mob x15 min           HEP: scar mobilization, left 2nd digit flexion OP    Charges:  TherEx x1 (10 min), Manual x2 (30 min)       Total Timed Treatment: 40 min  Total Treatment Time: 40 min

## 2023-11-06 ENCOUNTER — OFFICE VISIT (OUTPATIENT)
Dept: PHYSICAL THERAPY | Age: 48
End: 2023-11-06
Attending: ORTHOPAEDIC SURGERY
Payer: COMMERCIAL

## 2023-11-06 PROCEDURE — 97110 THERAPEUTIC EXERCISES: CPT

## 2023-11-06 PROCEDURE — 97140 MANUAL THERAPY 1/> REGIONS: CPT

## 2023-11-06 NOTE — PROGRESS NOTES
Diagnosis:   S/p left index finger A1 pulley release on 10/12/23   Referring Provider: Bartolo Rudolph  Date of Evaluation:    10/20/2023    Precautions:  None Next MD visit: as needed  Date of Surgery: n/a   Insurance Primary/Secondary: [place] / N/A     # Auth Visits: 8 POC     Subjective: The left hand is feeling stiff today, thinks it may be due to increased activity. Still having increased tingling in digits 1-3 of right hand, and stiffness in 3rd digit. Current Pain: 0/10    Objective:   AROM left HAND:    IF   MP 0 - 60 (70)   PIP 0 - 90   DIP 0 - 55 (55)     ULTT Median: R (-); L (-)    Knee Screen:  ROM: grossly WNL, equal bilaterally  Resistive: strong, pain free quad and hamstring  Special testing: Ligamentous stability WNL, Thessaly's (-)  Step Up (6\"): R WNL; L feels weaker, no pain    Assessment: Continued progress with left index finger mobility. Knee screen performed at end of session - no significant findings besides increased challenge with L step up. Discussed performing L SLS with slight knee flexion for improved control/coordination. Goals: (to be met in 8 visits)   Pt will improve L  strength to >30 lbs to be able to open a jar without difficulty   Pt will demonstrate improved be able to carry >10 # pain free   Pt will improve left index finger PIP flexion to >/= 80 deg  Pt will be independent and compliant with comprehensive HEP to maintain progress achieved in PT    Plan: Continue PT.      Date:   11/3/2023  TX#: 2 Date:  11/6/2023  TX#: 3 Date:  TX#: 4 Date:  TX#: 5 Date:  TX#: 6 Date:  TX#: 7 Date:  TX#: 8   TherEx:  -Left 2nd digit PIP, DIP towel  x20 reps  -Left 2nd digit flexion OP x20 reps  -Towel gripping x20 reps  -Right 3rd digit extension with MP PA overpressure x20 reps TherEx:  -Left 2nd digit PIP, DIP towel  x20 reps  -Left 2nd digit flexion OP x20 reps  -Towel gripping x20 reps  -Right 3rd digit extension with MP PA overpressure x20 reps  -Knee screen x5 min Manual:  -2nd digit scar mobilization x15 min  -2nd digit MP, PIP, DIP gr III mob x15 min Manual:  -2nd digit scar mobilization x15 min  -2nd digit MP, PIP, DIP gr III mob 10 min  -Wrist extension with PA gr III mobilization x5 min          HEP: scar mobilization, left 2nd digit flexion OP    Charges:  TherEx x1 (15 min), Manual x2 (30 min)       Total Timed Treatment: 45 min  Total Treatment Time: 45 min

## 2023-11-10 ENCOUNTER — APPOINTMENT (OUTPATIENT)
Dept: PHYSICAL THERAPY | Age: 48
End: 2023-11-10
Attending: ORTHOPAEDIC SURGERY
Payer: COMMERCIAL

## 2023-11-14 ENCOUNTER — OFFICE VISIT (OUTPATIENT)
Dept: PHYSICAL THERAPY | Age: 48
End: 2023-11-14
Attending: ORTHOPAEDIC SURGERY
Payer: COMMERCIAL

## 2023-11-14 PROCEDURE — 97110 THERAPEUTIC EXERCISES: CPT

## 2023-11-14 PROCEDURE — 97140 MANUAL THERAPY 1/> REGIONS: CPT

## 2023-11-14 NOTE — PROGRESS NOTES
Diagnosis:   S/p left index finger A1 pulley release on 10/12/23   Referring Provider: Daylin Nolan  Date of Evaluation:    10/20/2023    Precautions:  None Next MD visit: as needed  Date of Surgery: n/a   Insurance Primary/Secondary: [place] / N/A     # Auth Visits: 8 POC     Subjective: Still having stiffness and pain with left 2nd finger, pain around scar. Tingling in right hand still persistent. Current Pain: 0/10    Objective:   Strength (lbs) Right Left    : 62 (45 pain onset) 42 (pain onset 30)  End of session: 60 (pain onset 50)     AROM left HAND:    IF   MP 0 - 70 (70)   PIP 0 - 90   DIP 0 - 55 (55)     Assessment: Improved left  strength following exercises. STM performed for increased mobility. Goals: (to be met in 8 visits)   Pt will improve L  strength to >30 lbs to be able to open a jar without difficulty   Pt will demonstrate improved be able to carry >10 # pain free   Pt will improve left index finger PIP flexion to >/= 80 deg  Pt will be independent and compliant with comprehensive HEP to maintain progress achieved in PT    Plan: Continue PT.      Date:   11/3/2023  TX#: 2 Date:  11/6/2023  TX#: 3 Date:  11/14/2023  TX#: 4 Date:  TX#: 5 Date:  TX#: 6 Date:  TX#: 7 Date:  TX#: 8   TherEx:  -Left 2nd digit PIP, DIP towel  x20 reps  -Left 2nd digit flexion OP x20 reps  -Towel gripping x20 reps  -Right 3rd digit extension with MP PA overpressure x20 reps TherEx:  -Left 2nd digit PIP, DIP towel  x20 reps  -Left 2nd digit flexion OP x20 reps  -Towel gripping x20 reps  -Right 3rd digit extension with MP PA overpressure x20 reps  -Knee screen x5 min TherEx:  -Left 2nd digit PIP, DIP towel  x20 reps  -Left 2nd digit flexion OP x20 reps  -Left hand tendon gliding x20 reps  -Full , yellow putty, 5x30 sec  -Lumbrical , yellow putty, 5x30 sec       Manual:  -2nd digit scar mobilization x15 min  -2nd digit MP, PIP, DIP gr III mob x15 min Manual:  -2nd digit scar mobilization x15 min  -2nd digit MP, PIP, DIP gr III mob 10 min  -Wrist extension with PA gr III mobilization x5 min Manual:  -2nd digit IASTM x15 min  -2nd digit MP, PIP, DIP gr III mob 10 min  -Wrist extension with PA gr III mobilization x5 min         HEP: scar mobilization, left 2nd digit flexion OP, putty gripping (full fist, lumbrical ), tendon gliding    Charges:  TherEx x1 (15 min), Manual x2 (30 min)       Total Timed Treatment: 45 min  Total Treatment Time: 45 min

## 2023-11-16 ENCOUNTER — OFFICE VISIT (OUTPATIENT)
Dept: PHYSICAL THERAPY | Age: 48
End: 2023-11-16
Attending: ORTHOPAEDIC SURGERY
Payer: COMMERCIAL

## 2023-11-16 PROCEDURE — 97110 THERAPEUTIC EXERCISES: CPT

## 2023-11-16 PROCEDURE — 97140 MANUAL THERAPY 1/> REGIONS: CPT

## 2023-11-16 NOTE — PROGRESS NOTES
Diagnosis:   S/p left index finger A1 pulley release on 10/12/23   Referring Provider: Regulo Jackson  Date of Evaluation:    10/20/2023    Precautions:  None Next MD visit: as needed  Date of Surgery: n/a   Insurance Primary/Secondary: [employer] [place] P[place] / N/A     # Auth Visits: 8 POC     Subjective: Hand felt ache after last session, didn't do putty exercises yesterday, but ache pain improved today. Current Pain: 0/10    Objective:   Strength (lbs) Right Left    : 62 (45 pain onset) 42 (pain onset 30)  End of session: 60 (pain onset 50)     AROM left HAND:    IF   MP 0 - 70 (70)   PIP 0 - 90   DIP 0 - 55 (55)     Assessment: Continued progress with STM to left 2nd digit scar. Reproduced right hand tingling with palpation at proximal pronator teres - reduced local pain, no immediate change to hand symptoms. Discussed continued HEP. Goals: (to be met in 8 visits)   Pt will improve L  strength to >30 lbs to be able to open a jar without difficulty   Pt will demonstrate improved be able to carry >10 # pain free   Pt will improve left index finger PIP flexion to >/= 80 deg  Pt will be independent and compliant with comprehensive HEP to maintain progress achieved in PT    Plan: Continue PT.      Date:   11/3/2023  TX#: 2 Date:  11/6/2023  TX#: 3 Date:  11/14/2023  TX#: 4 Date:  11/16/2023  TX#: 5 Date:  TX#: 6 Date:  TX#: 7 Date:  TX#: 8   TherEx:  -Left 2nd digit PIP, DIP towel  x20 reps  -Left 2nd digit flexion OP x20 reps  -Towel gripping x20 reps  -Right 3rd digit extension with MP PA overpressure x20 reps TherEx:  -Left 2nd digit PIP, DIP towel  x20 reps  -Left 2nd digit flexion OP x20 reps  -Towel gripping x20 reps  -Right 3rd digit extension with MP PA overpressure x20 reps  -Knee screen x5 min TherEx:  -Left 2nd digit PIP, DIP towel  x20 reps  -Left 2nd digit flexion OP x20 reps  -Left hand tendon gliding x20 reps  -Full , yellow putty, 5x30 sec  -Lumbrical , yellow putty, 5x30 sec TherEx:  -Left 2nd digit PIP, DIP towel  x20 reps  -Left 2nd digit flexion OP x20 reps  -Left hand tendon gliding x20 reps      Manual:  -2nd digit scar mobilization x15 min  -2nd digit MP, PIP, DIP gr III mob x15 min Manual:  -2nd digit scar mobilization x15 min  -2nd digit MP, PIP, DIP gr III mob 10 min  -Wrist extension with PA gr III mobilization x5 min Manual:  -2nd digit IASTM x15 min  -2nd digit MP, PIP, DIP gr III mob 10 min  -Wrist extension with PA gr III mobilization x5 min Manual:  -2nd digit IASTM x15 min  -2nd digit MP, PIP, DIP gr III mob 10 min  -R pronator teres STM x5 min        HEP: scar mobilization, left 2nd digit flexion OP, putty gripping (full fist, lumbrical ), tendon gliding    Charges:  TherEx x1 (10 min), Manual x2 (30 min)       Total Timed Treatment: 40 min  Total Treatment Time: 40 min

## 2023-11-20 ENCOUNTER — OFFICE VISIT (OUTPATIENT)
Dept: PHYSICAL THERAPY | Age: 48
End: 2023-11-20
Attending: ORTHOPAEDIC SURGERY
Payer: COMMERCIAL

## 2023-11-20 PROCEDURE — 97110 THERAPEUTIC EXERCISES: CPT

## 2023-11-20 PROCEDURE — 97140 MANUAL THERAPY 1/> REGIONS: CPT

## 2023-11-20 NOTE — PROGRESS NOTES
Diagnosis:   S/p left index finger A1 pulley release on 10/12/23   Referring Provider: Rosaura Abreu  Date of Evaluation:    10/20/2023    Precautions:  None Next MD visit: as needed  Date of Surgery: n/a   Insurance Primary/Secondary: [employer] [place] / N/A     # Auth Visits: 8 POC     Subjective: Felt okay after Thursday, left hand still stiff, but exercises at home are going well. Current Pain: 0/10    Objective:   Strength (lbs) Right Left    : 62 (45 pain onset) 42 (pain onset 30)  End of session: 60 (pain onset 50)     AROM left HAND:    IF   MP 0 - 70 (70)   PIP 0 - 90   DIP 0 - 55 (55)     Assessment: Continued progress with left hand gripping strength and scar mobility. Incorporated right pronator stretch for right hand symptoms. Goals: (to be met in 8 visits)   Pt will improve L  strength to >30 lbs to be able to open a jar without difficulty   Pt will demonstrate improved be able to carry >10 # pain free   Pt will improve left index finger PIP flexion to >/= 80 deg  Pt will be independent and compliant with comprehensive HEP to maintain progress achieved in PT    Plan: Continue PT. Progress hand/UE resistive exercises.     Date:   11/3/2023  TX#: 2 Date:  11/6/2023  TX#: 3 Date:  11/14/2023  TX#: 4 Date:  11/16/2023  TX#: 5 Date:  11/20/2023  TX#: 6 Date:  TX#: 7 Date:  TX#: 8   TherEx:  -Left 2nd digit PIP, DIP towel  x20 reps  -Left 2nd digit flexion OP x20 reps  -Towel gripping x20 reps  -Right 3rd digit extension with MP PA overpressure x20 reps TherEx:  -Left 2nd digit PIP, DIP towel  x20 reps  -Left 2nd digit flexion OP x20 reps  -Towel gripping x20 reps  -Right 3rd digit extension with MP PA overpressure x20 reps  -Knee screen x5 min TherEx:  -Left 2nd digit PIP, DIP towel  x20 reps  -Left 2nd digit flexion OP x20 reps  -Left hand tendon gliding x20 reps  -Full , yellow putty, 5x30 sec  -Lumbrical , yellow putty, 5x30 sec TherEx:  -Left 2nd digit PIP, DIP towel  x20 reps  -Left 2nd digit flexion OP x20 reps  -Left hand tendon gliding x20 reps TherEx:  -Left 2nd digit PIP, DIP towel  x20 reps  -Left 2nd digit flexion OP x20 reps  -Left hand tendon gliding x20 reps  -Right pronator stretch 6x20 sec hold     Manual:  -2nd digit scar mobilization x15 min  -2nd digit MP, PIP, DIP gr III mob x15 min Manual:  -2nd digit scar mobilization x15 min  -2nd digit MP, PIP, DIP gr III mob 10 min  -Wrist extension with PA gr III mobilization x5 min Manual:  -2nd digit IASTM x15 min  -2nd digit MP, PIP, DIP gr III mob 10 min  -Wrist extension with PA gr III mobilization x5 min Manual:  -2nd digit IASTM x15 min  -2nd digit MP, PIP, DIP gr III mob 10 min  -R pronator teres STM x5 min Manual:  -2nd digit IASTM x15 min  -2nd digit MP, PIP, DIP gr III mob 10 min  -R pronator teres STM x5 min       HEP: scar mobilization, left 2nd digit flexion OP, putty gripping (full fist, lumbrical ), tendon gliding    Charges:  TherEx x1 (10 min), Manual x2 (30 min)       Total Timed Treatment: 40 min  Total Treatment Time: 40 min

## 2023-11-21 ENCOUNTER — APPOINTMENT (OUTPATIENT)
Dept: PHYSICAL THERAPY | Age: 48
End: 2023-11-21
Attending: ORTHOPAEDIC SURGERY
Payer: COMMERCIAL

## 2023-11-28 ENCOUNTER — OFFICE VISIT (OUTPATIENT)
Dept: PHYSICAL THERAPY | Age: 48
End: 2023-11-28
Attending: ORTHOPAEDIC SURGERY
Payer: COMMERCIAL

## 2023-11-28 PROCEDURE — 97110 THERAPEUTIC EXERCISES: CPT

## 2023-11-28 PROCEDURE — 97140 MANUAL THERAPY 1/> REGIONS: CPT

## 2023-11-30 ENCOUNTER — OFFICE VISIT (OUTPATIENT)
Dept: PHYSICAL THERAPY | Age: 48
End: 2023-11-30
Attending: ORTHOPAEDIC SURGERY
Payer: COMMERCIAL

## 2023-11-30 PROCEDURE — 97110 THERAPEUTIC EXERCISES: CPT

## 2023-11-30 PROCEDURE — 97140 MANUAL THERAPY 1/> REGIONS: CPT

## 2023-11-30 NOTE — PROGRESS NOTES
Diagnosis:   S/p left index finger A1 pulley release on 10/12/23   Referring Provider: Kenia Barth  Date of Evaluation:    10/20/2023    Precautions:  None Next MD visit: as needed  Date of Surgery: n/a   Insurance Primary/Secondary: [employer] / N/A     # Auth Visits: 8 POC     Subjective: Left hand is feeling sore today but feels less stiff. States he has consult with surgeon regarding his right hand symptoms. Current Pain: 0/10    Objective:   Strength (lbs) Right Left    : 59 (59 pain onset) 60 (pain onset 60)  End of session: 60 (reports less intense pain)     Assessment: Continued progress with scar mobilization and desensitization of left index finger. Discussed progressing stretch from last session per his tolerance. Goals: (to be met in 8 visits)   Pt will improve L  strength to >30 lbs to be able to open a jar without difficulty   Pt will demonstrate improved be able to carry >10 # pain free   Pt will improve left index finger PIP flexion to >/= 80 deg  Pt will be independent and compliant with comprehensive HEP to maintain progress achieved in PT    Plan: Continue PT. Progress hand/UE resistive exercises.     Date:   11/3/2023  TX#: 2 Date:  11/6/2023  TX#: 3 Date:  11/14/2023  TX#: 4 Date:  11/16/2023  TX#: 5 Date:  11/20/2023  TX#: 6 Date:  11/28/2023  TX#: 7 Date: 11/30/2023  TX#: 8   TherEx:  -Left 2nd digit PIP, DIP towel  x20 reps  -Left 2nd digit flexion OP x20 reps  -Towel gripping x20 reps  -Right 3rd digit extension with MP PA overpressure x20 reps TherEx:  -Left 2nd digit PIP, DIP towel  x20 reps  -Left 2nd digit flexion OP x20 reps  -Towel gripping x20 reps  -Right 3rd digit extension with MP PA overpressure x20 reps  -Knee screen x5 min TherEx:  -Left 2nd digit PIP, DIP towel  x20 reps  -Left 2nd digit flexion OP x20 reps  -Left hand tendon gliding x20 reps  -Full , yellow putty, 5x30 sec  -Lumbrical , yellow putty, 5x30 sec TherEx:  -Left 2nd digit PIP, DIP towel  x20 reps  -Left 2nd digit flexion OP x20 reps  -Left hand tendon gliding x20 reps TherEx:  -Left 2nd digit PIP, DIP towel  x20 reps  -Left 2nd digit flexion OP x20 reps  -Left hand tendon gliding x20 reps  -Right pronator stretch 6x20 sec hold TherEx:  -Left 2nd digit PIP, DIP towel  x20 reps  -Left 2nd digit flexion OP x20 reps  -Left hand tendon gliding x20 reps   TherEx:  -Left 2nd digit PIP, DIP towel  x20 reps  -Left 2nd digit flexion OP x20 reps  -Left hand tendon gliding x20 reps   Manual:  -2nd digit scar mobilization x15 min  -2nd digit MP, PIP, DIP gr III mob x15 min Manual:  -2nd digit scar mobilization x15 min  -2nd digit MP, PIP, DIP gr III mob 10 min  -Wrist extension with PA gr III mobilization x5 min Manual:  -2nd digit IASTM x15 min  -2nd digit MP, PIP, DIP gr III mob 10 min  -Wrist extension with PA gr III mobilization x5 min Manual:  -2nd digit IASTM x15 min  -2nd digit MP, PIP, DIP gr III mob 10 min  -R pronator teres STM x5 min Manual:  -2nd digit IASTM x15 min  -2nd digit MP, PIP, DIP gr III mob 10 min  -R pronator teres STM x5 min Manual:  -2nd digit IASTM x15 min  -2nd digit MP, PIP, DIP gr III mob 10 min  -R pronator teres STM x5 min Manual:  -2nd digit IASTM x15 min       HEP: scar mobilization, left 2nd digit flexion OP, putty gripping (full fist, lumbrical ), tendon gliding    Charges:  TherEx x1 (15 min), Manual x1 (15 min)       Total Timed Treatment: 30 min  Total Treatment Time: 30 min

## 2023-12-05 ENCOUNTER — OFFICE VISIT (OUTPATIENT)
Dept: PHYSICAL THERAPY | Age: 48
End: 2023-12-05
Attending: ORTHOPAEDIC SURGERY
Payer: COMMERCIAL

## 2023-12-05 ENCOUNTER — TELEPHONE (OUTPATIENT)
Dept: ORTHOPEDICS CLINIC | Facility: CLINIC | Age: 48
End: 2023-12-05

## 2023-12-05 DIAGNOSIS — Z01.89 ENCOUNTER FOR LOWER EXTREMITY COMPARISON IMAGING STUDY: ICD-10-CM

## 2023-12-05 DIAGNOSIS — M25.362 KNEE GIVES OUT, LEFT: Primary | ICD-10-CM

## 2023-12-05 PROCEDURE — 97140 MANUAL THERAPY 1/> REGIONS: CPT

## 2023-12-05 PROCEDURE — 97110 THERAPEUTIC EXERCISES: CPT

## 2023-12-05 NOTE — PROGRESS NOTES
Diagnosis:   S/p left index finger A1 pulley release on 10/12/23   Referring Provider: Joycelyn Holliday  Date of Evaluation:    10/20/2023    Precautions:  None Next MD visit: as needed  Date of Surgery: n/a   Insurance Primary/Secondary: [employer] [place] / N/A     # Auth Visits: 8 POC     Subjective: Saw surgeon last Friday, happy with progress for left hand. Discussed right hand, started prednisone on Sunday, no change to right hand symptoms. If no success with prednisone with try   Current Pain: 0/10    Objective:   Strength (lbs) Right Left    : 59 (59 pain onset) 66     Assessment: Continued progress with  strength. Incorporated light UE exercises, mild/temporary clicking with tendon glide following exercise, but improved with rest. Discussed appropriate exercise progression based on symptom response.      Goals: (to be met in 8 visits)   Pt will improve L  strength to >30 lbs to be able to open a jar without difficulty   Pt will demonstrate improved be able to carry >10 # pain free   Pt will improve left index finger PIP flexion to >/= 80 deg  Pt will be independent and compliant with comprehensive HEP to maintain progress achieved in PT    Plan: Continue PT      Date:  11/6/2023  TX#: 3 Date:  11/14/2023  TX#: 4 Date:  11/16/2023  TX#: 5 Date:  11/20/2023  TX#: 6 Date:  11/28/2023  TX#: 7 Date: 11/30/2023  TX#: 8 Date:   12/5/2023  TX#: 9   TherEx:  -Left 2nd digit PIP, DIP towel  x20 reps  -Left 2nd digit flexion OP x20 reps  -Towel gripping x20 reps  -Right 3rd digit extension with MP PA overpressure x20 reps  -Knee screen x5 min TherEx:  -Left 2nd digit PIP, DIP towel  x20 reps  -Left 2nd digit flexion OP x20 reps  -Left hand tendon gliding x20 reps  -Full , yellow putty, 5x30 sec  -Lumbrical , yellow putty, 5x30 sec TherEx:  -Left 2nd digit PIP, DIP towel  x20 reps  -Left 2nd digit flexion OP x20 reps  -Left hand tendon gliding x20 reps TherEx:  -Left 2nd digit PIP, DIP towel  x20 reps  -Left 2nd digit flexion OP x20 reps  -Left hand tendon gliding x20 reps  -Right pronator stretch 6x20 sec hold TherEx:  -Left 2nd digit PIP, DIP towel  x20 reps  -Left 2nd digit flexion OP x20 reps  -Left hand tendon gliding x20 reps   TherEx:  -Left 2nd digit PIP, DIP towel  x20 reps  -Left 2nd digit flexion OP x20 reps  -Left hand tendon gliding x20 reps TherEx:  -Left 2nd digit PIP, DIP towel  x20 reps  -Left 2nd digit flexion OP x20 reps  -Left hand tendon gliding x20 reps  -Bicep curl, L, 5# 2x15 reps  -Wall ball press CW/CCW, L 2x15 reps  -Triceps pull down 15# 2x10 reps   Manual:  -2nd digit scar mobilization x15 min  -2nd digit MP, PIP, DIP gr III mob 10 min  -Wrist extension with PA gr III mobilization x5 min Manual:  -2nd digit IASTM x15 min  -2nd digit MP, PIP, DIP gr III mob 10 min  -Wrist extension with PA gr III mobilization x5 min Manual:  -2nd digit IASTM x15 min  -2nd digit MP, PIP, DIP gr III mob 10 min  -R pronator teres STM x5 min Manual:  -2nd digit IASTM x15 min  -2nd digit MP, PIP, DIP gr III mob 10 min  -R pronator teres STM x5 min Manual:  -2nd digit IASTM x15 min  -2nd digit MP, PIP, DIP gr III mob 10 min  -R pronator teres STM x5 min Manual:  -2nd digit IASTM x15 min   Manual:  -2nd digit IASTM x15 min  -2nd digit MP, PIP, DIP gr III mob 10 min     HEP: scar mobilization, left 2nd digit flexion OP, putty gripping (full fist, lumbrical ), tendon gliding    Charges:  TherEx x1 (15 min), Manual x2 (25 min)       Total Timed Treatment: 40 min  Total Treatment Time: 40 min

## 2023-12-05 NOTE — TELEPHONE ENCOUNTER
XR ordered per ortho protocol. XR scheduled and patient was notified via [facility] to let them know that they should arrive 15-20 minutes early, in order for them to complete imaging.

## 2023-12-06 ENCOUNTER — OFFICE VISIT (OUTPATIENT)
Dept: ORTHOPEDICS CLINIC | Facility: CLINIC | Age: 48
End: 2023-12-06
Payer: COMMERCIAL

## 2023-12-06 ENCOUNTER — HOSPITAL ENCOUNTER (OUTPATIENT)
Dept: GENERAL RADIOLOGY | Age: 48
Discharge: HOME | End: 2023-12-06
Attending: ORTHOPAEDIC SURGERY
Payer: COMMERCIAL

## 2023-12-06 VITALS — WEIGHT: 194 LBS | BODY MASS INDEX: 29.4 KG/M2 | HEIGHT: 68 IN

## 2023-12-06 DIAGNOSIS — G89.29 CHRONIC PAIN OF LEFT KNEE: Primary | ICD-10-CM

## 2023-12-06 DIAGNOSIS — M25.362 INSTABILITY OF LEFT KNEE JOINT: ICD-10-CM

## 2023-12-06 DIAGNOSIS — Z01.89 ENCOUNTER FOR LOWER EXTREMITY COMPARISON IMAGING STUDY: ICD-10-CM

## 2023-12-06 DIAGNOSIS — M25.562 CHRONIC PAIN OF LEFT KNEE: Primary | ICD-10-CM

## 2023-12-06 DIAGNOSIS — M25.362 KNEE GIVES OUT, LEFT: ICD-10-CM

## 2023-12-06 PROCEDURE — 73564 X-RAY EXAM KNEE 4 OR MORE: CPT | Performed by: ORTHOPAEDIC SURGERY

## 2023-12-06 PROCEDURE — 99244 OFF/OP CNSLTJ NEW/EST MOD 40: CPT | Performed by: ORTHOPAEDIC SURGERY

## 2023-12-06 PROCEDURE — 73562 X-RAY EXAM OF KNEE 3: CPT | Performed by: ORTHOPAEDIC SURGERY

## 2023-12-06 PROCEDURE — 3008F BODY MASS INDEX DOCD: CPT | Performed by: ORTHOPAEDIC SURGERY

## 2023-12-06 RX ORDER — PREDNISONE 10 MG/1
TABLET ORAL
COMMUNITY
Start: 2023-12-03

## 2023-12-07 ENCOUNTER — OFFICE VISIT (OUTPATIENT)
Dept: PHYSICAL THERAPY | Age: 48
End: 2023-12-07
Attending: ORTHOPAEDIC SURGERY
Payer: COMMERCIAL

## 2023-12-07 PROCEDURE — 97140 MANUAL THERAPY 1/> REGIONS: CPT

## 2023-12-07 PROCEDURE — 97110 THERAPEUTIC EXERCISES: CPT

## 2023-12-07 NOTE — PROGRESS NOTES
Diagnosis:   S/p left index finger A1 pulley release on 10/12/23   Referring Provider: Garima Junior  Date of Evaluation:    10/20/2023    Precautions:  None Next MD visit: as needed  Date of Surgery: n/a   Insurance Primary/Secondary: [employer] [place]PO / N/A     # Auth Visits: 8 POC     Subjective: Doing well today. Mild clicking with tendon glides, but not painful afterwards. No clicking with ADLs. Current Pain: 0/10    Objective:   Palpation: nodule to left index finger, TTP alleviated with IASTM  AROM: (* denotes performed with pain)  Elbow Wrist   Grossly WNL all planes, equal bilatearlly Flexion: R 90, L 90  Extension: R 80, L 90  Ulnar Deviation: R 40, L 40  Radial Deviation R 25, L 25     AROM left HAND:    IF   MP 0 - 90   PIP 0 - 90   DIP 0 - 55     Assessment: Reduced pain along left index finger following IASTM. Discussed modification with tendon glide exercise if clicking persists.       Goals: (to be met in 8 visits)   Pt will improve L  strength to >30 lbs to be able to open a jar without difficulty Met  Pt will demonstrate improved be able to carry >10 # pain free   Pt will improve left index finger PIP flexion to >/= 80 deg Met  Pt will be independent and compliant with comprehensive HEP to maintain progress achieved in PT    Plan: Continue PT      Date:  11/14/2023  TX#: 4 Date:  11/16/2023  TX#: 5 Date:  11/20/2023  TX#: 6 Date:  11/28/2023  TX#: 7 Date: 11/30/2023  TX#: 8 Date:   12/5/2023  TX#: 9 Date:   12/7/2023  TX#: 10   TherEx:  -Left 2nd digit PIP, DIP towel  x20 reps  -Left 2nd digit flexion OP x20 reps  -Left hand tendon gliding x20 reps  -Full , yellow putty, 5x30 sec  -Lumbrical , yellow putty, 5x30 sec TherEx:  -Left 2nd digit PIP, DIP towel  x20 reps  -Left 2nd digit flexion OP x20 reps  -Left hand tendon gliding x20 reps TherEx:  -Left 2nd digit PIP, DIP towel  x20 reps  -Left 2nd digit flexion OP x20 reps  -Left hand tendon gliding x20 reps  -Right pronator stretch 6x20 sec hold TherEx:  -Left 2nd digit PIP, DIP towel  x20 reps  -Left 2nd digit flexion OP x20 reps  -Left hand tendon gliding x20 reps   TherEx:  -Left 2nd digit PIP, DIP towel  x20 reps  -Left 2nd digit flexion OP x20 reps  -Left hand tendon gliding x20 reps TherEx:  -Left 2nd digit PIP, DIP towel  x20 reps  -Left 2nd digit flexion OP x20 reps  -Left hand tendon gliding x20 reps  -Bicep curl, L, 5# 2x15 reps  -Wall ball press CW/CCW, L 2x15 reps  -Triceps pull down 15# 2x10 reps TherEx:  -Left 2nd digit PIP, DIP towel  x20 reps  -Left 2nd digit flexion OP x20 reps  -Left hand tendon gliding x20 reps  -Bicep curl, L, 5# 2x15 reps  -Wall ball press CW/CCW, L 2x15 reps  -Triceps pull down 15# 2x10 reps   Manual:  -2nd digit IASTM x15 min  -2nd digit MP, PIP, DIP gr III mob 10 min  -Wrist extension with PA gr III mobilization x5 min Manual:  -2nd digit IASTM x15 min  -2nd digit MP, PIP, DIP gr III mob 10 min  -R pronator teres STM x5 min Manual:  -2nd digit IASTM x15 min  -2nd digit MP, PIP, DIP gr III mob 10 min  -R pronator teres STM x5 min Manual:  -2nd digit IASTM x15 min  -2nd digit MP, PIP, DIP gr III mob 10 min  -R pronator teres STM x5 min Manual:  -2nd digit IASTM x15 min   Manual:  -2nd digit IASTM x15 min  -2nd digit MP, PIP, DIP gr III mob 10 min Manual:  -2nd digit IASTM x15 min  -2nd digit MP, PIP, DIP gr III mob 10 min     HEP: scar mobilization, left 2nd digit flexion OP, putty gripping (full fist, lumbrical ), tendon gliding    Charges:  TherEx x1 (15 min), Manual x2 (25 min)       Total Timed Treatment: 40 min  Total Treatment Time: 40 min

## 2023-12-12 ENCOUNTER — OFFICE VISIT (OUTPATIENT)
Dept: PHYSICAL THERAPY | Age: 48
End: 2023-12-12
Attending: ORTHOPAEDIC SURGERY
Payer: COMMERCIAL

## 2023-12-12 PROCEDURE — 97140 MANUAL THERAPY 1/> REGIONS: CPT

## 2023-12-12 PROCEDURE — 97110 THERAPEUTIC EXERCISES: CPT

## 2023-12-12 NOTE — PROGRESS NOTES
Diagnosis:   S/p left index finger A1 pulley release on 10/12/23   Referring Provider: Babs Martines  Date of Evaluation:    10/20/2023    Precautions:  None Next MD visit: as needed  Date of Surgery: n/a   Insurance Primary/Secondary: [employer] [place] / N/A     # Auth Visits: 8 POC     Subjective: Exercises are going well, but still noticing clicking after doing a few reps of tendon gliding. Current Pain: 0/10    Objective:   Palpation: nodule to left index finger, TTP alleviated with IASTM    AROM left HAND:    IF   MP 0 - 90   PIP 0 - 90   DIP 0 - 55     Assessment: Reduced irritability with IASTM, no longer having as sharp of an increase in pain with massage. Incorporated hand intrinsic exercises with reports of fatigue.       Goals: (to be met in 8 visits)   Pt will improve L  strength to >30 lbs to be able to open a jar without difficulty Met  Pt will demonstrate improved be able to carry >10 # pain free   Pt will improve left index finger PIP flexion to >/= 80 deg Met  Pt will be independent and compliant with comprehensive HEP to maintain progress achieved in PT    Plan: Continue PT      Date:  11/16/2023  TX#: 5 Date:  11/20/2023  TX#: 6 Date:  11/28/2023  TX#: 7 Date: 11/30/2023  TX#: 8 Date:   12/5/2023  TX#: 9 Date:   12/7/2023  TX#: 10 Date:   12/12/2023  TX#: 11   TherEx:  -Left 2nd digit PIP, DIP towel  x20 reps  -Left 2nd digit flexion OP x20 reps  -Left hand tendon gliding x20 reps TherEx:  -Left 2nd digit PIP, DIP towel  x20 reps  -Left 2nd digit flexion OP x20 reps  -Left hand tendon gliding x20 reps  -Right pronator stretch 6x20 sec hold TherEx:  -Left 2nd digit PIP, DIP towel  x20 reps  -Left 2nd digit flexion OP x20 reps  -Left hand tendon gliding x20 reps   TherEx:  -Left 2nd digit PIP, DIP towel  x20 reps  -Left 2nd digit flexion OP x20 reps  -Left hand tendon gliding x20 reps TherEx:  -Left 2nd digit PIP, DIP towel  x20 reps  -Left 2nd digit flexion OP x20 reps  -Left hand tendon gliding x20 reps  -Bicep curl, L, 5# 2x15 reps  -Wall ball press CW/CCW, L 2x15 reps  -Triceps pull down 15# 2x10 reps TherEx:  -Left 2nd digit PIP, DIP towel  x20 reps  -Left 2nd digit flexion OP x20 reps  -Left hand tendon gliding x20 reps  -Bicep curl, L, 5# 2x15 reps  -Wall ball press CW/CCW, L 2x15 reps  -Triceps pull down 15# 2x10 reps TherEx:  -Left 2nd digit PIP, DIP towel  x20 reps  -Left 2nd digit flexion OP x20 reps  -Left hand tendon gliding x20 reps  -Isometric thumb opposition 10x15 sec hold  -Isometric 2nd digit ABD 10x15 sec hold   Manual:  -2nd digit IASTM x15 min  -2nd digit MP, PIP, DIP gr III mob 10 min  -R pronator teres STM x5 min Manual:  -2nd digit IASTM x15 min  -2nd digit MP, PIP, DIP gr III mob 10 min  -R pronator teres STM x5 min Manual:  -2nd digit IASTM x15 min  -2nd digit MP, PIP, DIP gr III mob 10 min  -R pronator teres STM x5 min Manual:  -2nd digit IASTM x15 min   Manual:  -2nd digit IASTM x15 min  -2nd digit MP, PIP, DIP gr III mob 10 min Manual:  -2nd digit IASTM x15 min  -2nd digit MP, PIP, DIP gr III mob 10 min Manual:  -2nd digit IASTM x15 min  -2nd digit MP, PIP, DIP gr III mob 10 min     HEP: scar mobilization, left 2nd digit flexion OP, putty gripping (full fist, lumbrical ), tendon gliding    Charges:  TherEx x1 (15 min), Manual x2 (25 min)       Total Timed Treatment: 40 min  Total Treatment Time: 40 min

## 2023-12-14 ENCOUNTER — OFFICE VISIT (OUTPATIENT)
Dept: PHYSICAL THERAPY | Age: 48
End: 2023-12-14
Attending: ORTHOPAEDIC SURGERY
Payer: COMMERCIAL

## 2023-12-14 PROCEDURE — 97140 MANUAL THERAPY 1/> REGIONS: CPT

## 2023-12-14 PROCEDURE — 97110 THERAPEUTIC EXERCISES: CPT

## 2023-12-14 NOTE — PROGRESS NOTES
Diagnosis:   S/p left index finger A1 pulley release on 10/12/23   Referring Provider: Bartolo Rudolph  Date of Evaluation:    10/20/2023    Precautions:  None Next MD visit: as needed  Date of Surgery: n/a   Insurance Primary/Secondary: [employer] [place] / N/A     # Auth Visits: 8 POC     Subjective: Left  hand is doing well, not a persistent clicking with gripping as last session. States he will discuss cortisone injection for right hand with surgeon. Current Pain: 0/10    Objective:   Palpation: minimal TTP along left hand    AROM left HAND:    IF   MP 0 - 90   PIP 0 - 90   DIP 0 - 55     Left 2nd Digit MMT   Flexion: 5/5  Extension: 5/5  ABD 5/5  ADD 5/5     Assessment: Able to progress UE exercises without increased hand pain secondary to increased gripping. He will start a general exercise routine in fitness center. Coninuted     Goals: (to be met in 8 visits)   Pt will improve L  strength to >30 lbs to be able to open a jar without difficulty Met  Pt will demonstrate improved be able to carry >10 # pain free   Pt will improve left index finger PIP flexion to >/= 80 deg Met  Pt will be independent and compliant with comprehensive HEP to maintain progress achieved in PT    Plan: Continue independent HEP for 2 weeks, then return to clinic to ensure success with fitness center routine without symptom regression.       Date:  11/20/2023  TX#: 6 Date:  11/28/2023  TX#: 7 Date: 11/30/2023  TX#: 8 Date:   12/5/2023  TX#: 9 Date:   12/7/2023  TX#: 10 Date:   12/12/2023  TX#: 11 Date:   12/14/2023  TX#: 12   TherEx:  -Left 2nd digit PIP, DIP towel  x20 reps  -Left 2nd digit flexion OP x20 reps  -Left hand tendon gliding x20 reps  -Right pronator stretch 6x20 sec hold TherEx:  -Left 2nd digit PIP, DIP towel  x20 reps  -Left 2nd digit flexion OP x20 reps  -Left hand tendon gliding x20 reps   TherEx:  -Left 2nd digit PIP, DIP towel  x20 reps  -Left 2nd digit flexion OP x20 reps  -Left hand tendon gliding x20 reps TherEx:  -Left 2nd digit PIP, DIP towel  x20 reps  -Left 2nd digit flexion OP x20 reps  -Left hand tendon gliding x20 reps  -Bicep curl, L, 5# 2x15 reps  -Wall ball press CW/CCW, L 2x15 reps  -Triceps pull down 15# 2x10 reps TherEx:  -Left 2nd digit PIP, DIP towel  x20 reps  -Left 2nd digit flexion OP x20 reps  -Left hand tendon gliding x20 reps  -Bicep curl, L, 5# 2x15 reps  -Wall ball press CW/CCW, L 2x15 reps  -Triceps pull down 15# 2x10 reps TherEx:  -Left 2nd digit PIP, DIP towel  x20 reps  -Left 2nd digit flexion OP x20 reps  -Left hand tendon gliding x20 reps  -Isometric thumb opposition 10x15 sec hold  -Isometric 2nd digit ABD 10x15 sec hold TherEx:  -Bicep curl, L, 10# 2x15 reps  -Triceps pull down 15# 2x10 reps  -Row 15# 2x10 reps  -Left 2nd digit PIP, DIP towel  x20 reps  -Left 2nd digit flexion OP x20 reps  -Left hand tendon gliding x20 reps  -Isometric thumb opposition 10x15 sec hold  -Isometric 2nd digit ABD 10x15 sec hold   Manual:  -2nd digit IASTM x15 min  -2nd digit MP, PIP, DIP gr III mob 10 min  -R pronator teres STM x5 min Manual:  -2nd digit IASTM x15 min  -2nd digit MP, PIP, DIP gr III mob 10 min  -R pronator teres STM x5 min Manual:  -2nd digit IASTM x15 min   Manual:  -2nd digit IASTM x15 min  -2nd digit MP, PIP, DIP gr III mob 10 min Manual:  -2nd digit IASTM x15 min  -2nd digit MP, PIP, DIP gr III mob 10 min Manual:  -2nd digit IASTM x15 min  -2nd digit MP, PIP, DIP gr III mob 10 min Manual:  -2nd digit IASTM x15 min     HEP: scar mobilization, left 2nd digit flexion OP, putty gripping (full fist, lumbrical ), tendon gliding    Charges:  TherEx x2 (25 min), Manual x1 (15 min)       Total Timed Treatment: 40 min  Total Treatment Time: 40 min

## 2024-01-04 ENCOUNTER — OFFICE VISIT (OUTPATIENT)
Dept: PHYSICAL THERAPY | Age: 49
End: 2024-01-04
Attending: ORTHOPAEDIC SURGERY
Payer: COMMERCIAL

## 2024-01-04 PROCEDURE — 97035 APP MDLTY 1+ULTRASOUND EA 15: CPT

## 2024-01-04 PROCEDURE — 97110 THERAPEUTIC EXERCISES: CPT

## 2024-01-04 NOTE — PROGRESS NOTES
Diagnosis:   S/p left index finger A1 pulley release on 10/12/23   Referring Provider: Carlene  Date of Evaluation:    10/20/2023    Precautions:  None Next MD visit: as needed  Date of Surgery: n/a   Insurance Primary/Secondary: [employer] [place] PPO / N/A     # Auth Visits: 8 POC     Subjective: Has noticed occasional popping/clicking in left 3rd digit. States had follow-up with hand surgeon, recommended US for left 2nd digit incision.  Current Pain: 0/10    Objective:   Palpation: minimal TTP along left hand    Left 2nd Digit MMT   Flexion: 5/5  Extension: 5/5  ABD 5/5  ADD 5/5      Strength: 81 lbs 79 lbs     Assessment: Improved  strength. Will continue PT interventions to incorporate therapeutic US for tissue extensibility.      Goals: (to be met in 8 visits)   Pt will improve L  strength to >30 lbs to be able to open a jar without difficulty Met  Pt will demonstrate improved be able to carry >10 # pain free   Pt will improve left index finger PIP flexion to >/= 80 deg Met  Pt will be independent and compliant with comprehensive HEP to maintain progress achieved in PT    Plan: Continue PT interventions to incorporate therapeutic US     Date:  11/28/2023  TX#: 7 Date: 11/30/2023  TX#: 8 Date:   12/5/2023  TX#: 9 Date:   12/7/2023  TX#: 10 Date:   12/12/2023  TX#: 11 Date:   12/14/2023  TX#: 12 Date:   1/4/2024  TX#: 13   TherEx:  -Left 2nd digit PIP, DIP towel  x20 reps  -Left 2nd digit flexion OP x20 reps  -Left hand tendon gliding x20 reps   TherEx:  -Left 2nd digit PIP, DIP towel  x20 reps  -Left 2nd digit flexion OP x20 reps  -Left hand tendon gliding x20 reps TherEx:  -Left 2nd digit PIP, DIP towel  x20 reps  -Left 2nd digit flexion OP x20 reps  -Left hand tendon gliding x20 reps  -Bicep curl, L, 5# 2x15 reps  -Wall ball press CW/CCW, L 2x15 reps  -Triceps pull down 15# 2x10 reps TherEx:  -Left 2nd digit PIP, DIP towel  x20 reps  -Left 2nd digit flexion OP x20 reps  -Left hand tendon gliding  x20 reps  -Bicep curl, L, 5# 2x15 reps  -Wall ball press CW/CCW, L 2x15 reps  -Triceps pull down 15# 2x10 reps TherEx:  -Left 2nd digit PIP, DIP towel  x20 reps  -Left 2nd digit flexion OP x20 reps  -Left hand tendon gliding x20 reps  -Isometric thumb opposition 10x15 sec hold  -Isometric 2nd digit ABD 10x15 sec hold TherEx:  -Bicep curl, L, 10# 2x15 reps  -Triceps pull down 15# 2x10 reps  -Row 15# 2x10 reps  -Left 2nd digit PIP, DIP towel  x20 reps  -Left 2nd digit flexion OP x20 reps  -Left hand tendon gliding x20 reps  -Isometric thumb opposition 10x15 sec hold  -Isometric 2nd digit ABD 10x15 sec hold TherEx:  -Hand reassessment x5 min  -Gripping, L, x 5 reps, max effort   Manual:  -2nd digit IASTM x15 min  -2nd digit MP, PIP, DIP gr III mob 10 min  -R pronator teres STM x5 min Manual:  -2nd digit IASTM x15 min   Manual:  -2nd digit IASTM x15 min  -2nd digit MP, PIP, DIP gr III mob 10 min Manual:  -2nd digit IASTM x15 min  -2nd digit MP, PIP, DIP gr III mob 10 min Manual:  -2nd digit IASTM x15 min  -2nd digit MP, PIP, DIP gr III mob 10 min Manual:  -2nd digit IASTM x15 min  -         Modalities:  -US cont., 3 MHz, 0.3 W/cm2, 15 min     HEP: scar mobilization, left 2nd digit flexion OP, putty gripping (full fist, lumbrical ), tendon gliding    Charges: TherEx x1 (15 min), Ultrasound x1 (15 min)       Total Timed Treatment: 30 min  Total Treatment Time: 30 min

## 2024-01-09 ENCOUNTER — APPOINTMENT (OUTPATIENT)
Dept: PHYSICAL THERAPY | Age: 49
End: 2024-01-09
Attending: ORTHOPAEDIC SURGERY
Payer: COMMERCIAL

## 2024-01-11 ENCOUNTER — OFFICE VISIT (OUTPATIENT)
Dept: INTERNAL MEDICINE CLINIC | Facility: CLINIC | Age: 49
End: 2024-01-11
Payer: COMMERCIAL

## 2024-01-11 ENCOUNTER — APPOINTMENT (OUTPATIENT)
Dept: PHYSICAL THERAPY | Age: 49
End: 2024-01-11
Attending: ORTHOPAEDIC SURGERY
Payer: COMMERCIAL

## 2024-01-11 VITALS
DIASTOLIC BLOOD PRESSURE: 76 MMHG | BODY MASS INDEX: 28.34 KG/M2 | RESPIRATION RATE: 14 BRPM | WEIGHT: 187 LBS | TEMPERATURE: 98 F | HEIGHT: 68 IN | SYSTOLIC BLOOD PRESSURE: 118 MMHG | OXYGEN SATURATION: 97 % | HEART RATE: 88 BPM

## 2024-01-11 DIAGNOSIS — J01.00 ACUTE NON-RECURRENT MAXILLARY SINUSITIS: Primary | ICD-10-CM

## 2024-01-11 DIAGNOSIS — H65.03 NON-RECURRENT ACUTE SEROUS OTITIS MEDIA OF BOTH EARS: ICD-10-CM

## 2024-01-11 PROCEDURE — 3074F SYST BP LT 130 MM HG: CPT | Performed by: STUDENT IN AN ORGANIZED HEALTH CARE EDUCATION/TRAINING PROGRAM

## 2024-01-11 PROCEDURE — 3078F DIAST BP <80 MM HG: CPT | Performed by: STUDENT IN AN ORGANIZED HEALTH CARE EDUCATION/TRAINING PROGRAM

## 2024-01-11 PROCEDURE — 3008F BODY MASS INDEX DOCD: CPT | Performed by: STUDENT IN AN ORGANIZED HEALTH CARE EDUCATION/TRAINING PROGRAM

## 2024-01-11 PROCEDURE — 99213 OFFICE O/P EST LOW 20 MIN: CPT | Performed by: STUDENT IN AN ORGANIZED HEALTH CARE EDUCATION/TRAINING PROGRAM

## 2024-01-11 RX ORDER — CEFUROXIME AXETIL 250 MG/1
250 TABLET ORAL 2 TIMES DAILY
Qty: 20 TABLET | Refills: 0 | Status: SHIPPED | OUTPATIENT
Start: 2024-01-11

## 2024-01-11 RX ORDER — METHYLPREDNISOLONE 4 MG/1
TABLET ORAL
Qty: 1 EACH | Refills: 0 | Status: SHIPPED | OUTPATIENT
Start: 2024-01-11

## 2024-01-11 NOTE — PROGRESS NOTES
OFFICE NOTE     Patient ID: Kelton Ba is a 48 year old male.  Today's Date: 01/11/24  Chief Complaint: Sinus Problem (Onset 4d ago - sinus congestion, sinus pressure, post nasal drip, teeth hurt, ears feel plugged, and exhaustion. Denies fever, cough, or ST)    Sinus Problem  This is a new problem. The current episode started in the past 7 days. The problem has been gradually worsening since onset. There has been no fever. His pain is at a severity of 7/10. The pain is moderate. Associated symptoms include congestion, ear pain, headaches, a hoarse voice and sinus pressure. Pertinent negatives include no chills, coughing, neck pain, shortness of breath, sore throat or swollen glands. Past treatments include nothing.   Ear Problem   There is pain in the right ear. This is a new problem. The current episode started yesterday. The problem has been gradually worsening. There has been no fever. The pain is at a severity of 7/10. The pain is moderate. Associated symptoms include headaches, hearing loss and rhinorrhea. Pertinent negatives include no abdominal pain, coughing, diarrhea, ear discharge, neck pain, sore throat or vomiting. He has tried nothing for the symptoms. There is no history of a chronic ear infection, hearing loss or a tympanostomy tube.         Vitals:    01/11/24 1003   BP: 118/76   Pulse: 88   Resp: 14   Temp: 98.3 °F (36.8 °C)   TempSrc: Temporal   SpO2: 97%   Weight: 187 lb (84.8 kg)   Height: 5' 8\" (1.727 m)     body mass index is 28.43 kg/m².  BP Readings from Last 3 Encounters:   01/11/24 118/76   09/29/23 120/70   06/09/23 102/70     The 10-year ASCVD risk score (Brandon ASHTON, et al., 2019) is: 2.5%    Values used to calculate the score:      Age: 48 years      Sex: Male      Is Non- : No      Diabetic: No      Tobacco smoker: No      Systolic Blood Pressure: 118 mmHg      Is BP treated: No      HDL Cholesterol: 44 mg/dL      Total Cholesterol: 160  mg/dL      Medications reviewed:  Current Outpatient Medications   Medication Sig Dispense Refill    cefuroxime 250 MG Oral Tab Take 1 tablet (250 mg total) by mouth 2 (two) times daily. 20 tablet 0    methylPREDNISolone (MEDROL) 4 MG Oral Tablet Therapy Pack As directed. 1 each 0    ergocalciferol 1.25 MG (75064 UT) Oral Cap Take 1 capsule (50,000 Units total) by mouth once a week. 12 capsule 0    Fluticasone Propionate 50 MCG/ACT Nasal Suspension 2 sprays by Each Nare route daily. 16 g 0    loratadine 10 MG Oral Tab Take 1 tablet (10 mg total) by mouth as needed.           Assessment & Plan    1. Acute non-recurrent maxillary sinusitis (Primary)  2. Non-recurrent acute serous otitis media of both ears  Symptoms consistent with Acute sinusitis with associated acute otitis media bilaterally. Will benefit from course of steroids and antibiotics. Patient to continue Flonase and other supportive management at home  -     Cefuroxime Axetil; Take 1 tablet (250 mg total) by mouth 2 (two) times daily.  Dispense: 20 tablet; Refill: 0  -     methylPREDNISolone; As directed.  Dispense: 1 each; Refill: 0        Follow Up: As needed/if symptoms worsen       Objective/ Results:   Physical Exam  Constitutional:       Appearance: Normal appearance. He is normal weight.   HENT:      Head: Normocephalic and atraumatic.      Right Ear: Ear canal and external ear normal. Decreased hearing noted. No mastoid tenderness. Tympanic membrane is injected, erythematous and bulging.      Left Ear: Ear canal and external ear normal. Decreased hearing noted. No mastoid tenderness. Tympanic membrane is bulging. Tympanic membrane is not injected or erythematous.      Nose: Septal deviation, mucosal edema, congestion and rhinorrhea present.      Right Turbinates: Enlarged, swollen and pale.      Left Turbinates: Enlarged, swollen and pale.      Right Sinus: Maxillary sinus tenderness present.      Left Sinus: Maxillary sinus tenderness present.    Eyes:      Extraocular Movements: Extraocular movements intact.      Conjunctiva/sclera: Conjunctivae normal.      Pupils: Pupils are equal, round, and reactive to light.   Cardiovascular:      Rate and Rhythm: Normal rate and regular rhythm.      Pulses: Normal pulses.      Heart sounds: Normal heart sounds.   Pulmonary:      Effort: Pulmonary effort is normal.      Breath sounds: Normal breath sounds.   Musculoskeletal:      Cervical back: Normal range of motion.   Lymphadenopathy:      Cervical: No cervical adenopathy.      Right cervical: No superficial cervical adenopathy.  Neurological:      General: No focal deficit present.      Mental Status: He is alert and oriented to person, place, and time.   Psychiatric:         Mood and Affect: Mood normal.         Behavior: Behavior normal.         Thought Content: Thought content normal.         Judgment: Judgment normal.        Reviewed:    Patient Active Problem List    Diagnosis    Lateral epicondylitis of right elbow    Rotator cuff impingement syndrome of right shoulder    Trigger middle finger of right hand    Family history of ulcerative colitis    Bilateral carpal tunnel syndrome    MATT (obstructive sleep apnea)    Family history of colon cancer    Fatty liver    Recurrent sinusitis    Allergic rhinitis due to animal (cat) (dog) hair and dander    Allergic rhinitis due to pollen      No Known Allergies     Social History     Socioeconomic History    Marital status:    Occupational History    Occupation: sales management   Tobacco Use    Smoking status: Never    Smokeless tobacco: Never   Vaping Use    Vaping Use: Never used   Substance and Sexual Activity    Alcohol use: Yes     Comment: 1 beer per month    Drug use: No   Other Topics Concern    Caffeine Concern Yes     Comment: soda    Stress Concern No    Weight Concern No    Special Diet No    Exercise Yes     Comment: rare    Seat Belt Yes      Review of Systems   Constitutional:  Positive for  fatigue. Negative for appetite change, chills and fever.   HENT:  Positive for congestion, ear pain, hearing loss, hoarse voice, postnasal drip, rhinorrhea, sinus pressure, sinus pain and voice change. Negative for ear discharge and sore throat.    Eyes: Negative.    Respiratory:  Negative for cough and shortness of breath.    Cardiovascular: Negative.    Gastrointestinal:  Negative for abdominal pain, diarrhea, nausea and vomiting.   Genitourinary: Negative.    Musculoskeletal:  Negative for neck pain.   Skin: Negative.    Allergic/Immunologic: Positive for environmental allergies. Negative for food allergies and immunocompromised state.   Neurological:  Positive for headaches.     All other systems negative unless otherwise stated in ROS or HPI above.       Teerse Jones MD  Internal Medicine       Call office with any questions or seek emergency care if necessary.   Patient understands and agrees to follow directions and advice.      ----------------------------------------- PATIENT INSTRUCTIONS-----------------------------------------     There are no Patient Instructions on file for this visit.

## 2024-01-12 ENCOUNTER — HOSPITAL ENCOUNTER (OUTPATIENT)
Dept: MRI IMAGING | Age: 49
Discharge: HOME | End: 2024-01-12
Attending: ORTHOPAEDIC SURGERY
Payer: COMMERCIAL

## 2024-01-12 DIAGNOSIS — G89.29 CHRONIC PAIN OF LEFT KNEE: ICD-10-CM

## 2024-01-12 DIAGNOSIS — M25.362 INSTABILITY OF LEFT KNEE JOINT: ICD-10-CM

## 2024-01-12 DIAGNOSIS — M25.562 CHRONIC PAIN OF LEFT KNEE: ICD-10-CM

## 2024-01-12 PROCEDURE — 73721 MRI JNT OF LWR EXTRE W/O DYE: CPT | Performed by: ORTHOPAEDIC SURGERY

## 2024-01-17 ENCOUNTER — OFFICE VISIT (OUTPATIENT)
Dept: ORTHOPEDICS CLINIC | Facility: CLINIC | Age: 49
End: 2024-01-17
Payer: COMMERCIAL

## 2024-01-17 ENCOUNTER — TELEPHONE (OUTPATIENT)
Dept: ORTHOPEDICS CLINIC | Facility: CLINIC | Age: 49
End: 2024-01-17

## 2024-01-17 VITALS — BODY MASS INDEX: 28.34 KG/M2 | WEIGHT: 187 LBS | HEIGHT: 68 IN

## 2024-01-17 DIAGNOSIS — M94.262 CHONDROMALACIA OF LEFT KNEE: ICD-10-CM

## 2024-01-17 DIAGNOSIS — M22.42 CHONDROMALACIA OF LEFT PATELLOFEMORAL JOINT: Primary | ICD-10-CM

## 2024-01-17 PROCEDURE — 3008F BODY MASS INDEX DOCD: CPT | Performed by: ORTHOPAEDIC SURGERY

## 2024-01-17 PROCEDURE — 99214 OFFICE O/P EST MOD 30 MIN: CPT | Performed by: ORTHOPAEDIC SURGERY

## 2024-01-18 ENCOUNTER — OFFICE VISIT (OUTPATIENT)
Dept: PHYSICAL THERAPY | Age: 49
End: 2024-01-18
Attending: ORTHOPAEDIC SURGERY
Payer: COMMERCIAL

## 2024-01-18 ENCOUNTER — APPOINTMENT (OUTPATIENT)
Dept: PHYSICAL THERAPY | Age: 49
End: 2024-01-18
Attending: ORTHOPAEDIC SURGERY
Payer: COMMERCIAL

## 2024-01-18 PROCEDURE — 97035 APP MDLTY 1+ULTRASOUND EA 15: CPT

## 2024-01-18 NOTE — PROGRESS NOTES
Diagnosis:   S/p left index finger A1 pulley release on 10/12/23   Referring Provider: Carlene  Date of Evaluation:    10/20/2023    Precautions:  None Next MD visit: as needed  Date of Surgery: n/a   Insurance Primary/Secondary: [employer] [place] PPO / N/A     # Auth Visits: 8 POC     Subjective: Has been having popping with left finger ROM, no locking episodes.  Current Pain: 0/10    Objective:   Palpation: minimal TTP along left hand    Left 2nd Digit MMT   Flexion: 5/5  Extension: 5/5  ABD 5/5  ADD 5/5      Strength: 81 lbs 79 lbs     Assessment: Therapeutic US utilized this session to address tissue mobility. Mild reduction in popping sensation following US.    Goals: (to be met in 8 visits)   Pt will improve L  strength to >30 lbs to be able to open a jar without difficulty Met  Pt will demonstrate improved be able to carry >10 # pain free   Pt will improve left index finger PIP flexion to >/= 80 deg Met  Pt will be independent and compliant with comprehensive HEP to maintain progress achieved in PT    Plan: Continue PT interventions to incorporate therapeutic US     Date: 11/30/2023  TX#: 8 Date:   12/5/2023  TX#: 9 Date:   12/7/2023  TX#: 10 Date:   12/12/2023  TX#: 11 Date:   12/14/2023  TX#: 12 Date:   1/4/2024  TX#: 13 Date:   1/18/2024  TX#: 14   TherEx:  -Left 2nd digit PIP, DIP towel  x20 reps  -Left 2nd digit flexion OP x20 reps  -Left hand tendon gliding x20 reps TherEx:  -Left 2nd digit PIP, DIP towel  x20 reps  -Left 2nd digit flexion OP x20 reps  -Left hand tendon gliding x20 reps  -Bicep curl, L, 5# 2x15 reps  -Wall ball press CW/CCW, L 2x15 reps  -Triceps pull down 15# 2x10 reps TherEx:  -Left 2nd digit PIP, DIP towel  x20 reps  -Left 2nd digit flexion OP x20 reps  -Left hand tendon gliding x20 reps  -Bicep curl, L, 5# 2x15 reps  -Wall ball press CW/CCW, L 2x15 reps  -Triceps pull down 15# 2x10 reps TherEx:  -Left 2nd digit PIP, DIP towel  x20 reps  -Left 2nd digit flexion OP x20  reps  -Left hand tendon gliding x20 reps  -Isometric thumb opposition 10x15 sec hold  -Isometric 2nd digit ABD 10x15 sec hold TherEx:  -Bicep curl, L, 10# 2x15 reps  -Triceps pull down 15# 2x10 reps  -Row 15# 2x10 reps  -Left 2nd digit PIP, DIP towel  x20 reps  -Left 2nd digit flexion OP x20 reps  -Left hand tendon gliding x20 reps  -Isometric thumb opposition 10x15 sec hold  -Isometric 2nd digit ABD 10x15 sec hold TherEx:  -Hand reassessment x5 min  -Gripping, L, x 5 reps, max effort -   Manual:  -2nd digit IASTM x15 min   Manual:  -2nd digit IASTM x15 min  -2nd digit MP, PIP, DIP gr III mob 10 min Manual:  -2nd digit IASTM x15 min  -2nd digit MP, PIP, DIP gr III mob 10 min Manual:  -2nd digit IASTM x15 min  -2nd digit MP, PIP, DIP gr III mob 10 min Manual:  -2nd digit IASTM x15 min  - -        Modalities:  -US cont., 3 MHz, 0.3 W/cm2, 15 min Modalities:  -US cont., 3 MHz, 0.3 W/cm2, 15 min     HEP: scar mobilization, left 2nd digit flexion OP, putty gripping (full fist, lumbrical ), tendon gliding    Charges: Ultrasound x1 (15 min)       Total Timed Treatment: 15 min  Total Treatment Time: 15 min

## 2024-01-23 ENCOUNTER — APPOINTMENT (OUTPATIENT)
Dept: PHYSICAL THERAPY | Age: 49
End: 2024-01-23
Attending: ORTHOPAEDIC SURGERY
Payer: COMMERCIAL

## 2024-01-24 ENCOUNTER — OFFICE VISIT (OUTPATIENT)
Dept: PHYSICAL THERAPY | Age: 49
End: 2024-01-24
Attending: ORTHOPAEDIC SURGERY
Payer: COMMERCIAL

## 2024-01-24 PROCEDURE — 97035 APP MDLTY 1+ULTRASOUND EA 15: CPT

## 2024-01-24 NOTE — PROGRESS NOTES
Diagnosis:   S/p left index finger A1 pulley release on 10/12/23   Referring Provider: Carlene  Date of Evaluation:    10/20/2023    Precautions:  None Next MD visit: as needed  Date of Surgery: n/a   Insurance Primary/Secondary: [employer] [place] PPO / N/A     # Auth Visits: 8 POC     Subjective: Minimal change since last session.  Current Pain: 0/10    Objective:   Palpation: minimal TTP along left hand    Hand AROM:  Open/Close Fist: clicking with left index finger flexion (no locking)    Assessment: Performed therapeutic US this session, temporary reduction in hand stiffness/clicking.    Goals: (to be met in 8 visits)   Pt will improve L  strength to >30 lbs to be able to open a jar without difficulty Met  Pt will demonstrate improved be able to carry >10 # pain free   Pt will improve left index finger PIP flexion to >/= 80 deg Met  Pt will be independent and compliant with comprehensive HEP to maintain progress achieved in PT    Plan: Continue PT interventions to incorporate therapeutic US     Date:   12/5/2023  TX#: 9 Date:   12/7/2023  TX#: 10 Date:   12/12/2023  TX#: 11 Date:   12/14/2023  TX#: 12 Date:   1/4/2024  TX#: 13 Date:   1/18/2024  TX#: 14 Date:   1/24/2024  TX#: 15   TherEx:  -Left 2nd digit PIP, DIP towel  x20 reps  -Left 2nd digit flexion OP x20 reps  -Left hand tendon gliding x20 reps  -Bicep curl, L, 5# 2x15 reps  -Wall ball press CW/CCW, L 2x15 reps  -Triceps pull down 15# 2x10 reps TherEx:  -Left 2nd digit PIP, DIP towel  x20 reps  -Left 2nd digit flexion OP x20 reps  -Left hand tendon gliding x20 reps  -Bicep curl, L, 5# 2x15 reps  -Wall ball press CW/CCW, L 2x15 reps  -Triceps pull down 15# 2x10 reps TherEx:  -Left 2nd digit PIP, DIP towel  x20 reps  -Left 2nd digit flexion OP x20 reps  -Left hand tendon gliding x20 reps  -Isometric thumb opposition 10x15 sec hold  -Isometric 2nd digit ABD 10x15 sec hold TherEx:  -Bicep curl, L, 10# 2x15 reps  -Triceps pull down 15# 2x10 reps  -Row 15#  2x10 reps  -Left 2nd digit PIP, DIP towel  x20 reps  -Left 2nd digit flexion OP x20 reps  -Left hand tendon gliding x20 reps  -Isometric thumb opposition 10x15 sec hold  -Isometric 2nd digit ABD 10x15 sec hold TherEx:  -Hand reassessment x5 min  -Gripping, L, x 5 reps, max effort -  -   Manual:  -2nd digit IASTM x15 min  -2nd digit MP, PIP, DIP gr III mob 10 min Manual:  -2nd digit IASTM x15 min  -2nd digit MP, PIP, DIP gr III mob 10 min Manual:  -2nd digit IASTM x15 min  -2nd digit MP, PIP, DIP gr III mob 10 min Manual:  -2nd digit IASTM x15 min  - -  -       Modalities:  -US cont., 3 MHz, 0.3 W/cm2, 15 min Modalities:  -US cont., 3 MHz, 0.3 W/cm2, 15 min  Modalities:  -US cont., 3 MHz, 0.3 W/cm2, 15 min     HEP: scar mobilization, left 2nd digit flexion OP, putty gripping (full fist, lumbrical ), tendon gliding    Charges: Ultrasound x1 (15 min)       Total Timed Treatment: 15 min  Total Treatment Time: 15 min

## 2024-01-25 ENCOUNTER — APPOINTMENT (OUTPATIENT)
Dept: PHYSICAL THERAPY | Age: 49
End: 2024-01-25
Attending: ORTHOPAEDIC SURGERY
Payer: COMMERCIAL

## 2024-01-29 ENCOUNTER — TELEPHONE (OUTPATIENT)
Dept: PHYSICAL THERAPY | Facility: HOSPITAL | Age: 49
End: 2024-01-29

## 2024-01-29 ENCOUNTER — APPOINTMENT (OUTPATIENT)
Dept: PHYSICAL THERAPY | Age: 49
End: 2024-01-29
Attending: ORTHOPAEDIC SURGERY
Payer: COMMERCIAL

## 2024-01-30 ENCOUNTER — APPOINTMENT (OUTPATIENT)
Dept: PHYSICAL THERAPY | Age: 49
End: 2024-01-30
Attending: ORTHOPAEDIC SURGERY
Payer: COMMERCIAL

## 2024-01-31 ENCOUNTER — APPOINTMENT (OUTPATIENT)
Dept: PHYSICAL THERAPY | Age: 49
End: 2024-01-31
Attending: ORTHOPAEDIC SURGERY
Payer: COMMERCIAL

## 2024-01-31 NOTE — PROGRESS NOTES
Dx: L LBP   Authorized # of Visits: 8 POC  20/yr Next MD Visit: as needed   Fall Risk: Standard  Precautions: None       Discharge Summary  Pt has attended 10 visits in Physical Therapy. Subjective: \"Did exercises this morning and was fine. \" States h >abdominal fatigue)  -Resisted trunk rotation, standing, red band, R/L, 2x15 reps, x10 reps TherEx:   -Quadruped posterior pelvic tilt into Child's pose, 10x5 sec hold  -Child's pose with arms L 2x30 sec, R 2x30 sec  -Side plank at wall, R/L 2x10 reps (kaitlin (sidelying), 20x5 sec hold     HEP: cat-camel to child's pose, resisted trunk rotation, bridge with march, abdominal alt LE march, side plank, back extension at counter, mini lunge    Charges:  TherEx x2 (30 min), Manual x1 (10 min)       Total Timed Treatm 2 seconds or less

## 2024-02-01 ENCOUNTER — APPOINTMENT (OUTPATIENT)
Dept: PHYSICAL THERAPY | Age: 49
End: 2024-02-01
Attending: ORTHOPAEDIC SURGERY
Payer: COMMERCIAL

## 2024-02-01 ENCOUNTER — OFFICE VISIT (OUTPATIENT)
Dept: INTERNAL MEDICINE CLINIC | Facility: CLINIC | Age: 49
End: 2024-02-01
Payer: COMMERCIAL

## 2024-02-01 VITALS
BODY MASS INDEX: 28.19 KG/M2 | TEMPERATURE: 99 F | HEIGHT: 68 IN | RESPIRATION RATE: 20 BRPM | SYSTOLIC BLOOD PRESSURE: 130 MMHG | OXYGEN SATURATION: 98 % | HEART RATE: 82 BPM | DIASTOLIC BLOOD PRESSURE: 76 MMHG | WEIGHT: 186 LBS

## 2024-02-01 DIAGNOSIS — H66.93 BILATERAL OTITIS MEDIA, UNSPECIFIED OTITIS MEDIA TYPE: Primary | ICD-10-CM

## 2024-02-01 DIAGNOSIS — H72.91 PERFORATION OF EAR DRUM, RIGHT: ICD-10-CM

## 2024-02-01 PROBLEM — M65.322 TRIGGER FINGER, LEFT INDEX FINGER: Status: ACTIVE | Noted: 2023-08-02

## 2024-02-01 PROBLEM — G56.01 RIGHT CARPAL TUNNEL SYNDROME: Status: ACTIVE | Noted: 2019-06-30

## 2024-02-01 PROCEDURE — 3075F SYST BP GE 130 - 139MM HG: CPT | Performed by: STUDENT IN AN ORGANIZED HEALTH CARE EDUCATION/TRAINING PROGRAM

## 2024-02-01 PROCEDURE — 3008F BODY MASS INDEX DOCD: CPT | Performed by: STUDENT IN AN ORGANIZED HEALTH CARE EDUCATION/TRAINING PROGRAM

## 2024-02-01 PROCEDURE — 3078F DIAST BP <80 MM HG: CPT | Performed by: STUDENT IN AN ORGANIZED HEALTH CARE EDUCATION/TRAINING PROGRAM

## 2024-02-01 PROCEDURE — 99213 OFFICE O/P EST LOW 20 MIN: CPT | Performed by: STUDENT IN AN ORGANIZED HEALTH CARE EDUCATION/TRAINING PROGRAM

## 2024-02-01 RX ORDER — CEFUROXIME AXETIL 250 MG/1
250 TABLET ORAL 2 TIMES DAILY
Qty: 20 TABLET | Refills: 0 | Status: SHIPPED | OUTPATIENT
Start: 2024-02-01

## 2024-02-01 NOTE — PROGRESS NOTES
OFFICE NOTE     Patient ID: Kelton Ba is a 48 year old male.  Today's Date: 02/01/24  Chief Complaint: Ear Pain (Was seen recently with sinus infection and ear infection symptoms seem to worsen. Stilling having post nasal drip, congestion,  ear pain, cough and fatigue x 2 weeks )    Ear Pain   Associated symptoms include coughing, hearing loss and rhinorrhea. Pertinent negatives include no abdominal pain, diarrhea, ear discharge, sore throat or vomiting.     Patient presents today with a complaints of pain in both ears.  He recently traveled to [place] on the airplane.  He traveled to [place] on Tuesday 1/38/2024 and came back yesterday.  He states that the symptoms began on Tuesday night.  During his flight back yesterday he experiences worsening in symptoms with a lot of ear pain, ear fullness and decreased hearing on both sides.  He heard that his right ear popped and he feels like he has a perforation now.  His left ear feels very full and is like about to burst very soon.  He also complains of severe postnasal drip.  He was recently treated about 3 weeks ago for ear infection with a course of antibiotic for 10 days and Medrol pack, which he states helped him a lot.      Vitals:    02/01/24 1015   BP: 130/76   Pulse: 82   Resp: 20   Temp: 98.6 °F (37 °C)   SpO2: 98%   Weight: 186 lb (84.4 kg)   Height: 5' 8\" (1.727 m)     body mass index is 28.28 kg/m².  BP Readings from Last 3 Encounters:   02/01/24 130/76   01/11/24 118/76   09/29/23 120/70     The 10-year ASCVD risk score (Brandon ASHTON, et al., 2019) is: 3%    Values used to calculate the score:      Age: 48 years      Sex: Male      Is Non- : No      Diabetic: No      Tobacco smoker: No      Systolic Blood Pressure: 130 mmHg      Is BP treated: No      HDL Cholesterol: 44 mg/dL      Total Cholesterol: 160 mg/dL      Medications reviewed:  Current Outpatient Medications   Medication Sig Dispense Refill     cefuroxime 250 MG Oral Tab Take 1 tablet (250 mg total) by mouth 2 (two) times daily. 20 tablet 0    Fluticasone Propionate 50 MCG/ACT Nasal Suspension 2 sprays by Each Nare route daily. 16 g 0    loratadine 10 MG Oral Tab Take 1 tablet (10 mg total) by mouth as needed.           Assessment & Plan    1. Bilateral otitis media, unspecified otitis media type (Primary)  Patient's symptoms consistent with bilateral otitis media with perforation of the eardrum on the right.  His left ear tympanic membrane appears inflamed and bulging with fluid accumulation behind the tympanic membrane.  Patient will benefit from the course of antibiotic.  He also needs to see an ENT for endoscopic procedure to evaluate his eustachian tubes.  -     Cefuroxime Axetil; Take 1 tablet (250 mg total) by mouth 2 (two) times daily.  Dispense: 20 tablet; Refill: 0    2. Perforation of ear drum, right  -     ENT Referral - In Network        Follow Up: As needed/if symptoms worsen or No follow-ups on file..         Objective/ Results:   Physical Exam  Constitutional:       General: He is not in acute distress.     Appearance: Normal appearance. He is normal weight. He is not ill-appearing, toxic-appearing or diaphoretic.   HENT:      Head: Normocephalic and atraumatic.      Right Ear: External ear normal. Decreased hearing noted. No mastoid tenderness. No hemotympanum. Tympanic membrane is perforated. Tympanic membrane is not injected.      Left Ear: Decreased hearing noted. No mastoid tenderness. Tympanic membrane is erythematous and bulging.      Nose: Congestion present. No rhinorrhea.      Mouth/Throat:      Pharynx: No oropharyngeal exudate or posterior oropharyngeal erythema.   Neurological:      Mental Status: He is alert.        Reviewed:    Patient Active Problem List    Diagnosis    Trigger finger, left index finger    Lateral epicondylitis of right elbow    Rotator cuff impingement syndrome of right shoulder    Trigger middle finger of  right hand    Family history of ulcerative colitis    Bilateral carpal tunnel syndrome    Right carpal tunnel syndrome    MATT (obstructive sleep apnea)    Family history of colon cancer    Fatty liver    Recurrent sinusitis    Allergic rhinitis due to animal (cat) (dog) hair and dander    Allergic rhinitis due to pollen      No Known Allergies     Social History     Socioeconomic History    Marital status:    Occupational History    Occupation: [employer] management   Tobacco Use    Smoking status: Never    Smokeless tobacco: Never   Vaping Use    Vaping Use: Never used   Substance and Sexual Activity    Alcohol use: Yes     Comment: 1 beer per month    Drug use: No   Other Topics Concern    Caffeine Concern Yes     Comment: soda    Stress Concern No    Weight Concern No    Special Diet No    Exercise Yes     Comment: rare    Seat Belt Yes      Review of Systems   Constitutional:  Positive for activity change, appetite change and fatigue. Negative for chills and fever.   HENT:  Positive for ear pain, hearing loss, postnasal drip and rhinorrhea. Negative for ear discharge, nosebleeds, sinus pressure, sinus pain and sore throat.    Eyes: Negative.    Respiratory:  Positive for cough.    Gastrointestinal:  Negative for abdominal pain, constipation, diarrhea, nausea and vomiting.     All other systems negative unless otherwise stated in ROS or HPI above.       Terese Jones MD  Internal Medicine       Call office with any questions or seek emergency care if necessary.   Patient understands and agrees to follow directions and advice.      ----------------------------------------- PATIENT INSTRUCTIONS-----------------------------------------     Patient Instructions        - Please use spay with active ingredient oxymetazoline 30 min prior to your flight.

## 2024-02-05 ENCOUNTER — APPOINTMENT (OUTPATIENT)
Dept: PHYSICAL THERAPY | Age: 49
End: 2024-02-05
Attending: ORTHOPAEDIC SURGERY
Payer: COMMERCIAL

## 2024-02-09 ENCOUNTER — APPOINTMENT (OUTPATIENT)
Dept: PHYSICAL THERAPY | Age: 49
End: 2024-02-09
Attending: INTERNAL MEDICINE
Payer: COMMERCIAL

## 2024-02-09 ENCOUNTER — TELEPHONE (OUTPATIENT)
Dept: PHYSICAL THERAPY | Facility: HOSPITAL | Age: 49
End: 2024-02-09

## 2024-02-12 ENCOUNTER — OFFICE VISIT (OUTPATIENT)
Dept: INTERNAL MEDICINE CLINIC | Facility: CLINIC | Age: 49
End: 2024-02-12
Payer: COMMERCIAL

## 2024-02-12 ENCOUNTER — TELEPHONE (OUTPATIENT)
Dept: INTERNAL MEDICINE CLINIC | Facility: CLINIC | Age: 49
End: 2024-02-12

## 2024-02-12 VITALS
BODY MASS INDEX: 29.37 KG/M2 | RESPIRATION RATE: 14 BRPM | WEIGHT: 193.81 LBS | HEART RATE: 76 BPM | HEIGHT: 68 IN | SYSTOLIC BLOOD PRESSURE: 108 MMHG | TEMPERATURE: 97 F | DIASTOLIC BLOOD PRESSURE: 62 MMHG | OXYGEN SATURATION: 98 %

## 2024-02-12 DIAGNOSIS — H66.93 BILATERAL OTITIS MEDIA, UNSPECIFIED OTITIS MEDIA TYPE: ICD-10-CM

## 2024-02-12 PROCEDURE — 99213 OFFICE O/P EST LOW 20 MIN: CPT | Performed by: NURSE PRACTITIONER

## 2024-02-12 PROCEDURE — 3008F BODY MASS INDEX DOCD: CPT | Performed by: NURSE PRACTITIONER

## 2024-02-12 PROCEDURE — 3078F DIAST BP <80 MM HG: CPT | Performed by: NURSE PRACTITIONER

## 2024-02-12 PROCEDURE — 3074F SYST BP LT 130 MM HG: CPT | Performed by: NURSE PRACTITIONER

## 2024-02-12 RX ORDER — CEFUROXIME AXETIL 250 MG/1
250 TABLET ORAL 2 TIMES DAILY
Qty: 20 TABLET | Refills: 0 | Status: SHIPPED | OUTPATIENT
Start: 2024-02-12

## 2024-02-12 NOTE — TELEPHONE ENCOUNTER
Faxed over release of medical records to Dr. Dudley's office. [phone]. Fax confirmation received.

## 2024-02-12 NOTE — PROGRESS NOTES
Kelton Ba is a 48 year old male.  CHIEF COMPLAINT   Ear infection    HPI:   Had a recent ear infection with perforation to the right ear. Did the abt. Feels much better. Wants it evaluated before going on a plane next week.    Current Outpatient Medications   Medication Sig Dispense Refill    cefuroxime 250 MG Oral Tab Take 1 tablet (250 mg total) by mouth 2 (two) times daily. 20 tablet 0    Fluticasone Propionate 50 MCG/ACT Nasal Suspension 2 sprays by Each Nare route daily. 16 g 0    loratadine 10 MG Oral Tab Take 1 tablet (10 mg total) by mouth as needed.        Past Medical History:   Diagnosis Date    Allergic rhinitis     Carpal tunnel syndrome     CPAP (continuous positive airway pressure) dependence     ETD (eustachian tube dysfunction)     Migraine     Neuropathy     numbness and tingling right hand, ulnar issues    MATT (obstructive sleep apnea)     cpap 10?    MATT (obstructive sleep apnea)     Otalgia of left ear 11/2011    Persistent disorder of initiating or maintaining wakefulness     Ulnar nerve abnormality     Unspecified sleep apnea     URI (upper respiratory infection) 11/2011    Visual impairment     wears glasses for distance      Social History:  Social History     Socioeconomic History    Marital status:    Occupational History    Occupation: [employer]   Tobacco Use    Smoking status: Never    Smokeless tobacco: Never   Vaping Use    Vaping Use: Never used   Substance and Sexual Activity    Alcohol use: Yes     Comment: 1 beer per month    Drug use: No   Other Topics Concern    Caffeine Concern Yes     Comment: soda    Stress Concern No    Weight Concern No    Special Diet No    Exercise Yes     Comment: rare    Seat Belt Yes        REVIEW OF SYSTEMS:   See HPI     EXAM:     /62 (BP Location: Right arm, Patient Position: Sitting, Cuff Size: adult)   Pulse 76   Temp 97.2 °F (36.2 °C) (Temporal)   Resp 14   Ht 5' 8\" (1.727 m)   Wt 193 lb 12.8 oz (87.9 kg)   SpO2  98%   BMI 29.47 kg/m²   Body mass index is 29.47 kg/m².   GENERAL: well developed, well nourished,in no apparent distress  HEENT: atraumatic, normocephalic, left ear is clear, right ear with cerumen- was removed and there is slight erythema to the top of the canal and the proximal end of the eardrum with no perforation noted.  EYES: conjunctiva are clear  NECK: supple,no adenopathy    LUNGS: clear to auscultation; no rhonchi, rales, or wheezing  CARDIO: RRR without murmur  GI: No tenderness  MUSCULOSKELETAL: Normal gait.  EXTREMITIES: no edema  NEURO: Oriented times three    LABS:      Lab Results   Component Value Date    WBC 8.4 10/21/2023    RBC 4.83 04/03/2023    HGB 15.2 10/21/2023    HCT 43.9 10/21/2023    MCV 88 10/21/2023    MCH 30.2 04/03/2023    MCHC 34.9 04/03/2023    RDW 12.7 04/03/2023     10/21/2023      Lab Results   Component Value Date    GLU 84 10/21/2023    BUN 13 10/21/2023    BUNCREA 16.0 07/28/2021    CREATSERUM 0.94 10/21/2023    ANIONGAP 5 04/03/2023    GFR 81 06/30/2017    GFRNAA 99 10/21/2023    GFRAA 102 07/28/2021    CA 9.3 10/21/2023    OSMOCALC 293 04/03/2023    ALKPHO 105 04/03/2023    AST 27 10/21/2023    ALT 51 10/21/2023    BILT 0.4 10/21/2023    TP 6.7 10/21/2023    ALB 3.6 04/03/2023    GLOBULIN 3.4 04/03/2023    AGRATIO 2.0 08/30/2013     04/03/2023    K 4.3 10/21/2023     10/21/2023    CO2 25.0 04/03/2023      Lab Results   Component Value Date    CHOLEST 160 10/21/2023    TRIG 121 10/21/2023    HDL 44 10/21/2023    LDL 80 07/28/2021    VLDL 31 (H) 07/28/2021    TCHDLRATIO 3.40 10/04/2022    NONHDLC 113 07/28/2021      Lab Results   Component Value Date    TSH 1.500 10/21/2023      Lab Results   Component Value Date     07/28/2021    A1C 5.7 10/21/2023        ASSESSMENT AND PLAN:   1. Bilateral otitis media, unspecified otitis media type  -The patient had a recent ear infection and was treated with antibiotics.  This was his second 1 within a month's  time or so.  Is feeling much better with no ear pain currently.  His left TM is clear, his right TM does show still some small erythema locally to the top of his canal and eardrum.  No perforation present.  -Advised to do 3 more days of the antibiotic then monitor closely.  -Advised to continue the Flonase and Claritin  -See ENT as planned.  - cefuroxime 250 MG Oral Tab; Take 1 tablet (250 mg total) by mouth 2 (two) times daily.  Dispense: 20 tablet; Refill: 0      The patient indicates understanding of these issues and agrees to the plan.    The patient is asked to return as needed or when routine care is due.

## 2024-02-12 NOTE — PATIENT INSTRUCTIONS
Get your labs done in a couple weeks. You can fast for 8 hours. Drink water. No alcohol, tylenol or high fat diet the couple days before.    Take antibiotic for three more days. Then restart as needed if symptoms return    Take antibiotic with food    Eat yogurt twice a day while on antibiotic or take an oral probiotic    Monitor for diarrhea, side effects, allergic reaction    If you have a mild allergic reaction take benadryl and call the office. If it is severe and you have lip or throat swelling or trouble breathing go to the ER or call 911    Do the flonase and ray.     Follow up in one week as needed or when routine care is due

## 2024-02-22 ENCOUNTER — OFFICE VISIT (OUTPATIENT)
Dept: PHYSICAL THERAPY | Age: 49
End: 2024-02-22
Attending: INTERNAL MEDICINE
Payer: COMMERCIAL

## 2024-02-22 PROCEDURE — 97110 THERAPEUTIC EXERCISES: CPT

## 2024-02-22 PROCEDURE — 97035 APP MDLTY 1+ULTRASOUND EA 15: CPT

## 2024-02-22 NOTE — PROGRESS NOTES
Diagnosis:   S/p left index finger A1 pulley release on 10/12/23   Referring Provider: No ref. provider found  Date of Evaluation:    10/20/2023    Precautions:  None Next MD visit: as needed  Date of Surgery: n/a   Insurance Primary/Secondary: [employer] PPO / N/A     # Auth Visits: 8 POC     Subjective:   Current Pain: 0/10. Doing exercise inconsistently as he doesn't want to irritate L hand.     Objective:   4+ mos post op   R = 79.4#  L=76.6#  Palpation: no TTP along 2nd digit. Palpable clicking with PIP flexion /ext     Hand AROM:  Open/Close Fist: clicking with left index finger flexion (no locking)    Assessment: Performed therapeutic US this session, temporary reduction in hand stiffness/clicking.    Goals: (to be met in 8 visits)   Pt will improve L  strength to >30 lbs to be able to open a jar without difficulty Met  Pt will demonstrate improved be able to carry >10 # pain free   Pt will improve left index finger PIP flexion to >/= 80 deg Met  Pt will be independent and compliant with comprehensive HEP to maintain progress achieved in PT    Plan: Continue PT interventions to incorporate therapeutic US . Cont 2/28, 3/13 .     Date:   12/5/2023  TX#: 9 Date:   12/7/2023  TX#: 10 Date:   12/12/2023  TX#: 11 Date:   12/14/2023  TX#: 12 Date:   1/4/2024  TX#: 13 Date:   1/18/2024  TX#: 14 Date:   1/24/2024  TX#: 15 2/22/2024   16   TherEx:  -Left 2nd digit PIP, DIP towel  x20 reps  -Left 2nd digit flexion OP x20 reps  -Left hand tendon gliding x20 reps  -Bicep curl, L, 5# 2x15 reps  -Wall ball press CW/CCW, L 2x15 reps  -Triceps pull down 15# 2x10 reps TherEx:  -Left 2nd digit PIP, DIP towel  x20 reps  -Left 2nd digit flexion OP x20 reps  -Left hand tendon gliding x20 reps  -Bicep curl, L, 5# 2x15 reps  -Wall ball press CW/CCW, L 2x15 reps  -Triceps pull down 15# 2x10 reps TherEx:  -Left 2nd digit PIP, DIP towel  x20 reps  -Left 2nd digit flexion OP x20 reps  -Left hand tendon gliding x20  reps  -Isometric thumb opposition 10x15 sec hold  -Isometric 2nd digit ABD 10x15 sec hold TherEx:  -Bicep curl, L, 10# 2x15 reps  -Triceps pull down 15# 2x10 reps  -Row 15# 2x10 reps  -Left 2nd digit PIP, DIP towel  x20 reps  -Left 2nd digit flexion OP x20 reps  -Left hand tendon gliding x20 reps  -Isometric thumb opposition 10x15 sec hold  -Isometric 2nd digit ABD 10x15 sec hold TherEx:  -Hand reassessment x5 min  -Gripping, L, x 5 reps, max effort -  - Modalities:  -US cont., 3 MHz, 0.3 W/cm2, 15 min      Exercise: 8 mins  Tendon glides  TherEx:  -Left 2nd digit PIP, DIP towel  x20 reps  -Left 2nd digit flexion OP x20 reps  -Left hand tendon gliding x20 reps  -Isometric thumb opposition 10x15 sec hold  -Isometric 2nd digit ABD 10x15 sec hold    -STM 4 mins to L incision   Manual:  -2nd digit IASTM x15 min  -2nd digit MP, PIP, DIP gr III mob 10 min Manual:  -2nd digit IASTM x15 min  -2nd digit MP, PIP, DIP gr III mob 10 min Manual:  -2nd digit IASTM x15 min  -2nd digit MP, PIP, DIP gr III mob 10 min Manual:  -2nd digit IASTM x15 min  - -  -        Modalities:  -US cont., 3 MHz, 0.3 W/cm2, 15 min Modalities:  -US cont., 3 MHz, 0.3 W/cm2, 15 min  Modalities:  -US cont., 3 MHz, 0.3 W/cm2, 15 min      HEP: scar mobilization, left 2nd digit flexion OP, putty gripping (full fist, lumbrical ), tendon gliding    Charges: Ultrasound x1 (15 min) , ex ( 8 mins)       Total Timed Treatment:   23 min  Total Treatment Time:  27  min

## 2024-02-28 ENCOUNTER — OFFICE VISIT (OUTPATIENT)
Dept: PHYSICAL THERAPY | Age: 49
End: 2024-02-28
Attending: INTERNAL MEDICINE
Payer: COMMERCIAL

## 2024-02-28 PROCEDURE — 97035 APP MDLTY 1+ULTRASOUND EA 15: CPT

## 2024-02-28 PROCEDURE — 97110 THERAPEUTIC EXERCISES: CPT

## 2024-02-28 NOTE — PROGRESS NOTES
Diagnosis:   S/p left index finger A1 pulley release on 10/12/23   Referring Provider: No ref. provider found  Date of Evaluation:    10/20/2023    Precautions:  None Next MD visit: as needed  Date of Surgery: n/a   Insurance Primary/Secondary: [employer] PPO / N/A     # Auth Visits: 14 POC      Progress Summary  Pt has attended 17 visits in Physical Therapy.     Subjective: Still having clicking in hands, noticing more stiffness clicking in R 2nd digit and L  2nd and 3rd digits. Is planning to schedule follow-up with hand specialist. Started 2 Aleve twice daily last week for his knee, didn't notice any difference in knee, so stopped that regiment. Has started training hikes without knee buckling or worsening symptoms. Able to perform exercises intermittently.   Current Pain: 0/10.      Objective:   R = 79.4#  L=76.6#  Palpation: no TTP along 2nd digit. Palpable clicking with PIP flexion /ext     AROM left HAND:    IF   MP 0 - 90   PIP 0 - 90   DIP 0 - 55     Left 2nd Digit MMT   Flexion: 5/5  Extension: 5/5  ABD 5/5  ADD 5/5     Hand AROM:  Open/Close Fist: clicking with left index finger flexion (no locking)    Assessment: Improved left index finger incision and mobility, but still with intermittent clicking. Recent gap in care due to schedule conflicts. Will resume more consistent sessions for therapeutic ultrasound to improve tissue mobility and assess symptom response. If no change, recommend follow-up with hand surgeon.     Goals: (to be met in 8 visits)   Pt will improve L  strength to >30 lbs to be able to open a jar without difficulty Met  Pt will demonstrate improved be able to carry >10 # pain free Met  Pt will improve left index finger PIP flexion to >/= 80 deg Met  Pt will be independent and compliant with comprehensive HEP to maintain progress achieved in PT   Pt will report <5 clicking events over course of day      Plan: Continue skilled Physical Therapy for 6 additional visits over a 90 day  period. Treatment will include: Therapeutic Ultrasound, TherEx, Manual, TherAc as needed.        Patient was advised of these findings, precautions, and treatment options and has agreed to actively participate in planning and for this course of care.    Thank you for your referral. If you have any questions, please contact me at Dept: [phone].    Sincerely,  Electronically signed by therapist: Sherif Mendoza PT, DPT        Date:   12/14/2023  TX#: 12 Date:   1/4/2024  TX#: 13 Date:   1/18/2024  TX#: 14 Date:   1/24/2024  TX#: 15 2/22/2024   16 Date:   2/28/2024  TX#: 17   TherEx:  -Bicep curl, L, 10# 2x15 reps  -Triceps pull down 15# 2x10 reps  -Row 15# 2x10 reps  -Left 2nd digit PIP, DIP towel  x20 reps  -Left 2nd digit flexion OP x20 reps  -Left hand tendon gliding x20 reps  -Isometric thumb opposition 10x15 sec hold  -Isometric 2nd digit ABD 10x15 sec hold TherEx:  -Hand reassessment x5 min  -Gripping, L, x 5 reps, max effort -  - Modalities:  -US cont., 3 MHz, 0.3 W/cm2, 15 min      Exercise: 8 mins  Tendon glides  TherEx:  -Left 2nd digit PIP, DIP towel  x20 reps  -Left 2nd digit flexion OP x20 reps  -Left hand tendon gliding x20 reps  -Isometric thumb opposition 10x15 sec hold  -Isometric 2nd digit ABD 10x15 sec hold    -STM 4 mins to L incision TherEx:  -Left 2nd digit PIP, DIP towel  x20 reps  -Left 2nd digit flexion OP x20 reps  -Left hand tendon gliding x20 reps  -Isometric thumb opposition 10x15 sec hold  -Isometric 2nd digit ABD 10x15 sec hold   Manual:  -2nd digit IASTM x15 min  - -  -   -    Modalities:  -US cont., 3 MHz, 0.3 W/cm2, 15 min Modalities:  -US cont., 3 MHz, 0.3 W/cm2, 15 min  Modalities:  -US cont., 3 MHz, 0.3 W/cm2, 15 min  Modalities:  -US cont., 3 MHz, 0.3 W/cm2, 15 min     HEP: scar mobilization, left 2nd digit flexion OP, putty gripping (full fist, lumbrical ), tendon gliding    Charges: Ultrasound x1 (15 min) , TherEx x1 ( 8 mins)       Total Timed Treatment:   23  min  Total Treatment Time:  27  min

## 2024-03-06 ENCOUNTER — OFFICE VISIT (OUTPATIENT)
Dept: PHYSICAL THERAPY | Age: 49
End: 2024-03-06
Attending: INTERNAL MEDICINE
Payer: COMMERCIAL

## 2024-03-06 ENCOUNTER — MED REC SCAN ONLY (OUTPATIENT)
Dept: INTERNAL MEDICINE CLINIC | Facility: CLINIC | Age: 49
End: 2024-03-06

## 2024-03-06 PROCEDURE — 97035 APP MDLTY 1+ULTRASOUND EA 15: CPT

## 2024-03-06 NOTE — PROGRESS NOTES
Diagnosis:   S/p left index finger A1 pulley release on 10/12/23   Referring Provider: No ref. provider found  Date of Evaluation:    10/20/2023    Precautions:  None Next MD visit: as needed  Date of Surgery: n/a   Insurance Primary/Secondary: [employer] / N/A     # Auth Visits: 14 POC     Subjective: Not having the popping/clicking as frequent in the left hand. Wore Cal [employer] compression gloves last night, may have helped with swelling sensation.   Current Pain: 0/10.      Objective:   R = 79.4#  L=76.6#  Palpation: no TTP along 2nd digit. Palpable clicking with PIP flexion /ext     AROM left HAND:    IF   MP 0 - 90   PIP 0 - 90   DIP 0 - 55     Left 2nd Digit MMT   Flexion: 5/5  Extension: 5/5  ABD 5/5  ADD 5/5     Hand AROM:  Open/Close Fist: no clicking with left index finger flexion (no locking)    Assessment: No clicking with left hand open/closed fist motion. Continued therapeutic ultrasound, appears to have positive changes since last session.     Goals: (to be met in 8 visits)   Pt will improve L  strength to >30 lbs to be able to open a jar without difficulty Met  Pt will demonstrate improved be able to carry >10 # pain free Met  Pt will improve left index finger PIP flexion to >/= 80 deg Met  Pt will be independent and compliant with comprehensive HEP to maintain progress achieved in PT   Pt will report <5 clicking events over course of day      Plan: Continue skilled Physical Therapy for 6 additional visits over a 90 day period. Treatment will include: Therapeutic Ultrasound, TherEx, Manual, TherAc as needed.        Patient was advised of these findings, precautions, and treatment options and has agreed to actively participate in planning and for this course of care.    Thank you for your referral. If you have any questions, please contact me at Dept: [phone].    Sincerely,  Electronically signed by therapist: Sherif Mendoza, PT, DPT        Date:   1/4/2024  TX#: 13 Date:    1/18/2024  TX#: 14 Date:   1/24/2024  TX#: 15 2/22/2024   16 Date:   2/28/2024  TX#: 17 Date:   3/6/2024  TX#: 18   TherEx:  -Hand reassessment x5 min  -Gripping, L, x 5 reps, max effort -  - Modalities:  -US cont., 3 MHz, 0.3 W/cm2, 15 min      Exercise: 8 mins  Tendon glides  TherEx:  -Left 2nd digit PIP, DIP towel  x20 reps  -Left 2nd digit flexion OP x20 reps  -Left hand tendon gliding x20 reps  -Isometric thumb opposition 10x15 sec hold  -Isometric 2nd digit ABD 10x15 sec hold    -STM 4 mins to L incision TherEx:  -Left 2nd digit PIP, DIP towel  x20 reps  -Left 2nd digit flexion OP x20 reps  -Left hand tendon gliding x20 reps  -Isometric thumb opposition 10x15 sec hold  -Isometric 2nd digit ABD 10x15 sec hold  -   Modalities:  -US cont., 3 MHz, 0.3 W/cm2, 15 min Modalities:  -US cont., 3 MHz, 0.3 W/cm2, 15 min  Modalities:  -US cont., 3 MHz, 0.3 W/cm2, 15 min  Modalities:  -US cont., 3 MHz, 0.3 W/cm2, 15 min Modalities:  -US cont., 3 MHz, 0.3 W/cm2, 15 min     HEP: scar mobilization, left 2nd digit flexion OP, putty gripping (full fist, lumbrical ), tendon gliding    Charges: Ultrasound x1 (15 min)         Total Timed Treatment:   15 min  Total Treatment Time:  15  min

## 2024-03-09 ENCOUNTER — OFFICE VISIT (OUTPATIENT)
Dept: INTERNAL MEDICINE CLINIC | Facility: CLINIC | Age: 49
End: 2024-03-09
Payer: COMMERCIAL

## 2024-03-09 VITALS
BODY MASS INDEX: 29.16 KG/M2 | TEMPERATURE: 97 F | HEIGHT: 68 IN | SYSTOLIC BLOOD PRESSURE: 116 MMHG | WEIGHT: 192.38 LBS | DIASTOLIC BLOOD PRESSURE: 70 MMHG | HEART RATE: 71 BPM | RESPIRATION RATE: 16 BRPM | OXYGEN SATURATION: 98 %

## 2024-03-09 DIAGNOSIS — B34.9 VIRAL SYNDROME: Primary | ICD-10-CM

## 2024-03-09 DIAGNOSIS — J01.00 ACUTE NON-RECURRENT MAXILLARY SINUSITIS: ICD-10-CM

## 2024-03-09 PROCEDURE — 3078F DIAST BP <80 MM HG: CPT | Performed by: NURSE PRACTITIONER

## 2024-03-09 PROCEDURE — 87637 SARSCOV2&INF A&B&RSV AMP PRB: CPT | Performed by: NURSE PRACTITIONER

## 2024-03-09 PROCEDURE — 99214 OFFICE O/P EST MOD 30 MIN: CPT | Performed by: NURSE PRACTITIONER

## 2024-03-09 PROCEDURE — 3008F BODY MASS INDEX DOCD: CPT | Performed by: NURSE PRACTITIONER

## 2024-03-09 PROCEDURE — 3074F SYST BP LT 130 MM HG: CPT | Performed by: NURSE PRACTITIONER

## 2024-03-09 RX ORDER — AMOXICILLIN AND CLAVULANATE POTASSIUM 875; 125 MG/1; MG/1
1 TABLET, FILM COATED ORAL 2 TIMES DAILY
Qty: 20 TABLET | Refills: 0 | Status: SHIPPED | OUTPATIENT
Start: 2024-03-09 | End: 2024-03-19

## 2024-03-09 NOTE — PROGRESS NOTES
Kelton Ba is a 48 year old male.  CHIEF COMPLAINT   Viral syndrome    HPI:   The patient symptoms started on Thursday with bodyaches, sinus congestion and drainage, cough that is productive, mild shortness of breath at times with exertion that resolves quickly, fatigue, chills, sweating.  He also has poor appetite.    No shortness of breath at rest, rashes, diarrhea, n/v, rashes, sore throat, loss of smell or taste.  He denies ear pain.    He has not tested for COVID yet.    Current Outpatient Medications   Medication Sig Dispense Refill    Fluticasone Propionate 50 MCG/ACT Nasal Suspension 2 sprays by Each Nare route daily. 16 g 0    loratadine 10 MG Oral Tab Take 1 tablet (10 mg total) by mouth as needed.        Past Medical History:   Diagnosis Date    Allergic rhinitis     Carpal tunnel syndrome     CPAP (continuous positive airway pressure) dependence     ETD (eustachian tube dysfunction)     Migraine     Neuropathy     numbness and tingling right hand, ulnar issues    MATT (obstructive sleep apnea)     cpap 10?    MATT (obstructive sleep apnea)     Otalgia of left ear 11/2011    Persistent disorder of initiating or maintaining wakefulness     Ulnar nerve abnormality     Unspecified sleep apnea     URI (upper respiratory infection) 11/2011    Visual impairment     wears glasses for distance      Social History:  Social History     Socioeconomic History    Marital status:    Occupational History    Occupation: sales management   Tobacco Use    Smoking status: Never     Passive exposure: Never    Smokeless tobacco: Never   Vaping Use    Vaping Use: Never used   Substance and Sexual Activity    Alcohol use: Yes     Comment: 1 beer per month    Drug use: No   Other Topics Concern    Caffeine Concern Yes     Comment: soda    Stress Concern No    Weight Concern No    Special Diet No    Exercise Yes     Comment: rare    Seat Belt Yes        REVIEW OF SYSTEMS:   See HPI     EXAM:     /70 (BP  Location: Left arm, Patient Position: Sitting, Cuff Size: adult)   Pulse 71   Temp 97.4 °F (36.3 °C) (Temporal)   Resp 16   Ht 5' 8\" (1.727 m)   Wt 192 lb 6.4 oz (87.3 kg)   SpO2 98%   BMI 29.25 kg/m²   Body mass index is 29.25 kg/m².   GENERAL: well developed, well nourished,in no apparent distress  HEENT: atraumatic, normocephalic, right ear is clear, left ear with bulging and erythemic TM, sinus pressure with palpation but no pain  EYES: conjunctiva are clear  NECK: supple,no adenopathy    LUNGS: clear to auscultation; no rhonchi, rales, or wheezing  CARDIO: RRR without murmur  GI: no tenderness  MUSCULOSKELETAL: Normal gait.  EXTREMITIES: no edema  NEURO: Oriented times three       LABS:      Lab Results   Component Value Date    WBC 8.4 10/21/2023    RBC 4.83 04/03/2023    HGB 15.2 10/21/2023    HCT 43.9 10/21/2023    MCV 88 10/21/2023    MCH 30.2 04/03/2023    MCHC 34.9 04/03/2023    RDW 12.7 04/03/2023     10/21/2023      Lab Results   Component Value Date    GLU 84 10/21/2023    BUN 13 10/21/2023    BUNCREA 16.0 07/28/2021    CREATSERUM 0.94 10/21/2023    ANIONGAP 5 04/03/2023    GFR 81 06/30/2017    GFRNAA 99 10/21/2023    GFRAA 102 07/28/2021    CA 9.3 10/21/2023    OSMOCALC 293 04/03/2023    ALKPHO 105 04/03/2023    AST 27 10/21/2023    ALT 51 10/21/2023    BILT 0.4 10/21/2023    TP 6.7 10/21/2023    ALB 3.6 04/03/2023    GLOBULIN 3.4 04/03/2023    AGRATIO 2.0 08/30/2013     04/03/2023    K 4.3 10/21/2023     10/21/2023    CO2 25.0 04/03/2023      Lab Results   Component Value Date    CHOLEST 160 10/21/2023    TRIG 121 10/21/2023    HDL 44 10/21/2023    LDL 80 07/28/2021    VLDL 31 (H) 07/28/2021    TCHDLRATIO 3.40 10/04/2022    NONHDLC 113 07/28/2021      Lab Results   Component Value Date    TSH 1.500 10/21/2023      Lab Results   Component Value Date     07/28/2021    A1C 5.7 10/21/2023          ASSESSMENT AND PLAN:   1. Viral syndrome  -The patient has viral syndrome.   No emergent symptoms but does have mild shortness of breath with exertion only that resolves quickly.  -Advised on supportive care  -Flu, COVID, RSV swab sent.  -Isolation in the event it is flu or COVID discussed  -Medication such as Tamiflu or Paxlovid discussed if either are positive.  -To ER for emergent symptoms as needed  - SARS-CoV-2/Flu A and B/RSV by PCR ([place]) [E]; Future    2. Acute non-recurrent maxillary sinusitis  -The patient has sinus pressure with productive draining and cough frequently.  Is still likely viral but was advised if his per symptoms persist longer than 7 days to start the antibiotic that was ordered.  - amoxicillin clavulanate 875-125 MG Oral Tab; Take 1 tablet by mouth 2 (two) times daily for 10 days.  Dispense: 20 tablet; Refill: 0      The patient indicates understanding of these issues and agrees to the plan.  The patient is asked to return as needed or when routine care is due.

## 2024-03-09 NOTE — PATIENT INSTRUCTIONS
Isolate until your results are obtained.  Then per CDC guidelines.    Keep well hydrated     You can use robitussin DM or mucinex DM as directed on the bottle for cough and chest congestion.      Use cepacol for sore throat and dry cough as needed.     Continue Flonase     Use tylenol as needed for pain or fever    Do deep breathing exercises    Take antibiotic completely as ordered if your symptoms persist longer than 7 days.    Take antibiotic with food    Eat yogurt twice a day while on antibiotic or take an oral probiotic    Monitor for diarrhea, side effects, allergic reaction    If you have a mild allergic reaction take benadryl and call the office. If it is severe and you have lip or throat swelling or trouble breathing go to the ER or call 911     Follow up as needed or when routine care is due  Viral Upper Respiratory Illness (Adult)    You have a viral upper respiratory illness (URI), which is another term for the common cold. This illness is contagious during the first few days. It is spread through the air by coughing and sneezing. It may also be spread by direct contact (touching the sick person and then touching your own eyes, nose, or mouth). Frequent handwashing will decrease risk of spread. Most viral illnesses go away within 7 to 10 days with rest and simple home remedies. Sometimes the illness may last for several weeks. Antibiotics will not kill a virus, and they are generally not prescribed for this condition.  Home care  If symptoms are severe, rest at home for the first 2 to 3 days. When you resume activity, don't let yourself get too tired.  Don't smoke. If you need help stopping, talk with your healthcare provider.  Avoid being exposed to cigarette smoke (yours or others’).  You may use acetaminophen or ibuprofen to control pain and fever, unless another medicine was prescribed. If you have chronic liver or kidney disease, have ever had a stomach ulcer or gastrointestinal bleeding, or are  taking blood-thinning medicines, talk with your healthcare provider before using these medicines. Aspirin should never be given to anyone under 18 years of age who is ill with a viral infection or fever. It may cause severe liver or brain damage.  Your appetite may be poor, so a light diet is fine. Stay well hydrated by drinking 6 to 8 glasses of fluids per day (water, soft drinks, juices, tea, or soup). Extra fluids will help loosen secretions in the nose and lungs.  Over-the-counter cold medicines will not shorten the length of time you’re sick, but they may be helpful for the following symptoms: cough, sore throat, and nasal and sinus congestion. If you take prescription medicines, ask your healthcare provider or pharmacist which over-the-counter medicines are safe to use. (Note: Don't use decongestants if you have high blood pressure.)  Follow-up care  Follow up with your healthcare provider, or as advised.  When to seek medical advice  Call your healthcare provider right away if any of these occur:  Cough with lots of colored sputum (mucus)  Severe headache; face, neck, or ear pain  Difficulty swallowing due to throat pain  Fever of 100.4°F (38°C) or higher, or as directed by your healthcare provider  Call 911  Call 911 if any of these occur:  Chest pain, shortness of breath, wheezing, or difficulty breathing  Coughing up blood  Very severe pain with swallowing, especially if it goes along with a muffled voice   Date Last Reviewed: 6/1/2018  © 2186-8346 [employer]. [address] [place] [address], [place] [address]. All rights reserved. This information is not intended as a substitute for professional medical care. Always follow your healthcare professional's instructions.

## 2024-03-11 ENCOUNTER — APPOINTMENT (OUTPATIENT)
Dept: PHYSICAL THERAPY | Age: 49
End: 2024-03-11
Attending: INTERNAL MEDICINE
Payer: COMMERCIAL

## 2024-03-11 ENCOUNTER — TELEPHONE (OUTPATIENT)
Dept: INTERNAL MEDICINE CLINIC | Facility: CLINIC | Age: 49
End: 2024-03-11

## 2024-03-11 LAB
FLUAV + FLUBV RNA SPEC NAA+PROBE: NOT DETECTED
FLUAV + FLUBV RNA SPEC NAA+PROBE: NOT DETECTED
RSV RNA SPEC NAA+PROBE: NOT DETECTED
SARS-COV-2 RNA RESP QL NAA+PROBE: DETECTED

## 2024-03-11 NOTE — TELEPHONE ENCOUNTER
Pt had 3/9 OV. Pt asking for paxlovid without VV. Pt said she already discussed the possible s/s of the medication . Pt has SOB with exertion, sinus congestion, PND and mild cough .  Denies fever and chest pain. Please advise Ok for paxlovid without the VV. Thanks

## 2024-03-11 NOTE — TELEPHONE ENCOUNTER
Patient was seen  on 3/9/24 for a cold and would like to know if Malgorzata would prescribe paxlovid for him.

## 2024-03-11 NOTE — TELEPHONE ENCOUNTER
Patient notified. Patient verbalized understanding   Advised the pt to use incentive spirometer or deep breathing exercises throughout the day and to monitor the pulse oximeter , ranging above 90%. Advised the pt if develops SOB and pulse ox 90% or below to go to ER.

## 2024-03-13 ENCOUNTER — APPOINTMENT (OUTPATIENT)
Dept: PHYSICAL THERAPY | Age: 49
End: 2024-03-13
Attending: INTERNAL MEDICINE
Payer: COMMERCIAL

## 2024-03-18 ENCOUNTER — OFFICE VISIT (OUTPATIENT)
Dept: PHYSICAL THERAPY | Age: 49
End: 2024-03-18
Attending: INTERNAL MEDICINE
Payer: COMMERCIAL

## 2024-03-18 PROCEDURE — 97035 APP MDLTY 1+ULTRASOUND EA 15: CPT

## 2024-03-18 PROCEDURE — 97110 THERAPEUTIC EXERCISES: CPT

## 2024-03-18 NOTE — PROGRESS NOTES
Diagnosis:   S/p left index finger A1 pulley release on 10/12/23   Referring Provider: No ref. provider found  Date of Evaluation:    10/20/2023    Precautions:  None Next MD visit: as needed  Date of Surgery: n/a   Insurance Primary/Secondary: [employer] [place] PPO / N/A     # Auth Visits: 14 POC     Subjective: Had to cancel last week due to illness. Left hand/index finger continue to do well, no clicking/locking sensation with gripping.   Current Pain: 0/10.      Objective:   R = 79.4#  L=76.6#  Palpation: no TTP along 2nd digit. Palpable clicking with PIP flexion /ext     AROM left HAND:    IF   MP 0 - 90   PIP 0 - 90   DIP 0 - 55     Left 2nd Digit MMT   Flexion: 5/5  Extension: 5/5  ABD 5/5  ADD 5/5     Hand AROM:  Open/Close Fist: no clicking with left index finger flexion (no locking)    Assessment: Continued use of therapeutic ultrasound. Pt has improved gripping without pain and index finger mobility without clicking/locking. Will follow-up for one additional session to utilize US, then discharge to HEP.      Goals: (to be met in 8 visits)   Pt will improve L  strength to >30 lbs to be able to open a jar without difficulty Met  Pt will demonstrate improved be able to carry >10 # pain free Met  Pt will improve left index finger PIP flexion to >/= 80 deg Met  Pt will be independent and compliant with comprehensive HEP to maintain progress achieved in PT   Pt will report <5 clicking events over course of day    Plan: Continue PT    Date:   1/18/2024  [place]#: 14 Date:   1/24/2024  [place]#: 15 2/22/2024   16 Date:   2/28/2024  [place]#: 17 Date:   3/6/2024  TX#: 18 Date:   3/18/2024  [place]#: 19   -  - Modalities:  -US cont., 3 MHz, 0.3 W/cm2, 15 min      Exercise: 8 mins  Tendon glides  TherEx:  -Left 2nd digit PIP, DIP towel  x20 reps  -Left 2nd digit flexion OP x20 reps  -Left hand tendon gliding x20 reps  -Isometric thumb opposition 10x15 sec hold  -Isometric 2nd digit ABD 10x15 sec hold    -STM 4 mins to L incision  TherEx:  -Left 2nd digit PIP, DIP towel  x20 reps  -Left 2nd digit flexion OP x20 reps  -Left hand tendon gliding x20 reps  -Isometric thumb opposition 10x15 sec hold  -Isometric 2nd digit ABD 10x15 sec hold  - TherEx:  -Left 2nd digit PIP, DIP towel  x20 reps  -Left 2nd digit flexion OP x20 reps  -Left hand tendon gliding x20 reps  -Isometric thumb opposition 10x15 sec hold  -Isometric 2nd digit ABD 10x15 sec hold   Modalities:  -US cont., 3 MHz, 0.3 W/cm2, 15 min  Modalities:  -US cont., 3 MHz, 0.3 W/cm2, 15 min  Modalities:  -US cont., 3 MHz, 0.3 W/cm2, 15 min Modalities:  -US cont., 3 MHz, 0.3 W/cm2, 15 min Modalities:  -US cont., 3 MHz, 0.3 W/cm2, 15 min     HEP: scar mobilization, left 2nd digit flexion OP, putty gripping (full fist, lumbrical ), tendon gliding    Charges: Ultrasound x1 (15 min), TherEx x1 (10 min)        Total Timed Treatment:  25 min  Total Treatment Time:  25  min

## 2024-03-20 ENCOUNTER — OFFICE VISIT (OUTPATIENT)
Dept: PHYSICAL THERAPY | Age: 49
End: 2024-03-20
Attending: INTERNAL MEDICINE
Payer: COMMERCIAL

## 2024-03-20 PROCEDURE — 97035 APP MDLTY 1+ULTRASOUND EA 15: CPT

## 2024-03-20 NOTE — PROGRESS NOTES
Diagnosis:   S/p left index finger A1 pulley release on 10/12/23   Referring Provider: No ref. provider found  Date of Evaluation:    10/20/2023    Precautions:  None Next MD visit: as needed  Date of Surgery: n/a   Insurance Primary/Secondary: [employer] / N/A     # Auth Visits: 14 POC      Discharge Summary  Pt has attended 20 visits in Physical Therapy.     Subjective: Had to cancel last week due to illness. Left hand/index finger continue to do well, no clicking/locking sensation with gripping.   Current Pain: 0/10.      Objective:   R = 79.4#  L=76.6#  Palpation: no TTP along 2nd digit. No palpable clicking with PIP flexion /ext     AROM left HAND:    IF   MP 0 - 90   PIP 0 - 90   DIP 0 - 55     Left 2nd Digit MMT   Flexion: 5/5  Extension: 5/5  ABD 5/5  ADD 5/5     Hand AROM:  Open/Close Fist: no clicking with left index finger flexion (no locking)    Assessment: Improved finger/hand mobility and  strength. No longer having clicking/catching with gripping tasks. He has a comprehensive HEP that he will continue at this time.     Goals: (to be met in 8 visits)   Pt will improve L  strength to >30 lbs to be able to open a jar without difficulty Met  Pt will demonstrate improved be able to carry >10 # pain free Met  Pt will improve left index finger PIP flexion to >/= 80 deg Met  Pt will be independent and compliant with comprehensive HEP to maintain progress achieved in PT Met  Pt will report <5 clicking events over course of day Met      Plan: Discharge from this episode of care to independent HEP.       Patient was advised of these findings, precautions, and treatment options and has agreed to actively participate in planning and for this course of care.    Thank you for your referral. If you have any questions, please contact me at Dept: [phone].    Sincerely,  Electronically signed by therapist: Sherif Mendoza, PT, DPT        Date:   1/18/2024  TX#: 14 Date:   1/24/2024  TX#: 15 2/22/2024   16  Date:   2/28/2024  TX#: 17 Date:   3/6/2024  TX#: 18 Date:   3/18/2024  TX#: 19 Date:   3/20/2024  TX#: 20   -  - Modalities:  -US cont., 3 MHz, 0.3 W/cm2, 15 min      Exercise: 8 mins  Tendon glides  TherEx:  -Left 2nd digit PIP, DIP towel  x20 reps  -Left 2nd digit flexion OP x20 reps  -Left hand tendon gliding x20 reps  -Isometric thumb opposition 10x15 sec hold  -Isometric 2nd digit ABD 10x15 sec hold    -STM 4 mins to L incision TherEx:  -Left 2nd digit PIP, DIP towel  x20 reps  -Left 2nd digit flexion OP x20 reps  -Left hand tendon gliding x20 reps  -Isometric thumb opposition 10x15 sec hold  -Isometric 2nd digit ABD 10x15 sec hold  - TherEx:  -Left 2nd digit PIP, DIP towel  x20 reps  -Left 2nd digit flexion OP x20 reps  -Left hand tendon gliding x20 reps  -Isometric thumb opposition 10x15 sec hold  -Isometric 2nd digit ABD 10x15 sec hold  -   Modalities:  -US cont., 3 MHz, 0.3 W/cm2, 15 min  Modalities:  -US cont., 3 MHz, 0.3 W/cm2, 15 min  Modalities:  -US cont., 3 MHz, 0.3 W/cm2, 15 min Modalities:  -US cont., 3 MHz, 0.3 W/cm2, 15 min Modalities:  -US cont., 3 MHz, 0.3 W/cm2, 15 min Modalities:  -US cont., 3 MHz, 0.3 W/cm2, 15 min     HEP: scar mobilization, left 2nd digit flexion OP, putty gripping (full fist, lumbrical ), tendon gliding    Charges: Ultrasound x1 (15 min)        Total Timed Treatment:  15 min  Total Treatment Time:  15  min

## 2024-05-14 ENCOUNTER — OFFICE VISIT (OUTPATIENT)
Dept: INTERNAL MEDICINE CLINIC | Facility: CLINIC | Age: 49
End: 2024-05-14

## 2024-05-14 VITALS
BODY MASS INDEX: 30.04 KG/M2 | SYSTOLIC BLOOD PRESSURE: 114 MMHG | HEIGHT: 67.17 IN | TEMPERATURE: 98 F | OXYGEN SATURATION: 97 % | DIASTOLIC BLOOD PRESSURE: 70 MMHG | WEIGHT: 193.63 LBS | RESPIRATION RATE: 16 BRPM | HEART RATE: 78 BPM

## 2024-05-14 DIAGNOSIS — Z00.00 ENCOUNTER FOR ANNUAL PHYSICAL EXAM: Primary | ICD-10-CM

## 2024-05-14 DIAGNOSIS — L25.5 PLANT DERMATITIS: ICD-10-CM

## 2024-05-14 DIAGNOSIS — K76.0 FATTY LIVER: ICD-10-CM

## 2024-05-14 DIAGNOSIS — E55.9 VITAMIN D DEFICIENCY, UNSPECIFIED: ICD-10-CM

## 2024-05-14 DIAGNOSIS — R74.8 LIVER ENZYME ELEVATION: ICD-10-CM

## 2024-05-14 DIAGNOSIS — J30.89 ENVIRONMENTAL AND SEASONAL ALLERGIES: ICD-10-CM

## 2024-05-14 PROCEDURE — 99214 OFFICE O/P EST MOD 30 MIN: CPT | Performed by: NURSE PRACTITIONER

## 2024-05-14 PROCEDURE — 3074F SYST BP LT 130 MM HG: CPT | Performed by: NURSE PRACTITIONER

## 2024-05-14 PROCEDURE — 99396 PREV VISIT EST AGE 40-64: CPT | Performed by: NURSE PRACTITIONER

## 2024-05-14 PROCEDURE — 3078F DIAST BP <80 MM HG: CPT | Performed by: NURSE PRACTITIONER

## 2024-05-14 PROCEDURE — 3008F BODY MASS INDEX DOCD: CPT | Performed by: NURSE PRACTITIONER

## 2024-05-14 RX ORDER — AZELASTINE 1 MG/ML
2 SPRAY, METERED NASAL 2 TIMES DAILY
COMMUNITY
Start: 2024-03-20

## 2024-05-14 RX ORDER — PREDNISONE 20 MG/1
40 TABLET ORAL DAILY
Qty: 14 TABLET | Refills: 0 | Status: SHIPPED | OUTPATIENT
Start: 2024-05-14 | End: 2024-05-21

## 2024-05-14 RX ORDER — DIPHENHYDRAMINE HCL 25 MG
50 CAPSULE ORAL EVERY 6 HOURS PRN
COMMUNITY

## 2024-05-14 RX ORDER — MONTELUKAST SODIUM 10 MG/1
10 TABLET ORAL NIGHTLY
COMMUNITY
Start: 2024-03-20

## 2024-05-14 NOTE — PROGRESS NOTES
CHIEF COMPLAINT   Kelton Ba is a 48 year old male who presents for a complete physical exam.     HPI:   Here for physical.    Wt Readings from Last 6 Encounters:   05/14/24 193 lb 9.6 oz (87.8 kg)   03/09/24 192 lb 6.4 oz (87.3 kg)   02/12/24 193 lb 12.8 oz (87.9 kg)   02/01/24 186 lb (84.4 kg)   01/17/24 187 lb (84.8 kg)   01/11/24 187 lb (84.8 kg)     Body mass index is 30.17 kg/m².     Diet and exercise are fair. Vaccines reviewed. Wearing seat belt and no texting and driving. Feels safe at home. Colon cancer screening UTD. No smoking. Occasional alcohol. No skin concerns. Labs reviewed.    Fatty liver- repeat labs ordered.     Allergies- seeing ENT. On singulair. Is helping.     Rash for a week. Increased over the last 4 days. Itching. Benedryl helps. No pain. Was outdoors in the woods. Thinks it can be poison ivy.         Cholesterol, Total (mg/dL)   Date Value   10/21/2023 160   10/04/2022 149   07/28/2021 156   07/28/2020 164   09/13/2019 152     CHOLESTEROL, TOTAL (mg/dL)   Date Value   08/30/2013 143     HDL Cholesterol (mg/dL)   Date Value   10/21/2023 44   10/04/2022 44   07/28/2021 43   07/28/2020 38 (L)   09/13/2019 43     HDL CHOLESTEROL (mg/dL)   Date Value   08/30/2013 40     LDL Cholesterol (mg/dL)   Date Value   07/28/2021 80   07/28/2020 76   09/13/2019 72     LDL-CHOLESTEROL (mg/dL (calc))   Date Value   08/30/2013 76     AST (U/L)   Date Value   10/21/2023 27   04/03/2023 30   10/04/2022 27   07/28/2021 21   07/28/2020 22   08/30/2013 28     ALT (U/L)   Date Value   10/21/2023 51   04/03/2023 50   10/04/2022 45   07/28/2021 47   07/28/2020 46   08/30/2013 42      Current Outpatient Medications   Medication Sig Dispense Refill    azelastine 0.1 % Nasal Solution 2 sprays by Nasal route 2 (two) times daily.      montelukast 10 MG Oral Tab Take 1 tablet (10 mg total) by mouth nightly.      diphenhydrAMINE (BENADRYL ALLERGY) 25 MG Oral Cap Take 2 capsules (50 mg total) by mouth every 6 (six)  hours as needed for Itching.        No Known Allergies   Past Medical History:    Allergic rhinitis    Carpal tunnel syndrome    CPAP (continuous positive airway pressure) dependence    ETD (eustachian tube dysfunction)    Migraine    Neuropathy    numbness and tingling right hand, ulnar issues    MATT (obstructive sleep apnea)    cpap 10?    MATT (obstructive sleep apnea)    Otalgia of left ear    Persistent disorder of initiating or maintaining wakefulness    Ulnar nerve abnormality    Unspecified sleep apnea    URI (upper respiratory infection)    Visual impairment    wears glasses for distance      Past Surgical History:   Procedure Laterality Date    Adenoidectomy      Cholecystectomy  2015    Colonoscopy      Colonoscopy      Femur/knee surg unlisted      right knee    Laparoscopic cholecystectomy N/A 3/4/2015    Procedure: LAPAROSCOPIC CHOLECYSTECTOMY;  Surgeon: Torres Alegre MD;  Location: [facility]IN OR    Sinus surgery          Family History   Problem Relation Age of Onset    No Known Problems Father     Other (ulcerative colitis) Mother     Other (colon ca) Maternal Grandmother 40    Cancer Maternal Grandfather         breast cancer    Other (MI) Maternal Grandfather     Breast Cancer Paternal Grandmother     Other (lymphoma) Paternal Grandfather     No Known Problems Sister       Social History:  Social History     Socioeconomic History    Marital status:    Occupational History    Occupation: sales management   Tobacco Use    Smoking status: Never     Passive exposure: Never    Smokeless tobacco: Never   Vaping Use    Vaping status: Never Used   Substance and Sexual Activity    Alcohol use: Yes     Comment: 1 beer per month    Drug use: No   Other Topics Concern    Caffeine Concern Yes     Comment: soda    Stress Concern No    Weight Concern No    Special Diet No    Exercise Yes     Comment: rare    Seat Belt Yes         REVIEW OF SYSTEMS:   GENERAL: feels well otherwise  SKIN: no complaint of  any unusual skin lesions  EYES: no complaint of blurred vision or double vision  HEENT: no complaint of nasal congestion, sinus pain or ST  LUNGS: no complaint of shortness of breath with exertion  CARDIOVASCULAR: no complaint of chest pain on exertion  GI: no complaint of pain,denies heartburn  : no complaint of nocturia or changes in stream  MUSCULOSKELETAL: no complaint of back pain  NEURO: no complaint of headaches  PSYCHE: no complaint of depression or anxiety, some stress  HEMATOLOGIC: denies hx of anemia    EXAM:   /70 (BP Location: Right arm, Patient Position: Sitting, Cuff Size: adult)   Pulse 78   Temp 97.7 °F (36.5 °C) (Temporal)   Resp 16   Ht 5' 7.17\" (1.706 m)   Wt 193 lb 9.6 oz (87.8 kg)   SpO2 97%   BMI 30.17 kg/m²   Body mass index is 30.17 kg/m².   GENERAL: well developed, well nourished,in no apparent distress  SKIN:  no suspicious lesions, patch of dermatitis to right leg then generally to arms, shoulder  HEENT: atraumatic, normocephalic, ears are clear  EYES:PERRLA, EOMI, conjunctiva are clear  NECK: supple,no adenopathy, no thyromegaly   LUNGS: clear to auscultation  CARDIO: RRR without murmur  GI: Nondistended.  Nontender. No masses.  No organomegaly.  : deferred- denies changes.  MUSCULOSKELETAL: back is not tender,FROM of the back  EXTREMITIES: no edema  NEURO: Oriented times three, cranial nerves are grossly intact,motor and sensory are grossly intact      LABS:     Lab Results   Component Value Date    WBC 8.4 10/21/2023    RBC 4.83 04/03/2023    HGB 15.2 10/21/2023    HCT 43.9 10/21/2023    MCV 88 10/21/2023    MCH 30.2 04/03/2023    MCHC 34.9 04/03/2023    RDW 12.7 04/03/2023     10/21/2023      Lab Results   Component Value Date    GLU 84 10/21/2023    BUN 13 10/21/2023    BUNCREA 16.0 07/28/2021    CREATSERUM 0.94 10/21/2023    ANIONGAP 5 04/03/2023    GFR 81 06/30/2017    GFRNAA 99 10/21/2023    GFRAA 102 07/28/2021    CA 9.3 10/21/2023    OSMOCALC 293  04/03/2023    ALKPHO 105 04/03/2023    AST 27 10/21/2023    ALT 51 10/21/2023    BILT 0.4 10/21/2023    TP 6.7 10/21/2023    ALB 3.6 04/03/2023    GLOBULIN 3.4 04/03/2023    AGRATIO 2.0 08/30/2013     04/03/2023    K 4.3 10/21/2023     10/21/2023    CO2 25.0 04/03/2023      Lab Results   Component Value Date    CHOLEST 160 10/21/2023    TRIG 121 10/21/2023    HDL 44 10/21/2023    LDL 80 07/28/2021    VLDL 31 (H) 07/28/2021    TCHDLRATIO 3.40 10/04/2022    NONHDLC 113 07/28/2021      Lab Results   Component Value Date    TSH 1.500 10/21/2023      Lab Results   Component Value Date     07/28/2021    A1C 5.7 10/21/2023       IMAGING:     CT CALCIUM SCORING    Result Date: 5/25/2023            PROCEDURE:  CT CALCIUM SCORING  COMPARISON:  [facility] , CT, CT ANGIOGRAPHY, CHEST (CPT=71275), 3/21/2020, 8:54 PM.  INDICATIONS:  Z13.9 Encounter for screening  TECHNIQUE:  The patient was placed in the supine position on the multidector CT table at [facility].  EKG Gated was used to minimize cardiac motion. Non contrast 2mm axial cross sectional images were obtained in a narrow field of view to display heart. Dose reduction techniques were used. Dose information is transmitted to the ACR (American College of Radiology) NRDR (National Radiology Data Registry) which includes the Dose Index Registry. See the Radiologist's over-read for evaluation of non-cardiac and non-vascular structures.  FINDINGS:  CALCIUM SCORING RESULTS (Volume / [place]):  LEFT MAIN:        0.00 / 0.00 RCA:          0.00 / 0.00 LAD:          0.00 / 0.00 CIRCUMFLEX:      0.00 / 0.00  TOTAL:        0.00 / 0.00  Your Coronary Artery Calcium Score: Zero  Clinical Relevance of Coronary Artery Scan Results (may change depending on age and sex of patient.)  Calcium Score  Implication  Risk of Coronary Artery Disease  0  No identifiable plaque  Very low, generally less than 5%  1-10  Minimal identifiable plaque  Very unlikely, less than 10%     Definite, at least mild atherosclerotic plaque  Mild risk, mild coronary atherosclerosis likely  101-400  Definite, at least moderate atherosclerotic plaque  Moderate risk, mild coronary artery disease highly likely,     significant atherosclerosis possible  401-1000  Definite, at least moderate atherosclerotic plaque  High risk, moderate coronary artery disease highly likely  >1000  Definite, moderate to severe atherosclerotic plaque  High risk, moderate to severe coronary artery disease     highly likely  This patient identified you as their physician, if this is not correct please contact the Nurse [facility] at [phone] and they will assign this report to another physician.   Dictated by (CST): Chencho Kaur MD on 5/25/2023 at 9:29 PM     Finalized by (CST): Chencho Kaur MD on 5/25/2023 at 9:30 PM       CT CALCIUM SCORING OVER READ    Result Date: 5/22/2023  This is an over read for the non-cardiac, non-vascular limited visualized portions of the chest and abdomen.  PROCEDURE:  CT CALCIUM SCORING OVER READ  COMPARISON:  [facility] , CT, CT ANGIOGRAPHY, CHEST (CPT=71275), 3/21/2020, 8:54 PM.  INDICATIONS:  Z13.9 Encounter for screening  TECHNIQUE:  Unenhanced multislice CT scanning is performed through the chest as part of a cardiac CT evaluation.  Dose reduction techniques were used. Dose information is transmitted to the ACR (American College of Radiology) NRDR (National Radiology Data Registry) which includes the Dose Index Registry.  PATIENT STATED HISTORY: (As transcribed by Technologist)  No problem or complaints.    FINDINGS:  LUNGS:  No visible pulmonary disease.  BRITNEY:  No mass or adenopathy.  MEDIASTINUM:  No mass or adenopathy.  PLEURA:  No mass or effusion.  CHEST WALL:  No mass or axillary adenopathy.  LIMITED ABDOMEN:  Hepatic steatosis. BONES:  No bony lesion or fracture.    This is an over read for the non-cardiac, non-vascular limited visualized portions of the chest and abdomen. This exam  was not performed as a complete diagnostic CT of the chest. Please see the cardiologists' dictation which is reported separately.            CONCLUSION:  Hepatic steatosis.   LOCATION:  [facility]    Dictated by (CST): Marcio Kaur MD on 5/22/2023 at 11:15 AM     Finalized by (CST): Marcio Kaur MD on 5/22/2023 at 11:17 AM           ASSESSMENT AND PLAN:   1. Encounter for annual physical exam  - Kelton Ba is a 47 year old male who presents for a complete physical exam. Pt's weight is Body mass index is 29.8 kg/m²., recommended low fat diet and aerobic exercise 30 minutes three times weekly.  Labs reviewed and ordered. Vaccines reviewed. C scope UTD. Sees derm.  - CBC WITH DIFFERENTIAL WITH PLATELET; Future  - COMP METABOLIC PANEL (14); Future     2. Fatty liver  3. Liver enzyme elevation  - low fat diet and exercise   - CTM labs    4. Vitamin D deficiency, unspecified  - continue supplement     5. Environmental and seasonal allergies  - stable. Continue current management.    6. Plant dermatitis  - start prednisone, continue benedryl  - CTM for resolution   - predniSONE 20 MG Oral Tab; Take 2 tablets (40 mg total) by mouth daily for 7 days.  Dispense: 14 tablet; Refill: 0    The patient indicates understanding of these issues and agrees to the plan.  The patient is asked to return in 1 year for a complete physical.

## 2024-05-14 NOTE — PATIENT INSTRUCTIONS
COVID vaccine recommended     Check with your insurance to verify coverage for the Shingrix vaccine for shingles. Also check to see where you can go to get the vaccine. You can also get a price check at the pharmacy instead.      Start prednisone and monitor effectiveness    Make an apt to see dermatology when due    Get your labs done when you can. You should be fasting for at least 10 hours. If you take a multivitamin with Biotin or any biotin product it should be held for 3 days prior to getting your labs done.     Low fat diet and exercise

## 2024-07-13 ENCOUNTER — OFFICE VISIT (OUTPATIENT)
Dept: INTERNAL MEDICINE CLINIC | Facility: CLINIC | Age: 49
End: 2024-07-13
Payer: COMMERCIAL

## 2024-07-13 VITALS
BODY MASS INDEX: 29.04 KG/M2 | SYSTOLIC BLOOD PRESSURE: 120 MMHG | HEIGHT: 67.17 IN | HEART RATE: 69 BPM | DIASTOLIC BLOOD PRESSURE: 80 MMHG | OXYGEN SATURATION: 97 % | RESPIRATION RATE: 16 BRPM | TEMPERATURE: 98 F | WEIGHT: 187.19 LBS

## 2024-07-13 DIAGNOSIS — R21 RASH AND NONSPECIFIC SKIN ERUPTION: Primary | ICD-10-CM

## 2024-07-13 PROCEDURE — 3079F DIAST BP 80-89 MM HG: CPT | Performed by: NURSE PRACTITIONER

## 2024-07-13 PROCEDURE — 99213 OFFICE O/P EST LOW 20 MIN: CPT | Performed by: NURSE PRACTITIONER

## 2024-07-13 PROCEDURE — 3074F SYST BP LT 130 MM HG: CPT | Performed by: NURSE PRACTITIONER

## 2024-07-13 PROCEDURE — 3008F BODY MASS INDEX DOCD: CPT | Performed by: NURSE PRACTITIONER

## 2024-07-14 NOTE — PATIENT INSTRUCTIONS
Continue to monitor the rash for resolution    If no improvement or worsening then start hydrocortisone and monitor.    If still no improvement let the office know

## 2024-07-14 NOTE — PROGRESS NOTES
Kelton Ba is a 48 year old male.  CHIEF COMPLAINT   Rash    HPI:   Patient was backpacking on a trip.  While he was gone he noticed a rash to his back.  At first it had raised medium bumps that were red, then it almost resolved with hydrocortisone.  When it restarted they were raised small bumps that are gone.  For the last week he has not been putting anything on it and it is improving gradually with no treatment.  He just wanted to make sure to get it checked.  No pain to the area.    Current Outpatient Medications   Medication Sig Dispense Refill    triamcinolone 0.1 % External Cream Apply topically 2 (two) times daily as needed. 60 g 0    azelastine 0.1 % Nasal Solution 2 sprays by Nasal route 2 (two) times daily.      montelukast 10 MG Oral Tab Take 1 tablet (10 mg total) by mouth nightly.      diphenhydrAMINE (BENADRYL ALLERGY) 25 MG Oral Cap Take 2 capsules (50 mg total) by mouth every 6 (six) hours as needed for Itching.        Past Medical History:    Allergic rhinitis    Carpal tunnel syndrome    CPAP (continuous positive airway pressure) dependence    ETD (eustachian tube dysfunction)    Migraine    Neuropathy    numbness and tingling right hand, ulnar issues    MATT (obstructive sleep apnea)    cpap 10?    MATT (obstructive sleep apnea)    Otalgia of left ear    Persistent disorder of initiating or maintaining wakefulness    Ulnar nerve abnormality    Unspecified sleep apnea    URI (upper respiratory infection)    Visual impairment    wears glasses for distance      Social History:  Social History     Socioeconomic History    Marital status:    Occupational History    Occupation: sales management   Tobacco Use    Smoking status: Never     Passive exposure: Never    Smokeless tobacco: Never   Vaping Use    Vaping status: Never Used   Substance and Sexual Activity    Alcohol use: Yes     Comment: 1 beer per month    Drug use: No   Other Topics Concern    Caffeine Concern Yes     Comment: soda     Stress Concern No    Weight Concern No    Special Diet No    Exercise Yes     Comment: rare    Seat Belt Yes        REVIEW OF SYSTEMS:   See HPI     EXAM:     /80 (BP Location: Left arm, Patient Position: Sitting, Cuff Size: adult)   Pulse 69   Temp 97.6 °F (36.4 °C) (Temporal)   Resp 16   Ht 5' 7.17\" (1.706 m)   Wt 187 lb 3.2 oz (84.9 kg)   SpO2 97%   BMI 29.17 kg/m²   Body mass index is 29.17 kg/m².   GENERAL: well developed, well nourished,in no apparent distress  SKIN: See photo below  HEENT: atraumatic, normocephalic letter   LUNGS: clear to auscultation; no rhonchi, rales, or wheezing  CARDIO: RRR without murmur  GI: no tenderness  MUSCULOSKELETAL:  Normal gait.  EXTREMITIES: no edema  NEURO: Oriented times three    Back    LABS:      Lab Results   Component Value Date    WBC 8.4 10/21/2023    RBC 4.83 04/03/2023    HGB 15.2 10/21/2023    HCT 43.9 10/21/2023    MCV 88 10/21/2023    MCH 30.2 04/03/2023    MCHC 34.9 04/03/2023    RDW 12.7 04/03/2023     10/21/2023      Lab Results   Component Value Date    GLU 84 10/21/2023    BUN 13 10/21/2023    BUNCREA 16.0 07/28/2021    CREATSERUM 0.94 10/21/2023    ANIONGAP 5 04/03/2023    GFR 81 06/30/2017    GFRNAA 99 10/21/2023    GFRAA 102 07/28/2021    CA 9.3 10/21/2023    OSMOCALC 293 04/03/2023    ALKPHO 105 04/03/2023    AST 27 10/21/2023    ALT 51 10/21/2023    BILT 0.4 10/21/2023    TP 6.7 10/21/2023    ALB 3.6 04/03/2023    GLOBULIN 3.4 04/03/2023    AGRATIO 2.0 08/30/2013     04/03/2023    K 4.3 10/21/2023     10/21/2023    CO2 25.0 04/03/2023      Lab Results   Component Value Date    CHOLEST 160 10/21/2023    TRIG 121 10/21/2023    HDL 44 10/21/2023    LDL 80 07/28/2021    VLDL 31 (H) 07/28/2021    TCHDLRATIO 3.40 10/04/2022    NONHDLC 113 07/28/2021      Lab Results   Component Value Date    TSH 1.500 10/21/2023      Lab Results   Component Value Date     07/28/2021    A1C 5.7 10/21/2023        IMAGING:     No results  found.     ASSESSMENT AND PLAN:   1. Rash and nonspecific skin eruption  -Has likely heat rash to his lower back.  Monitor the rash for improvement.  -If still no improvement start hydrocortisone and monitor closely  -If still no improvement let the office know      The patient indicates understanding of these issues and agrees to the plan.  The patient is asked to return as needed or when routine care is due.

## 2024-08-15 ENCOUNTER — OFFICE VISIT (OUTPATIENT)
Dept: INTERNAL MEDICINE CLINIC | Facility: CLINIC | Age: 49
End: 2024-08-15
Payer: COMMERCIAL

## 2024-08-15 VITALS
DIASTOLIC BLOOD PRESSURE: 80 MMHG | HEART RATE: 82 BPM | HEIGHT: 67.17 IN | OXYGEN SATURATION: 97 % | BODY MASS INDEX: 30.28 KG/M2 | SYSTOLIC BLOOD PRESSURE: 114 MMHG | RESPIRATION RATE: 16 BRPM | WEIGHT: 195.19 LBS | TEMPERATURE: 98 F

## 2024-08-15 DIAGNOSIS — U07.1 COVID-19 VIRUS INFECTION: Primary | ICD-10-CM

## 2024-08-15 DIAGNOSIS — H92.02 OTALGIA, LEFT EAR: ICD-10-CM

## 2024-08-15 LAB
COVID19 BINAX NOW ANTIGEN: DETECTED
OPERATOR ID: ABNORMAL
POCT LOT NUMBER: ABNORMAL

## 2024-08-15 PROCEDURE — 3079F DIAST BP 80-89 MM HG: CPT | Performed by: NURSE PRACTITIONER

## 2024-08-15 PROCEDURE — G2211 COMPLEX E/M VISIT ADD ON: HCPCS | Performed by: NURSE PRACTITIONER

## 2024-08-15 PROCEDURE — 3074F SYST BP LT 130 MM HG: CPT | Performed by: NURSE PRACTITIONER

## 2024-08-15 PROCEDURE — 3008F BODY MASS INDEX DOCD: CPT | Performed by: NURSE PRACTITIONER

## 2024-08-15 PROCEDURE — 99214 OFFICE O/P EST MOD 30 MIN: CPT | Performed by: NURSE PRACTITIONER

## 2024-08-15 NOTE — PATIENT INSTRUCTIONS
Start Paxlovid. Monitor for any side effects or allergy.     Do warm, salt water gargles 2-3 times daily.    You can use Robitussin DM or Mucinex DM as directed on the bottle for cough and chest congestion.     Use Cepacol for sore throat and dry cough as needed.     Use Tylenol as needed for pain or fever.    Stay hydrated.    Go to ER or call 911 if worsening symptoms such as persistent fever >103, difficulty breathing, or chest pain.    Infection prevention:  - Proper hand hygiene is very important          - Wear a mask in public  - Avoid touching your mouth, nose, eyes, and face  - Practice social distancing and staying 6 ft away from other individuals  - Cough into your arm or a tissue  - Avoid contact with others if you have symptoms of an upper or lower respiratory infection  - Avoid contact with others who are ill  - Avoid direct contact with your pets if you are ill  - Disinfect surfaces with appropriate materials  - If your symptoms become severe (shortness of breath with rest or activity, fever, chest congestion, productive cough), go to the ER  - Recommend self-isolation at home if you are sick, wearing a mask if in room with other family members    CDC Update: Isolation & Quarantine Guidelines for Patients - Updated 12/29/2021  Patients who are healthcare workers, follow your organization policy.    If You Test Positive for COVID-19 (Isolate)    Everyone regardless of vaccination status:   Stay home for 5 days  If you have no symptoms or your symptoms are resolving after 5 days, you can leave your house, return to work, travel, etc.  Continue to wear a mask around others for 5 additional days.  If you have a fever, continue to stay home until your fever resolves.      If You Were Exposed to Someone with COVID-19 (Quarantine)    If you:               Have been boosted  OR  Completed the primary series of Pfizer or Moderna vaccine within the last 6 months  OR  Completed the primary series of J&J vaccine  within the last 2 months    Wear a mask around others for 10 days.  Test on day 5, if possible.  If you develop symptoms get a test and stay home.      If you:  Completed the primary series of Pfizer or Moderna vaccine over 6 months ago and are not boosted  OR  Completed the primary series of J&J over 2 months ago and are not boosted  OR  Are unvaccinated    Stay home for 5 days. After that continue to wear a mask around others for 5 additional days.  If you can't quarantine you must wear a mask for 10 days.  Test on day 5 if possible.  If you develop symptoms get a test and stay home.     cdc.gov/coronavirus    10 THINGS YOU CAN DO TO MANAGE YOUR COVID-19 SYMPTOMS AT HOME    If you have possible or confirmed COVID-19    Stay home except to get medical care.  Monitor your symptoms carefully. If your symptoms get worse, call your healthcare provider immediately.  Get rest and stay hydrated.  If you have a medical appointment, call the healthcare provider ahead of time and tell them you have or may have COVID-19.  For medical emergencies, call 911 and notify the dispatch personnel that you have or may have COVID-19.  Cover your cough and sneezes with a tissue or use the inside of your elbow.  Wash your hands often with soap and water for at least 20 seconds or clean hands with an alcohol-based hand  that contains at least 60% alcohol.  As much as possible, stay in a specific room and away from other people in your home. Also, you should use a separate bathroom, if available. If you need to be around other people in or outside of the home, wear a mask.  Avoid sharing personal items with other people in your household, like dishes, towels, and bedding.  Clean all surfaces that are touched often, like counters, tabletops, and doorknobs. Use household cleaning sprays or wipes according to the label instructions.

## 2024-08-15 NOTE — PROGRESS NOTES
CHIEF COMPLAINT:     Chief Complaint   Patient presents with    Viral Syndrome     Started 3 days ago - Left Pain/Pressure, Sinus Congestion/Drainage/Pain/Pressure, Cough - Dry, Feverish feeling - no temp taken, fatigue    No Home Covid Tests       HPI:   Kelton Ba is a 49 year old male coming in with complaints of URI symptoms. Started to feel \"run down\" on Sunday. Symptoms got worse on Monday. Having left ear pain/pressure, sinus congestion, sinus pressure, cough, and subjective fevers. No fever today in office. Not taking anything OTC. Denies chills, SOB, CP, loss of taste/smell, nausea, vomiting, or diarrhea.     Past Medical History:    Allergic rhinitis    Carpal tunnel syndrome    CPAP (continuous positive airway pressure) dependence    ETD (eustachian tube dysfunction)    Migraine    Neuropathy    numbness and tingling right hand, ulnar issues    MATT (obstructive sleep apnea)    cpap 10?    MATT (obstructive sleep apnea)    Otalgia of left ear    Persistent disorder of initiating or maintaining wakefulness    Ulnar nerve abnormality    Unspecified sleep apnea    URI (upper respiratory infection)    Visual impairment    wears glasses for distance      Past Surgical History:   Procedure Laterality Date    Adenoidectomy      Cholecystectomy  2015    Colonoscopy      Colonoscopy      Femur/knee surg unlisted      right knee    Laparoscopic cholecystectomy N/A 3/4/2015    Procedure: LAPAROSCOPIC CHOLECYSTECTOMY;  Surgeon: Torres Alegre MD;  Location:  [facility] OR    Sinus surgery          Social History:  Social History     Socioeconomic History    Marital status:    Occupational History    Occupation: sales management   Tobacco Use    Smoking status: Never     Passive exposure: Never    Smokeless tobacco: Never   Vaping Use    Vaping status: Never Used   Substance and Sexual Activity    Alcohol use: Yes     Comment: 1 beer per month    Drug use: No   Other Topics Concern    Caffeine Concern Yes      Comment: soda    Stress Concern No    Weight Concern No    Special Diet No    Exercise Yes     Comment: rare    Seat Belt Yes      Family History:  Family History   Problem Relation Age of Onset    No Known Problems Father     Other (ulcerative colitis) Mother     Other (colon ca) Maternal Grandmother 40    Cancer Maternal Grandfather         breast cancer    Other (MI) Maternal Grandfather     Breast Cancer Paternal Grandmother     Other (lymphoma) Paternal Grandfather     No Known Problems Sister       Allergies:  No Known Allergies   Current Meds:  Current Outpatient Medications   Medication Sig Dispense Refill    nirmatrelvir-ritonavir 300-100 MG Oral Tablet Therapy Pack Take two nirmatrelvir tablets (300mg) with one ritonavir tablet (100mg) together twice daily for 5 days. 30 tablet 0    triamcinolone 0.1 % External Cream Apply topically 2 (two) times daily as needed. 60 g 0    azelastine 0.1 % Nasal Solution 2 sprays by Nasal route 2 (two) times daily.      montelukast 10 MG Oral Tab Take 1 tablet (10 mg total) by mouth nightly.         Counseling given: Not Answered       REVIEW OF SYSTEMS:   See HPI.    EXAM:     /80 (BP Location: Left arm, Patient Position: Sitting, Cuff Size: adult)   Pulse 82   Temp 97.5 °F (36.4 °C) (Temporal)   Resp 16   Ht 5' 7.17\" (1.706 m)   Wt 195 lb 3.2 oz (88.5 kg)   SpO2 97%   BMI 30.42 kg/m²   Body mass index is 30.42 kg/m².   Vital signs reviewed. Appears stated age, well groomed, in no acute distress.  Physical Exam:  GENERAL: Patient is alert, awake and oriented, well developed, well nourished.  HEENT: Head: Normocephalic, atraumatic. Eyes: EOMI, PERRLA, conjunctivae clear bilaterally, no eye discharge. Ears: External normal, TM clear bilaterally. No maxillary or frontal sinus tenderness.  NECK: Supple, no CLAD  HEART: RRR without murmur.  LUNGS: Clear to auscultation bilaterally, no rales/rhonchi/wheezing.  ABDOMEN: good BS's, no masses, HSM or  tenderness  MUSCULOSKELETAL: No obvious joint deformity or swelling.   EXTREMITIES: No edema, no cyanosis, no clubbing, FROM  NEURO: Oriented time three.     LABS:      Lab Results   Component Value Date    WBC 8.4 10/21/2023    RBC 4.83 04/03/2023    HGB 15.2 10/21/2023    HCT 43.9 10/21/2023    MCV 88 10/21/2023    MCH 30.2 04/03/2023    MCHC 34.9 04/03/2023    RDW 12.7 04/03/2023     10/21/2023      Lab Results   Component Value Date    GLU 84 10/21/2023    BUN 13 10/21/2023    BUNCREA 16.0 07/28/2021    CREATSERUM 0.94 10/21/2023    ANIONGAP 5 04/03/2023    GFR 81 06/30/2017    GFRNAA 99 10/21/2023    GFRAA 102 07/28/2021    CA 9.3 10/21/2023    OSMOCALC 293 04/03/2023    ALKPHO 105 04/03/2023    AST 27 10/21/2023    ALT 51 10/21/2023    BILT 0.4 10/21/2023    TP 6.7 10/21/2023    ALB 3.6 04/03/2023    GLOBULIN 3.4 04/03/2023    AGRATIO 2.0 08/30/2013     04/03/2023    K 4.3 10/21/2023     10/21/2023    CO2 25.0 04/03/2023      Lab Results   Component Value Date    CHOLEST 160 10/21/2023    TRIG 121 10/21/2023    HDL 44 10/21/2023    LDL 80 07/28/2021    VLDL 31 (H) 07/28/2021    TCHDLRATIO 3.40 10/04/2022    NONHDLC 113 07/28/2021      Lab Results   Component Value Date    TSH 1.500 10/21/2023      Lab Results   Component Value Date     07/28/2021    A1C 5.7 10/21/2023        IMAGING:     No results found.     ASSESSMENT AND PLAN:   1. COVID-19 virus infection  -Rapid covid test was positive  -He is a candidate for Paxlovid and interested in starting. SE discussed  -Supportive care discussed  -Isolation guidelines discussed  -Go to ER for shortness of breath or chest pain  -See patient instructions for education  - COVID19 BinaxNOW Antigen  - nirmatrelvir-ritonavir 300-100 MG Oral Tablet Therapy Pack; Take two nirmatrelvir tablets (300mg) with one ritonavir tablet (100mg) together twice daily for 5 days.  Dispense: 30 tablet; Refill: 0    2. Otalgia, left ear  -Likely d/t eustachian  tube dysfunction  -Recommended to start OTC Flonase   -Follow up if symptoms worsen/do not improve     The patient indicates understanding of these issues and agrees to the plan.  Return if symptoms worsen or fail to improve.    Evelyn Alvarez, APRN  8/15/2024

## 2024-08-31 ENCOUNTER — HOSPITAL ENCOUNTER (OUTPATIENT)
Age: 49
Discharge: HOME | End: 2024-08-31
Payer: COMMERCIAL

## 2024-08-31 VITALS
HEART RATE: 81 BPM | HEIGHT: 68 IN | OXYGEN SATURATION: 97 % | RESPIRATION RATE: 24 BRPM | DIASTOLIC BLOOD PRESSURE: 80 MMHG | SYSTOLIC BLOOD PRESSURE: 115 MMHG | TEMPERATURE: 98 F | WEIGHT: 190 LBS | BODY MASS INDEX: 28.79 KG/M2

## 2024-08-31 DIAGNOSIS — J06.9 VIRAL URI: Primary | ICD-10-CM

## 2024-08-31 LAB
S PYO AG THROAT QL: NEGATIVE
SARS-COV-2 RNA RESP QL NAA+PROBE: NOT DETECTED

## 2024-08-31 RX ORDER — FLUTICASONE PROPIONATE 50 MCG
SPRAY, SUSPENSION (ML) NASAL DAILY
COMMUNITY

## 2024-08-31 NOTE — ED INITIAL ASSESSMENT (HPI)
Pt had covid 2 weeks ago and took paxlovid, Pt was better then 4 days ago began with congestion , cough and sore throat but no fever

## 2024-08-31 NOTE — ED PROVIDER NOTES
Patient Seen in: [facility]      History     Chief Complaint   Patient presents with    Cough/URI     Stated Complaint: Sore throat. fatigue, runny nose    Subjective:   This a 49-year-old male with below stated medical history.  Presents to immediate care for runny nose, sore throat, ear fullness, and fatigue.  Symptom started Wednesday.  Patient was positive for COVID 2 weeks ago and completed a course of Paxlovid.  No difficulty breathing or wheezing.  No chest pain or shortness of breath.  He is taking antihistamines and using Flonase with little relief.     The history is provided by the patient.           Objective:   Past Medical History:    Allergic rhinitis    Carpal tunnel syndrome    CPAP (continuous positive airway pressure) dependence    ETD (eustachian tube dysfunction)    Migraine    Neuropathy    numbness and tingling right hand, ulnar issues    MATT (obstructive sleep apnea)    cpap 10?    MATT (obstructive sleep apnea)    Otalgia of left ear    Persistent disorder of initiating or maintaining wakefulness    Ulnar nerve abnormality    Unspecified sleep apnea    URI (upper respiratory infection)    Visual impairment    wears glasses for distance              Past Surgical History:   Procedure Laterality Date    Adenoidectomy      Cholecystectomy  2015    Colonoscopy      Colonoscopy      Femur/knee surg unlisted      right knee    Hand/finger surgery unlisted      Laparoscopic cholecystectomy N/A 03/04/2015    Procedure: LAPAROSCOPIC CHOLECYSTECTOMY;  Surgeon: Torres Alegre MD;  Location:  [facility] OR    Sinus surgery                    Social History     Socioeconomic History    Marital status:    Occupational History    Occupation: sales management   Tobacco Use    Smoking status: Never     Passive exposure: Never    Smokeless tobacco: Never   Vaping Use    Vaping status: Never Used   Substance and Sexual Activity    Alcohol use: Yes     Comment: 1 beer per month     Drug use: No   Other Topics Concern    Caffeine Concern Yes     Comment: soda    Stress Concern No    Weight Concern No    Special Diet No    Exercise Yes     Comment: rare    Seat Belt Yes              Review of Systems   Constitutional:  Positive for fatigue. Negative for chills and fever.   HENT:  Positive for congestion, ear pain, rhinorrhea and sore throat. Negative for ear discharge.    Respiratory:  Positive for cough. Negative for shortness of breath, wheezing and stridor.    Cardiovascular:  Negative for chest pain, palpitations and leg swelling.   Gastrointestinal:  Negative for abdominal pain, constipation, nausea and vomiting.   Genitourinary:  Negative for dysuria.   Musculoskeletal:  Negative for back pain, neck pain and neck stiffness.   Skin:  Negative for rash.   Allergic/Immunologic: Negative for environmental allergies.   Neurological:  Negative for headaches.   Hematological:  Does not bruise/bleed easily.       Positive for stated Chief Complaint: Cough/URI    Other systems are as noted in HPI.  Constitutional and vital signs reviewed.      All other systems reviewed and negative except as noted above.    Physical Exam     ED Triage Vitals [08/31/24 0808]   /80   Pulse 81   Resp 24   Temp 98 °F (36.7 °C)   Temp src Temporal   SpO2 97 %   O2 Device None (Room air)       Current Vitals:   Vital Signs  BP: 115/80  Pulse: 81  Resp: 24  Temp: 98 °F (36.7 °C)  Temp src: Temporal    Oxygen Therapy  SpO2: 97 %  O2 Device: None (Room air)            Physical Exam  Vitals and nursing note reviewed.   Constitutional:       General: He is not in acute distress.     Appearance: Normal appearance. He is not ill-appearing, toxic-appearing or diaphoretic.   HENT:      Head: Normocephalic and atraumatic.      Right Ear: Tympanic membrane, ear canal and external ear normal. There is no impacted cerumen.      Left Ear: Tympanic membrane, ear canal and external ear normal. There is no impacted cerumen.       Nose: Congestion and rhinorrhea present.      Mouth/Throat:      Mouth: Mucous membranes are moist.      Pharynx: Oropharynx is clear. Posterior oropharyngeal erythema present. No oropharyngeal exudate.   Eyes:      General:         Right eye: No discharge.         Left eye: No discharge.      Extraocular Movements: Extraocular movements intact.      Conjunctiva/sclera: Conjunctivae normal.   Cardiovascular:      Rate and Rhythm: Normal rate and regular rhythm.      Heart sounds: Normal heart sounds. No murmur heard.  Pulmonary:      Effort: Pulmonary effort is normal. No respiratory distress.      Breath sounds: Normal breath sounds. No stridor. No wheezing, rhonchi or rales.   Musculoskeletal:      Cervical back: Neck supple.      Right lower leg: No edema.      Left lower leg: No edema.   Skin:     General: Skin is warm and dry.      Capillary Refill: Capillary refill takes less than 2 seconds.      Findings: No rash.   Neurological:      Mental Status: He is alert and oriented to person, place, and time.   Psychiatric:         Mood and Affect: Mood normal.         Behavior: Behavior normal.               ED Course     Labs Reviewed   POCT RAPID STREP - Normal   RAPID SARS-COV-2 BY PCR - Normal             Rapid COVID, rapid strep         MDM        Vital signs stable.  Patient is well-appearing and nontoxic looking.  Presents to immediate care for URI symptoms.    Differential diagnosis includes is not limited to rebound COVID, strep, viral sinusitis, otitis media, less likely pneumonia     Bilateral TMs are intact and clear.  Posterior pharynx is mildly erythemic.  Lungs are clear bilaterally.  No evidence of respiratory stress or hypoxia.  No suspicion for pneumonia.    Rapid strep and rapid COVID are negative.    Clinical impression is rebound COVID versus other viral URI    DC home.  Symptomatic and supportive care discussed.  Recommended close follow-up with his PCP.  Reasons to return reviewed.  He  verbalized understanding, and agreed with plan of care.  All questions answered.                                Medical Decision Making      Disposition and Plan     Clinical Impression:  1. Viral URI         Disposition:  Discharge  8/31/2024  8:38 am    Follow-up:  Marilyn Mahoney MD  [address] [id number] [phone]    In 1 week            Medications Prescribed:  Current Discharge Medication List

## 2024-08-31 NOTE — DISCHARGE INSTRUCTIONS
Continue antihistamines and Flonase.  Mucinex Insta soothe throat lozenges.  Follow closely with your primary.

## 2024-10-10 ENCOUNTER — OFFICE VISIT (OUTPATIENT)
Dept: ORTHOPEDICS CLINIC | Facility: CLINIC | Age: 49
End: 2024-10-10
Payer: COMMERCIAL

## 2024-10-10 VITALS — BODY MASS INDEX: 28.79 KG/M2 | WEIGHT: 190 LBS | HEIGHT: 68 IN

## 2024-10-10 DIAGNOSIS — G89.29 CHRONIC PAIN OF LEFT KNEE: ICD-10-CM

## 2024-10-10 DIAGNOSIS — M25.562 CHRONIC PAIN OF LEFT KNEE: ICD-10-CM

## 2024-10-10 DIAGNOSIS — M22.42 CHONDROMALACIA OF LEFT PATELLOFEMORAL JOINT: Primary | ICD-10-CM

## 2024-10-10 PROCEDURE — 99214 OFFICE O/P EST MOD 30 MIN: CPT | Performed by: ORTHOPAEDIC SURGERY

## 2024-10-10 PROCEDURE — 3008F BODY MASS INDEX DOCD: CPT | Performed by: ORTHOPAEDIC SURGERY

## 2024-10-10 NOTE — PROGRESS NOTES
[facility] Note    Chief Complaint   Patient presents with    Knee Pain     LEFT KNEE  -Knee pain is worse  -Doesn't feel stable  -Is having pain with stairs       History: The patient is a 49 year old male returning with complaints of persistent and progressive medial left knee pain and instability.  Symptoms began perhaps after a vacation to [facility] but became worse with a long hiking trip to the [facility] in July.  He is now experiencing subjective weakness and instability particularly with stair use.  Pain is now lateral and anterior.  No catching, locking, christopher instability or new swelling is reported.    Past Medical History:    Allergic rhinitis    Carpal tunnel syndrome    CPAP (continuous positive airway pressure) dependence    ETD (eustachian tube dysfunction)    Migraine    Neuropathy    numbness and tingling right hand, ulnar issues    MATT (obstructive sleep apnea)    cpap 10?    MATT (obstructive sleep apnea)    Otalgia of left ear    Persistent disorder of initiating or maintaining wakefulness    Ulnar nerve abnormality    Unspecified sleep apnea    URI (upper respiratory infection)    Visual impairment    wears glasses for distance     Past Surgical History:   Procedure Laterality Date    Adenoidectomy      Cholecystectomy  2015    Colonoscopy      Colonoscopy      Femur/knee surg unlisted      right knee    Hand/finger surgery unlisted      Laparoscopic cholecystectomy N/A 03/04/2015    Procedure: LAPAROSCOPIC CHOLECYSTECTOMY;  Surgeon: Torres Alegre MD;  Location:  MAIN OR    Sinus surgery         Current Outpatient Medications   Medication Sig Dispense Refill    fluticasone propionate 50 MCG/ACT Nasal Suspension by Each Nare route daily.      triamcinolone 0.1 % External Cream Apply topically 2 (two) times daily as needed. 60 g 0    azelastine 0.1 % Nasal Solution 2 sprays by Nasal route 2 (two) times daily.      montelukast 10 MG Oral Tab Take 1 tablet (10 mg total) by mouth  nightly.       No Known Allergies  Family History   Problem Relation Age of Onset    No Known Problems Father     Other (ulcerative colitis) Mother     Other (colon ca) Maternal Grandmother 40    Cancer Maternal Grandfather         breast cancer    Other (MI) Maternal Grandfather     Breast Cancer Paternal Grandmother     Other (lymphoma) Paternal Grandfather     No Known Problems Sister      Social History     Occupational History    Occupation: [employer] management   Tobacco Use    Smoking status: Never     Passive exposure: Never    Smokeless tobacco: Never   Vaping Use    Vaping status: Never Used   Substance and Sexual Activity    Alcohol use: Yes     Comment: 1 beer per month    Drug use: No    Sexual activity: Not on file        ROS:  Complete ROS reviewed by me and non-contributory to the chief complaint except as mentioned above.    Physical Exam:    Ht 5' 8\" (1.727 m)   Wt 190 lb (86.2 kg)   BMI 28.89 kg/m²   Constitutional: Well developed, well nourished, pleasant 49 year old male  Psychological: NAD, alert and appropriate  Respiratory: Breathing comfortably on room air with RR of 10-14  Cardiac: Palpable distal pulses with pink warm extremities distally  On examination there is no apparent swelling or discoloration about the left knee.  There is significant medial joint line tenderness to palpation with a newly painful Aurelio's and Steinmann's test.  Knee ligaments are stable on varus valgus stress with solid endpoints.  Lachman and posterior drawer are normal.  Range of motion testing demonstrates adequate full extension but discomfort on passive hyperflexion.  There is no popliteal tenderness, pain on Eneida's test or distal edema about the foot and ankle.  Distal sensory to light touch, motor function and perfusion are intact.       Imaging Results:   MRI KNEE, LEFT (LIL=81332)     Addendum Date: 1/12/2024    ADDENDUM:  Upon further review subtle signal in the medial meniscus extending to the inferior  articular surface may represent a subtle longitudinal oblique tear (series 501, image 21).  Mild increased T2 signal in the suprapatellar fat pad may represent patellar fat pad impingement.  Correlate clinically.  Dictated by (CST): Amol Awad MD on 1/12/2024 at 2:18 PM     Finalized by (CST): Amol Awad MD on 1/12/2024 at 2:19 PM              Result Date: 1/12/2024  CONCLUSION:  1. Chondral defect involving the central/lateral femoral trochlea with underlying marrow edema and subchondral cyst formation.   LOCATION:  [facility]          Dictated by (CST): Amol Awad MD on 1/12/2024 at 10:32 AM     Finalized by (CST): Amol Awad MD on 1/12/2024 at 1:48 PM        XR KNEE (3 VIEWS), RIGHT (CPT=73562)     Result Date: 12/6/2023  CONCLUSION:  No acute osseous findings.   LOCATION:  [facility]   Dictated by (CST): Nabil Langley MD on 12/06/2023 at 10:53 AM     Finalized by (CST): Nabil Langley MD on 12/06/2023 at 10:54 AM        XR KNEE, COMPLETE (4 OR MORE VIEWS), LEFT (CPT=73564)     Result Date: 12/6/2023  CONCLUSION:  No acute osseous findings.   LOCATION:  [facility]   Dictated by (CST): Nabil Langley MD on 12/06/2023 at 10:52 AM     Finalized by (CST): Nabil Langley MD on 12/06/2023 at 10:53 AM            Assessment: Diagnoses and all orders for this visit:    Diagnoses and all orders for this visit:    Chondromalacia of left patellofemoral joint    Chronic pain of left knee      Plan: We reviewed imaging and exam findings with the patient.  MRI shows chondral lesion at the lateral femoral trochlea.  There is also an addendum to the initial report suggestive of possible increased signal at the posterior horn of the medial meniscus.  His physical exam suggests more symptoms anterior medially and upon my review of the MRI, I have a low suspicion for macro tearing of the medial meniscus.  He continues to demonstrate fairly significant soft tissue tension and contracture at the quadriceps and perhaps even  the IT band.  Most of his symptoms are anterior and lateral away from any suspected medial meniscus tear.  I therefore recommend continued conservative management with activity protection, anti-inflammatories and a dedicated home stretching program.  Specific stretches were outlined and recommended.  If his symptoms do not gradually fade with conservative management reassessment is advised.    Vanessa Sutton MD, FAAOS  Sports Medicine/Knee and Shoulder  [facility]  Phone [phone]  Fax [phone]      This document was partially prepared using Dragon [employer] voice recognition software.  Although every attempt is made to correct errors during dictation, discrepancies may still exist.

## 2024-10-26 LAB
AMB EXT BILIRUBIN, TOTAL: 0.4 MG/DL
AMB EXT BUN: 14 MG/DL
AMB EXT CALCIUM: 9
AMB EXT CARBON DIOXIDE: 20
AMB EXT CHLORIDE: 102
AMB EXT CHOL/HDL RATIO: 4.6
AMB EXT CHOLESTEROL, TOTAL: 172 MG/DL
AMB EXT CMP ALT: 40 U/L
AMB EXT CMP AST: 28 U/L
AMB EXT CREATININE: 1.09 MG/DL
AMB EXT EGFR NON-AA: 82
AMB EXT GLUCOSE: 97 MG/DL
AMB EXT HDL CHOLESTEROL: 37 MG/DL
AMB EXT HEMATOCRIT: 46.4
AMB EXT HEMOGLOBIN: 15.5
AMB EXT HGBA1C: 5.7 %
AMB EXT LDL CHOLESTEROL, DIRECT: 95 MG/DL
AMB EXT MCV: 91
AMB EXT PLATELETS: 265
AMB EXT POSTASSIUM: 4.1 MMOL/L
AMB EXT PSA SCREEN: 0.5 NG/ML
AMB EXT SODIUM: 137 MMOL/L
AMB EXT TOTAL PROTEIN: 6.7
AMB EXT TRIGLYCERIDES: 233 MG/DL
AMB EXT TSH: 2.4 MIU/ML
AMB EXT VITAMIN D, 25-HYDROXY: 19.3
AMB EXT WBC: 7.3 X10(3)UL

## 2024-10-28 ENCOUNTER — TELEPHONE (OUTPATIENT)
Dept: INTERNAL MEDICINE CLINIC | Facility: CLINIC | Age: 49
End: 2024-10-28

## 2024-10-28 DIAGNOSIS — R79.89 LOW VITAMIN D LEVEL: Primary | ICD-10-CM

## 2024-10-28 RX ORDER — ERGOCALCIFEROL 1.25 MG/1
50000 CAPSULE ORAL WEEKLY
Qty: 12 CAPSULE | Refills: 0 | Status: SHIPPED | OUTPATIENT
Start: 2024-10-28

## 2024-10-28 NOTE — TELEPHONE ENCOUNTER
Incoming (mail or fax):  Fax  Received from:  [facility]  Documentation given to:  Triage Results    Lab Results/Report

## 2024-10-28 NOTE — TELEPHONE ENCOUNTER
Labs reviewed.  CMP, CBC, PSA, thyroid are normal.  Vitamin D is low.  Start vitamin D3 50,000 units weekly for 12 weeks and repeat a vitamin D level after.  Also cholesterol is barely elevated for triglycerides.  Low-fat diet, low sugar diet, exercise as able.  Thank you.

## 2024-12-02 ENCOUNTER — OFFICE VISIT (OUTPATIENT)
Dept: INTERNAL MEDICINE CLINIC | Facility: CLINIC | Age: 49
End: 2024-12-02
Payer: COMMERCIAL

## 2024-12-02 VITALS
RESPIRATION RATE: 16 BRPM | HEIGHT: 68 IN | TEMPERATURE: 98 F | BODY MASS INDEX: 30.31 KG/M2 | SYSTOLIC BLOOD PRESSURE: 122 MMHG | HEART RATE: 93 BPM | DIASTOLIC BLOOD PRESSURE: 80 MMHG | WEIGHT: 200 LBS | OXYGEN SATURATION: 97 %

## 2024-12-02 DIAGNOSIS — U07.1 COVID-19: Primary | ICD-10-CM

## 2024-12-02 LAB
CONTROL LINE PRESENT WITH A CLEAR BACKGROUND (YES/NO): YES YES/NO
COVID19 BINAX NOW ANTIGEN: DETECTED
KIT LOT #: NORMAL NUMERIC
OPERATOR ID: ABNORMAL
POCT LOT NUMBER: ABNORMAL
STREP GRP A CUL-SCR: NEGATIVE

## 2024-12-02 PROCEDURE — 87081 CULTURE SCREEN ONLY: CPT | Performed by: NURSE PRACTITIONER

## 2024-12-02 RX ORDER — ALBUTEROL SULFATE 90 UG/1
2 INHALANT RESPIRATORY (INHALATION) EVERY 4 HOURS PRN
Qty: 1 EACH | Refills: 0 | Status: SHIPPED | OUTPATIENT
Start: 2024-12-02

## 2024-12-02 NOTE — PROGRESS NOTES
Kelton Ba is a 49 year old male.  CHIEF COMPLAINT   Viral symptoms    HPI:   Symptoms started on Friday with sinus and ear congestion, sore throat, chest congestion, feels like it is harder to take a deep breath in without labored breathing, fatigue.     No labored breathing, n/v, diarrhea, poor appetite, loss of smell or taste, rashes anywhere.    Did not do a covid test yet.    Current Outpatient Medications   Medication Sig Dispense Refill    Vitamin D, Ergocalciferol, 81293 units Oral Cap Take 50,000 Units by mouth once a week. 12 capsule 0    azelastine 0.1 % Nasal Solution 2 sprays by Nasal route 2 (two) times daily.      montelukast 10 MG Oral Tab Take 1 tablet (10 mg total) by mouth nightly.        Past Medical History:    Allergic rhinitis    Arthritis    Carpal tunnel syndrome    CPAP (continuous positive airway pressure) dependence    ETD (eustachian tube dysfunction)    Migraine    Neuropathy    numbness and tingling right hand, ulnar issues    MATT (obstructive sleep apnea)    cpap 10?    MATT (obstructive sleep apnea)    Otalgia of left ear    Persistent disorder of initiating or maintaining wakefulness    Ulnar nerve abnormality    Unspecified sleep apnea    URI (upper respiratory infection)    Visual impairment    wears glasses for distance      Social History:  Social History     Socioeconomic History    Marital status:    Occupational History    Occupation: sales management   Tobacco Use    Smoking status: Never     Passive exposure: Never    Smokeless tobacco: Never   Vaping Use    Vaping status: Never Used   Substance and Sexual Activity    Alcohol use: Yes     Comment: Maybe 1/month    Drug use: No   Other Topics Concern    Caffeine Concern Yes     Comment: Wonder if it may be tied to palpitations    Stress Concern No    Weight Concern No    Special Diet No    Exercise Yes    Seat Belt Yes        REVIEW OF SYSTEMS:   See HPI     EXAM:     /80 (BP Location: Left arm, Patient  Position: Sitting, Cuff Size: large)   Pulse 93   Temp 98 °F (36.7 °C) (Temporal)   Resp 16   Ht 5' 8\" (1.727 m)   Wt 200 lb (90.7 kg)   SpO2 97%   BMI 30.41 kg/m²   Body mass index is 30.41 kg/m².   GENERAL: well developed, well nourished,in no apparent distress  HEENT: atraumatic, normocephalic, right ear with mild erythema and bulging, no sinus pain or pressure, erythema to throat.   EYES:  conjunctiva are clear  LUNGS: clear to auscultation; no rhonchi, rales, or wheezing  CARDIO: RRR without murmur  GI: no masses, organomegaly or tenderness  MUSCULOSKELETAL: No obvious joint deformity or swelling.  Normal gait.  EXTREMITIES: no edema or calve tenderness  NEURO: Oriented times three      LABS:      Lab Results   Component Value Date    WBC 7.3 10/26/2024    RBC 4.83 04/03/2023    HGB 15.5 10/26/2024    HCT 46.4 10/26/2024    MCV 91 10/26/2024    MCH 30.2 04/03/2023    MCHC 34.9 04/03/2023    RDW 12.7 04/03/2023     10/26/2024      Lab Results   Component Value Date    GLU 97 10/26/2024    BUN 14 10/26/2024    BUNCREA 16.0 07/28/2021    CREATSERUM 1.09 10/26/2024    ANIONGAP 5 04/03/2023    GFR 81 06/30/2017    GFRNAA 82 10/26/2024    GFRAA 102 07/28/2021    CA 9.0 10/26/2024    OSMOCALC 293 04/03/2023    ALKPHO 105 04/03/2023    AST 28 10/26/2024    ALT 40 10/26/2024    BILT 0.4 10/26/2024    TP 6.7 10/26/2024    ALB 3.6 04/03/2023    GLOBULIN 3.4 04/03/2023    AGRATIO 2.0 08/30/2013     04/03/2023    K 4.1 10/26/2024     10/26/2024    CO2 20 10/26/2024      Lab Results   Component Value Date    CHOLEST 172 10/26/2024    TRIG 233 10/26/2024    HDL 37 10/26/2024    LDL 80 07/28/2021    VLDL 31 (H) 07/28/2021    TCHDLRATIO 4.60 10/26/2024    NONHDLC 113 07/28/2021      Lab Results   Component Value Date    TSH 2.400 10/26/2024      Lab Results   Component Value Date     07/28/2021    A1C 5.7 10/26/2024      COVID test 12/2/24- positive    ASSESSMENT AND PLAN:   1. COVID-19  - pt  tested positive for COVID. Strep also tested  - inhaler and paxlovid sent to pharmacy  - supportive care and isolation discussed  - to ER for emergent symptoms, may call office back for sec ear or sinus infection as needed   - albuterol (PROAIR HFA) 108 (90 Base) MCG/ACT Inhalation Aero Soln; Inhale 2 puffs into the lungs every 4 (four) hours as needed for Wheezing or Shortness of Breath.  Dispense: 1 each; Refill: 0  - nirmatrelvir-ritonavir 300-100 MG Oral Tablet Therapy Pack; Take two nirmatrelvir tablets (300mg) with one ritonavir tablet (100mg) together twice daily for 5 days.  Dispense: 30 tablet; Refill: 0      The patient indicates understanding of these issues and agrees to the plan.  The patient is asked to return as needed or when routine care is due.

## 2024-12-02 NOTE — PATIENT INSTRUCTIONS
Keep well hydrated    Start the paxlovid and monitor for side effects, effectiveness, and allergy.    Isolate per CDC guidelines.      You can use robitussin DM or mucinex DM as directed on the bottle for cough and chest congestion.      Use cepacol for sore throat and dry cough as needed.      Use tylenol as needed for pain or fever    Go to the ER for any emergent symptoms. If you cannot get there safely call 911.      If the symptoms persist > 7-10 days and you have sinus or ear pain you can call the office back for an antibiotic if needed at that time.    Follow up as needed or when routine care is due  Viral Upper Respiratory Illness (Adult)    You have a viral upper respiratory illness (URI), which is another term for the common cold. This illness is contagious during the first few days. It is spread through the air by coughing and sneezing. It may also be spread by direct contact (touching the sick person and then touching your own eyes, nose, or mouth). Frequent handwashing will decrease risk of spread. Most viral illnesses go away within 7 to 10 days with rest and simple home remedies. Sometimes the illness may last for several weeks. Antibiotics will not kill a virus, and they are generally not prescribed for this condition.  Home care  If symptoms are severe, rest at home for the first 2 to 3 days. When you resume activity, don't let yourself get too tired.  Don't smoke. If you need help stopping, talk with your healthcare provider.  Avoid being exposed to cigarette smoke (yours or others’).  You may use acetaminophen or ibuprofen to control pain and fever, unless another medicine was prescribed. If you have chronic liver or kidney disease, have ever had a stomach ulcer or gastrointestinal bleeding, or are taking blood-thinning medicines, talk with your healthcare provider before using these medicines. Aspirin should never be given to anyone under 18 years of age who is ill with a viral infection or  fever. It may cause severe liver or brain damage.  Your appetite may be poor, so a light diet is fine. Stay well hydrated by drinking 6 to 8 glasses of fluids per day (water, soft drinks, juices, tea, or soup). Extra fluids will help loosen secretions in the nose and lungs.  Over-the-counter cold medicines will not shorten the length of time you’re sick, but they may be helpful for the following symptoms: cough, sore throat, and nasal and sinus congestion. If you take prescription medicines, ask your healthcare provider or pharmacist which over-the-counter medicines are safe to use. (Note: Don't use decongestants if you have high blood pressure.)  Follow-up care  Follow up with your healthcare provider, or as advised.  When to seek medical advice  Call your healthcare provider right away if any of these occur:  Cough with lots of colored sputum (mucus)  Severe headache; face, neck, or ear pain  Difficulty swallowing due to throat pain  Fever of 100.4°F (38°C) or higher, or as directed by your healthcare provider  Call 911  Call 911 if any of these occur:  Chest pain, shortness of breath, wheezing, or difficulty breathing  Coughing up blood  Very severe pain with swallowing, especially if it goes along with a muffled voice   Date Last Reviewed: 6/1/2018  © 1174-9966 [employer]. [address] [place] [address], [place] [address]. All rights reserved. This information is not intended as a substitute for professional medical care. Always follow your healthcare professional's instructions.

## 2025-01-20 ENCOUNTER — PATIENT MESSAGE (OUTPATIENT)
Dept: ORTHOPEDICS CLINIC | Facility: CLINIC | Age: 50
End: 2025-01-20

## 2025-01-20 DIAGNOSIS — M25.362 INSTABILITY OF LEFT KNEE JOINT: ICD-10-CM

## 2025-01-20 DIAGNOSIS — M22.42 CHONDROMALACIA OF LEFT PATELLOFEMORAL JOINT: Primary | ICD-10-CM

## 2025-01-21 NOTE — TELEPHONE ENCOUNTER
Vanessa Sutton MD to Sarah Flowers MA  [facility] Clinical Pool  Delores Foster PA-C       1/20/25  5:42 PM  OK with PT: E&T.  ROM stretching, light CKC VMO and Glut strengthening,HEP 2x/wk for 4 wks.

## 2025-01-28 ENCOUNTER — TELEPHONE (OUTPATIENT)
Dept: PHYSICAL THERAPY | Facility: HOSPITAL | Age: 50
End: 2025-01-28

## 2025-02-12 ENCOUNTER — HOSPITAL ENCOUNTER (OUTPATIENT)
Age: 50
Discharge: HOME | End: 2025-02-12
Payer: COMMERCIAL

## 2025-02-12 ENCOUNTER — APPOINTMENT (OUTPATIENT)
Dept: GENERAL RADIOLOGY | Age: 50
End: 2025-02-12
Attending: PHYSICIAN ASSISTANT
Payer: COMMERCIAL

## 2025-02-12 VITALS
OXYGEN SATURATION: 97 % | HEART RATE: 50 BPM | DIASTOLIC BLOOD PRESSURE: 92 MMHG | SYSTOLIC BLOOD PRESSURE: 134 MMHG | TEMPERATURE: 98 F | RESPIRATION RATE: 18 BRPM

## 2025-02-12 DIAGNOSIS — M25.521 RIGHT ELBOW PAIN: Primary | ICD-10-CM

## 2025-02-12 PROCEDURE — 73080 X-RAY EXAM OF ELBOW: CPT | Performed by: PHYSICIAN ASSISTANT

## 2025-02-12 PROCEDURE — 99213 OFFICE O/P EST LOW 20 MIN: CPT | Performed by: PHYSICIAN ASSISTANT

## 2025-02-12 NOTE — ED INITIAL ASSESSMENT (HPI)
Pt fell 1 week ago when he slipped on ice.  He hurt his rt arm and now his elbow to his shoulder is painful

## 2025-02-12 NOTE — ED PROVIDER NOTES
Patient Seen in: [facility]      History     Chief Complaint   Patient presents with    Fall     Stated Complaint: Fall    Subjective:   HPI      Patient is a 49-year-old male that presents to immediate care due to right elbow pain after mechanical fall 1 week ago.  Patient states that he slipped on ice landing on his right elbow.  States that pain has been persistent.  Denies any at-home treatment.  Denies paresthesias limited range of motion.    Objective:     Past Medical History:    Allergic rhinitis    Arthritis    Carpal tunnel syndrome    CPAP (continuous positive airway pressure) dependence    ETD (eustachian tube dysfunction)    Migraine    Neuropathy    numbness and tingling right hand, ulnar issues    MATT (obstructive sleep apnea)    cpap 10?    MATT (obstructive sleep apnea)    Otalgia of left ear    Persistent disorder of initiating or maintaining wakefulness    Ulnar nerve abnormality    Unspecified sleep apnea    URI (upper respiratory infection)    Visual impairment    wears glasses for distance              Past Surgical History:   Procedure Laterality Date    Adenoidectomy      Cholecystectomy  2015    Colonoscopy      Colonoscopy      Femur/knee surg unlisted      right knee    Hand/finger surgery unlisted      Laparoscopic cholecystectomy N/A 03/04/2015    Procedure: LAPAROSCOPIC CHOLECYSTECTOMY;  Surgeon: Torres Alegre MD;  Location:  [facility] OR    Other surgical history  3/9/23    Trigger finger, right hand middle finger    Sinus surgery                    Social History     Socioeconomic History    Marital status:    Occupational History    Occupation: sales management   Tobacco Use    Smoking status: Never     Passive exposure: Never    Smokeless tobacco: Never   Vaping Use    Vaping status: Never Used   Substance and Sexual Activity    Alcohol use: Yes     Comment: Maybe 1/month    Drug use: No   Other Topics Concern    Caffeine Concern Yes     Comment:  Wonder if it may be tied to palpitations    Stress Concern No    Weight Concern No    Special Diet No    Exercise Yes    Seat Belt Yes              Review of Systems    Positive for stated complaint: Fall  Other systems are as noted in HPI.  Constitutional and vital signs reviewed.      All other systems reviewed and negative except as noted above.    Physical Exam     ED Triage Vitals [02/12/25 0948]   BP (!) 134/92   Pulse 50   Resp 18   Temp 98 °F (36.7 °C)   Temp src Oral   SpO2 97 %   O2 Device None (Room air)       Current Vitals:   Vital Signs  BP: (!) 134/92  Pulse: 50  Resp: 18  Temp: 98 °F (36.7 °C)  Temp src: Oral    Oxygen Therapy  SpO2: 97 %  O2 Device: None (Room air)        Physical Exam  Vital signs reviewed. Nursing note reviewed.  Constitutional: Well-developed. Well-nourished. In no acute distress  HENT: Mucous membranes moist.   EYES: No scleral icterus or conjunctival injection.  NECK: Full ROM. Supple.   CARDIAC: Normal rate. Normal S1/ S2  PULM/CHEST:  No wheezes  Extremities: Full ROM of right shoulder.  Full range of motion of right elbow.  Point tenderness palpation of olecranon process.  No overlying edema ecchymosis lacerations.  NEURO: Awake, alert, following commands, moving extremities, answering questions.   SKIN: Warm and dry. No rash or lesions.  PSYCH: Normal judgment. Normal affect.             MDM      Patient is a 49-year-old male who presents to immediate care due to right elbow pain after mechanical fall 1 week ago.  Patient arrives with stable vitals.  Physical exam showing full range of motion strength of right upper extremity with point tenderness ovation of right olecranon process.  Most likely elbow contusion.  X-ray images performed of right elbow showing no acute fracture or dislocation.  Discussed RICE.  Take Tylenol and ibuprofen as needed for pain.  History given by patient.        Medical Decision Making      Disposition and Plan     Clinical Impression:  1. Right  elbow pain         Disposition:  Discharge  2/12/2025 10:27 am    Follow-up:  Marilyn Mahoney MD  [address] [id number] [phone]    Call             Medications Prescribed:  Discharge Medication List as of 2/12/2025 10:28 AM              Supplementary Documentation:

## 2025-02-13 ENCOUNTER — TELEPHONE (OUTPATIENT)
Dept: PHYSICAL THERAPY | Facility: HOSPITAL | Age: 50
End: 2025-02-13

## 2025-02-19 ENCOUNTER — OFFICE VISIT (OUTPATIENT)
Dept: PHYSICAL THERAPY | Age: 50
End: 2025-02-19
Attending: PHYSICIAN ASSISTANT
Payer: COMMERCIAL

## 2025-02-19 DIAGNOSIS — M22.42 CHONDROMALACIA OF LEFT PATELLOFEMORAL JOINT: Primary | ICD-10-CM

## 2025-02-19 DIAGNOSIS — M25.362 INSTABILITY OF LEFT KNEE JOINT: ICD-10-CM

## 2025-02-19 PROCEDURE — 97140 MANUAL THERAPY 1/> REGIONS: CPT

## 2025-02-19 PROCEDURE — 97161 PT EVAL LOW COMPLEX 20 MIN: CPT

## 2025-02-19 NOTE — PROGRESS NOTES
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  History of current condition: Insidious onset in 2023, denies trauma prior to onset. Gradually worsened over time.   Pain level: current 0 /10, at best 0 /10, at worst 6 /10 (with squats/lunges)  Description of symptoms: sensation of giving way   Leisure activities/Hobbies: enjoys hiking, outdoors activities   Prior level of function: able to complete ADLs and recreational activities without pain and knee buckling  Current limitations: unable to ascend/descend stairs/ladder, pivotting when running, not able to perform normal exercise routine (lunges, squat, LE resistance training).  Pt goals: reduce buckling and return to normal hiking/workout routine.  Past medical history was reviewed with Kelton.  Significant findings include: history of multiple trigger fingers, carpal tunnel bilateral hands, right knee scope 1996.  Imaging/Tests: recent MRI and X-Ray (see imaging report in Epic).   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  He  has a past surgical history that includes sinus surgery  ; adenoidectomy;  femur/knee surg unlisted; Laparoscopic cholecystectomy (N/A, 03/04/2015); colonoscopy; colonoscopy; cholecystectomy (2015); hand/finger surgery unlisted; and other surgical history (3/9/23).    ASSESSMENT  Kelton presents to physical therapy evaluation with primary c/o left knee sensation of instability/buckling when ascending/descending stairs.. The results of the objective tests and measures show painful PFJ loading with compression and squat, alleviated knee pain during squat with compression just superior to patella, mild reduction in pain following PFJ accessory mobilization. Functional deficits include but are not limited to unable to ascend/descend stairs/ladder, pivotting when running, not able to perform normal exercise routine (lunges, squat, LE resistance training).. 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 Pt and PT discussed evaluation findings, pathology, POC and HEP.  Pt voiced understanding and performs HEP correctly without reported pain. Skilled Physical Therapy is medically necessary to address the above impairments and reach functional goals.    OBJECTIVE:   Musculoskeletal  Observation: No visible swelling, discoloration, deformity to left knee  Palpation: tenderness along superior border of patella, left TFJ lateral joint line   Accessory Motion: PFJ: L hypomobile sup/inf glide; TFJ: L WNL        ANDREW ROM WNL and Strength (5/5) except below:  (* denotes performed with pain)  Knee   ROM MMT (-/5)    R L R L     Flex 135 135 5 5     Ext (L3) 0 0 5 5     Neurological:  Sensation: Intact light touch around left knee     Balance and Functional Mobility:  Gait: pt ambulates on level ground with normal mechanics   Squat: painful at 60 deg (6/10 pain) (Pain with squat alleviated with compression superior to patella)     Today's Treatment and Response:   Pt education was provided on exam findings,  treatment diagnosis, treatment plan, expectations, and prognosis.  Today's Treatment       2/19/2025   PT Treatment   Manual Therapy -PFJ inferior glide gr III x8 min   Manual Therapy Min 10   Total of Timed Procedures 10   Total of Service Based 0   Total Treatment Time 10         Patient was instructed in and issued a HEP for: -Leg extension machine, 10-15#, 3 sets, 10 reps, 2 days/ week    Charges:  PT EVAL: Low Complexity, Manual x1  In agreement with evaluation findings and clinical rationale, this evaluation involved LOW COMPLEXITY decision making due to no personal factors/comorbidities, 1-2 body structures involved/activity limitations, and stable symptoms as documented in the evaluation.                                                                                                                PLAN OF CARE:    Goals: (to be met in 8 visits)   Goals       Therapy Goals     Perform a squat >60 deg without knee pain (5 visits)  Perform a 8\" step up without sensation of knee buckling (8 visits)  Perform a full flight of stairs without sensation of knee buckling (8 visits)              Frequency / Duration: Patient will be seen 2x/week or a total of 8  visits over a 90 day period. Treatment will include: Manual Therapy; Neuromuscular Re-education; Therapeutic Activities; Therapeutic Exercise; Home Exercise Program instruction; Electrical stimulation (attended); Electrical stimulation (unattended)    Education or treatment limitation: None   Rehab Potential: good     Patient was advised of these findings, precautions, and treatment options and has agreed to actively participate in planning and for this course of care.    Thank you for your referral. Please co-sign or sign and return this letter via fax as soon as possible to [phone]. If you have any questions, please contact me at Dept: [phone]    Sincerely,  Electronically signed by therapist: Sherif Mendoza, PT, DPT  Physician's certification  required: Yes  I certify the need for these services furnished under this plan of treatment and while under my care.    X___________________________________________________ Date____________________    Certification From: 2/19/2025  To:5/20/2025

## 2025-02-24 ENCOUNTER — APPOINTMENT (OUTPATIENT)
Dept: PHYSICAL THERAPY | Age: 50
End: 2025-02-24
Attending: PHYSICIAN ASSISTANT
Payer: COMMERCIAL

## 2025-02-24 ENCOUNTER — OFFICE VISIT (OUTPATIENT)
Dept: INTERNAL MEDICINE CLINIC | Facility: CLINIC | Age: 50
End: 2025-02-24
Payer: COMMERCIAL

## 2025-02-24 VITALS
BODY MASS INDEX: 30.77 KG/M2 | HEIGHT: 68 IN | WEIGHT: 203 LBS | SYSTOLIC BLOOD PRESSURE: 130 MMHG | OXYGEN SATURATION: 97 % | RESPIRATION RATE: 16 BRPM | HEART RATE: 87 BPM | TEMPERATURE: 98 F | DIASTOLIC BLOOD PRESSURE: 82 MMHG

## 2025-02-24 DIAGNOSIS — B34.9 VIRAL SYNDROME: Primary | ICD-10-CM

## 2025-02-24 LAB
CONTROL LINE PRESENT WITH A CLEAR BACKGROUND (YES/NO): YES YES/NO
COVID19 BINAX NOW ANTIGEN: NOT DETECTED
KIT LOT #: NORMAL NUMERIC
OPERATOR ID: NORMAL
STREP GRP A CUL-SCR: NEGATIVE

## 2025-02-24 PROCEDURE — 87081 CULTURE SCREEN ONLY: CPT | Performed by: NURSE PRACTITIONER

## 2025-02-24 PROCEDURE — 87077 CULTURE AEROBIC IDENTIFY: CPT | Performed by: NURSE PRACTITIONER

## 2025-02-25 NOTE — PATIENT INSTRUCTIONS
Keep well hydrated     You can use robitussin DM or mucinex DM as directed on the bottle for cough and chest congestion.      Use cepacol for sore throat and dry cough as needed.      Use tylenol as needed for pain or fever    Flonase or nasal spray    Deep breathing    Hydrate well    Call the office back if your sinus symptoms continue > 7 days for an abt as needed     Follow up as needed or when routine care is due  Viral Upper Respiratory Illness (Adult)    You have a viral upper respiratory illness (URI), which is another term for the common cold. This illness is contagious during the first few days. It is spread through the air by coughing and sneezing. It may also be spread by direct contact (touching the sick person and then touching your own eyes, nose, or mouth). Frequent handwashing will decrease risk of spread. Most viral illnesses go away within 7 to 10 days with rest and simple home remedies. Sometimes the illness may last for several weeks. Antibiotics will not kill a virus, and they are generally not prescribed for this condition.  Home care  If symptoms are severe, rest at home for the first 2 to 3 days. When you resume activity, don't let yourself get too tired.  Don't smoke. If you need help stopping, talk with your healthcare provider.  Avoid being exposed to cigarette smoke (yours or others’).  You may use acetaminophen or ibuprofen to control pain and fever, unless another medicine was prescribed. If you have chronic liver or kidney disease, have ever had a stomach ulcer or gastrointestinal bleeding, or are taking blood-thinning medicines, talk with your healthcare provider before using these medicines. Aspirin should never be given to anyone under 18 years of age who is ill with a viral infection or fever. It may cause severe liver or brain damage.  Your appetite may be poor, so a light diet is fine. Stay well hydrated by drinking 6 to 8 glasses of fluids per day (water, soft drinks, juices,  tea, or soup). Extra fluids will help loosen secretions in the nose and lungs.  Over-the-counter cold medicines will not shorten the length of time you’re sick, but they may be helpful for the following symptoms: cough, sore throat, and nasal and sinus congestion. If you take prescription medicines, ask your healthcare provider or pharmacist which over-the-counter medicines are safe to use. (Note: Don't use decongestants if you have high blood pressure.)  Follow-up care  Follow up with your healthcare provider, or as advised.  When to seek medical advice  Call your healthcare provider right away if any of these occur:  Cough with lots of colored sputum (mucus)  Severe headache; face, neck, or ear pain  Difficulty swallowing due to throat pain  Fever of 100.4°F (38°C) or higher, or as directed by your healthcare provider  Call 911  Call 911 if any of these occur:  Chest pain, shortness of breath, wheezing, or difficulty breathing  Coughing up blood  Very severe pain with swallowing, especially if it goes along with a muffled voice   Date Last Reviewed: 6/1/2018  © 9541-2688 [employer]. [address] [place] [address]. All rights reserved. This information is not intended as a substitute for professional medical care. Always follow your healthcare professional's instructions.

## 2025-02-25 NOTE — PROGRESS NOTES
Kelton Ba is a 49 year old male.  CHIEF COMPLAINT   Viral syndrome    HPI:   Symptoms started Friday with runny and stuffy nose, cough, chest congestion and sore throat. No sob.     Current Outpatient Medications   Medication Sig Dispense Refill    azelastine 0.1 % Nasal Solution 2 sprays by Nasal route 2 (two) times daily.      montelukast 10 MG Oral Tab Take 1 tablet (10 mg total) by mouth nightly.        Past Medical History:    Allergic rhinitis    Arthritis    Carpal tunnel syndrome    CPAP (continuous positive airway pressure) dependence    ETD (eustachian tube dysfunction)    Migraine    Neuropathy    numbness and tingling right hand, ulnar issues    MATT (obstructive sleep apnea)    cpap 10?    MATT (obstructive sleep apnea)    Otalgia of left ear    Persistent disorder of initiating or maintaining wakefulness    Ulnar nerve abnormality    Unspecified sleep apnea    URI (upper respiratory infection)    Visual impairment    wears glasses for distance      Social History:  Social History     Socioeconomic History    Marital status:    Occupational History    Occupation: [employer] management   Tobacco Use    Smoking status: Never     Passive exposure: Never    Smokeless tobacco: Never   Vaping Use    Vaping status: Never Used   Substance and Sexual Activity    Alcohol use: Yes     Comment: Maybe 1/month    Drug use: No   Other Topics Concern    Caffeine Concern Yes     Comment: Wonder if it may be tied to palpitations    Stress Concern No    Weight Concern No    Special Diet No    Exercise Yes    Seat Belt Yes        REVIEW OF SYSTEMS:   See HPI     EXAM:     /82 (BP Location: Left arm, Patient Position: Sitting, Cuff Size: large)   Pulse 87   Temp 97.6 °F (36.4 °C) (Temporal)   Resp 16   Ht 5' 8\" (1.727 m)   Wt 203 lb (92.1 kg)   SpO2 97%   BMI 30.87 kg/m²   Body mass index is 30.87 kg/m².   GENERAL: well developed, well nourished,in no apparent distress  HEENT: atraumatic,  normocephalic,ears are clear- right TM with cerumen, no sinus pain  EYES: conjunctiva are clear  NECK: supple,no adenopathy    LUNGS: clear to auscultation; no rhonchi, rales, or wheezing  CARDIO: RRR without murmur  GI:  no masses, organomegaly or tenderness  MUSCULOSKELETAL:    Normal gait.  EXTREMITIES: no edema  NEURO: Oriented times three     LABS:      Lab Results   Component Value Date    WBC 7.3 10/26/2024    RBC 4.83 04/03/2023    HGB 15.5 10/26/2024    HCT 46.4 10/26/2024    MCV 91 10/26/2024    MCH 30.2 04/03/2023    MCHC 34.9 04/03/2023    RDW 12.7 04/03/2023     10/26/2024      Lab Results   Component Value Date    GLU 97 10/26/2024    BUN 14 10/26/2024    BUNCREA 16.0 07/28/2021    CREATSERUM 1.09 10/26/2024    ANIONGAP 5 04/03/2023    GFR 81 06/30/2017    GFRNAA 82 10/26/2024    GFRAA 102 07/28/2021    CA 9.0 10/26/2024    OSMOCALC 293 04/03/2023    ALKPHO 105 04/03/2023    AST 28 10/26/2024    ALT 40 10/26/2024    BILT 0.4 10/26/2024    TP 6.7 10/26/2024    ALB 3.6 04/03/2023    GLOBULIN 3.4 04/03/2023    AGRATIO 2.0 08/30/2013     04/03/2023    K 4.1 10/26/2024     10/26/2024    CO2 20 10/26/2024      Lab Results   Component Value Date    CHOLEST 172 10/26/2024    TRIG 233 10/26/2024    HDL 37 10/26/2024    LDL 80 07/28/2021    VLDL 31 (H) 07/28/2021    TCHDLRATIO 4.60 10/26/2024    NONHDLC 113 07/28/2021      Lab Results   Component Value Date    TSH 2.400 10/26/2024      Lab Results   Component Value Date     07/28/2021    A1C 5.7 10/26/2024        IMAGING:     XR ELBOW, COMPLETE (MIN 3 VIEWS), RIGHT (CPT=73080)    Result Date: 2/12/2025  PROCEDURE:  XR ELBOW, COMPLETE (MIN 3 VIEWS), RIGHT (CPT=73080)  TECHNIQUE:  Three views were obtained.  COMPARISON:  None.  INDICATIONS:  Elbow pain  PATIENT STATED HISTORY: (As transcribed by Technologist)  Fell on elbow 1 week ago.   FINDINGS:  No evidence of acute displaced fracture or dislocation.  Normal mineralization.   Unremarkable soft tissues.  Mild enthesopathy along the lateral humeral epicondyle.            CONCLUSION:  No evidence of acute displaced fracture or dislocation in the right elbow.  LOCATION:  [facility]    Dictated by (CST): Jabier Mayne MD on 2/12/2025 at 10:20 AM     Finalized by (CST): Jabier Mayen MD on 2/12/2025 at 10:21 AM          ASSESSMENT AND PLAN:   1. Viral syndrome  - covid and strep negative. No sob.  - start supportive care  - call the office back for abt if sinus symptoms > 7days  - to ER as needed   - Grp A Strep Cult, Throat; Future  - Strep A Assay W/Optic  - COVID19 BinaxNOW Antigen  - Grp A Strep Cult, Throat      The patient indicates understanding of these issues and agrees to the plan.  The patient is asked to return as needed or when routine care is due.

## 2025-02-26 ENCOUNTER — APPOINTMENT (OUTPATIENT)
Dept: PHYSICAL THERAPY | Age: 50
End: 2025-02-26
Attending: PHYSICIAN ASSISTANT
Payer: COMMERCIAL

## 2025-02-27 ENCOUNTER — OFFICE VISIT (OUTPATIENT)
Dept: INTERNAL MEDICINE CLINIC | Facility: CLINIC | Age: 50
End: 2025-02-27
Payer: COMMERCIAL

## 2025-02-27 ENCOUNTER — HOSPITAL ENCOUNTER (OUTPATIENT)
Dept: GENERAL RADIOLOGY | Age: 50
Discharge: HOME | End: 2025-02-27
Attending: NURSE PRACTITIONER
Payer: COMMERCIAL

## 2025-02-27 VITALS
TEMPERATURE: 97 F | SYSTOLIC BLOOD PRESSURE: 128 MMHG | HEIGHT: 68 IN | HEART RATE: 88 BPM | BODY MASS INDEX: 30.71 KG/M2 | OXYGEN SATURATION: 97 % | RESPIRATION RATE: 16 BRPM | DIASTOLIC BLOOD PRESSURE: 80 MMHG | WEIGHT: 202.63 LBS

## 2025-02-27 DIAGNOSIS — J02.0 STREP PHARYNGITIS: Primary | ICD-10-CM

## 2025-02-27 DIAGNOSIS — J02.0 STREP PHARYNGITIS: ICD-10-CM

## 2025-02-27 DIAGNOSIS — R09.89 LUNG CRACKLES: ICD-10-CM

## 2025-02-27 PROCEDURE — 3079F DIAST BP 80-89 MM HG: CPT | Performed by: NURSE PRACTITIONER

## 2025-02-27 PROCEDURE — 3008F BODY MASS INDEX DOCD: CPT | Performed by: NURSE PRACTITIONER

## 2025-02-27 PROCEDURE — 99214 OFFICE O/P EST MOD 30 MIN: CPT | Performed by: NURSE PRACTITIONER

## 2025-02-27 PROCEDURE — 71046 X-RAY EXAM CHEST 2 VIEWS: CPT | Performed by: NURSE PRACTITIONER

## 2025-02-27 PROCEDURE — 3074F SYST BP LT 130 MM HG: CPT | Performed by: NURSE PRACTITIONER

## 2025-02-27 PROCEDURE — G2211 COMPLEX E/M VISIT ADD ON: HCPCS | Performed by: NURSE PRACTITIONER

## 2025-02-27 RX ORDER — INHALER, ASSIST DEVICES
SPACER (EA) MISCELLANEOUS
Qty: 1 EACH | Refills: 0 | Status: SHIPPED | OUTPATIENT
Start: 2025-02-27

## 2025-02-27 RX ORDER — ALBUTEROL SULFATE 90 UG/1
2 INHALANT RESPIRATORY (INHALATION) EVERY 4 HOURS PRN
Qty: 1 EACH | Refills: 0 | Status: SHIPPED | OUTPATIENT
Start: 2025-02-27

## 2025-02-27 NOTE — PROGRESS NOTES
Kelton Ba is a 49 year old male.  CHIEF COMPLAINT   Strep     HPI:   Symptoms started Friday with runny and stuffy nose, cough, chest congestion and sore throat. No sob. Was covid negative and rapid strep negative. Was advised to do supportive care and the throat culture was sent.    Is not feeling much better. Sputum is more yellow and clear then green like before but still with a lot of chest congestion. No sob but 02 sat has been 92-94% which is lower for him. Left ear is starting to hurt. His strep throat culture came back positive this am.     Current Outpatient Medications   Medication Sig Dispense Refill    benzonatate 100 MG Oral Cap Take 1 capsule (100 mg total) by mouth 3 (three) times daily as needed for cough. 30 capsule 0    amoxicillin clavulanate 875-125 MG Oral Tab Take 1 tablet by mouth 2 (two) times daily for 10 days. 20 tablet 0    azelastine 0.1 % Nasal Solution 2 sprays by Nasal route 2 (two) times daily.      montelukast 10 MG Oral Tab Take 1 tablet (10 mg total) by mouth nightly.        Past Medical History:    Allergic rhinitis    Arthritis    Carpal tunnel syndrome    CPAP (continuous positive airway pressure) dependence    ETD (eustachian tube dysfunction)    Migraine    Neuropathy    numbness and tingling right hand, ulnar issues    MATT (obstructive sleep apnea)    cpap 10?    MATT (obstructive sleep apnea)    Otalgia of left ear    Persistent disorder of initiating or maintaining wakefulness    Ulnar nerve abnormality    Unspecified sleep apnea    URI (upper respiratory infection)    Visual impairment    wears glasses for distance      Social History:  Social History     Socioeconomic History    Marital status:    Occupational History    Occupation: sales management   Tobacco Use    Smoking status: Never     Passive exposure: Never    Smokeless tobacco: Never   Vaping Use    Vaping status: Never Used   Substance and Sexual Activity    Alcohol use: Yes     Comment: Maybe  1/month    Drug use: No   Other Topics Concern    Caffeine Concern Yes     Comment: Wonder if it may be tied to palpitations    Stress Concern No    Weight Concern No    Special Diet No    Exercise Yes    Seat Belt Yes        REVIEW OF SYSTEMS:   See HPI     EXAM:     /80   Pulse 88   Temp 97.3 °F (36.3 °C) (Temporal)   Resp 16   Ht 5' 8\" (1.727 m)   Wt 202 lb 9.6 oz (91.9 kg)   SpO2 97%   BMI 30.81 kg/m²   Body mass index is 30.81 kg/m².   GENERAL: well developed, well nourished,in no apparent distress  HEENT: atraumatic, normocephalic,ears are clear- right TM with partial cerumen but TM clear, left TM bulging with no erythema, no sinus pain, erythema to throat with mild tonsillitis without exudate  EYES: conjunctiva are clear  NECK: supple,no adenopathy    LUNGS: clear to auscultation to right lung and left upper lung, left lower lung with crackles  CARDIO: RRR without murmur  GI:  no masses, organomegaly or tenderness  MUSCULOSKELETAL:  Normal gait.  EXTREMITIES: no edema  NEURO: Oriented times three     LABS:      Lab Results   Component Value Date    WBC 7.3 10/26/2024    RBC 4.83 04/03/2023    HGB 15.5 10/26/2024    HCT 46.4 10/26/2024    MCV 91 10/26/2024    MCH 30.2 04/03/2023    MCHC 34.9 04/03/2023    RDW 12.7 04/03/2023     10/26/2024      Lab Results   Component Value Date    GLU 97 10/26/2024    BUN 14 10/26/2024    BUNCREA 16.0 07/28/2021    CREATSERUM 1.09 10/26/2024    ANIONGAP 5 04/03/2023    GFR 81 06/30/2017    GFRNAA 82 10/26/2024    GFRAA 102 07/28/2021    CA 9.0 10/26/2024    OSMOCALC 293 04/03/2023    ALKPHO 105 04/03/2023    AST 28 10/26/2024    ALT 40 10/26/2024    BILT 0.4 10/26/2024    TP 6.7 10/26/2024    ALB 3.6 04/03/2023    GLOBULIN 3.4 04/03/2023    AGRATIO 2.0 08/30/2013     04/03/2023    K 4.1 10/26/2024     10/26/2024    CO2 20 10/26/2024      Lab Results   Component Value Date    CHOLEST 172 10/26/2024    TRIG 233 10/26/2024    HDL 37 10/26/2024     LDL 80 07/28/2021    VLDL 31 (H) 07/28/2021    TCHDLRATIO 4.60 10/26/2024    NONHDLC 113 07/28/2021      Lab Results   Component Value Date    TSH 2.400 10/26/2024      Lab Results   Component Value Date     07/28/2021    A1C 5.7 10/26/2024        ASSESSMENT AND PLAN:   1. Strep pharyngitis  2. Lung crackles  - has ongoing sore throat and chest congestion. Rapid strep was negative, throat culture is positive for strep today. Has lower 02 sat without sob. Crackles at left lung base.  - get CXR to r/o PNA  - start augmentin, if PNA add zpack  - continue supportive care  - to ER for emergent symptoms as needed   - XR CHEST PA + LAT CHEST (CPT=71046); Future  - amoxicillin clavulanate 875-125 MG Oral Tab; Take 1 tablet by mouth 2 (two) times daily for 10 days.  Dispense: 20 tablet; Refill: 0  - albuterol (PROAIR HFA) 108 (90 Base) MCG/ACT Inhalation Aero Soln; Inhale 2 puffs into the lungs every 4 (four) hours as needed for Wheezing or Shortness of Breath.  Dispense: 1 each; Refill: 0  - Spacer/Aero-Holding Chambers (AEROCHAMBER MINI CHAMBER) Does not apply Device; #1 aerochamber to be used with the inhaler as needed  Dispense: 1 each; Refill: 0    The patient indicates understanding of these issues and agrees to the plan.  The patient is asked to return as needed or when routine care is due.

## 2025-02-27 NOTE — PATIENT INSTRUCTIONS
Get chest xray    Take antibiotic completely as ordered     Take antibiotic with food    Eat yogurt twice a day while on antibiotic or take an oral probiotic    Monitor for diarrhea, side effects, allergic reaction    If you have a mild allergic reaction take benadryl and call the office. If it is severe and you have lip or throat swelling or trouble breathing go to the ER or call 911    Continue mucinex or robutussin      Do deep breathing exercises     Use the albuterol as needed     Go to the ER for any emergent symptoms if o2 sat is < 90% or you feel short of breath. If you cannot get there safely call 911.     You may start emergen-c as needed, a multivitamin daily, vitamin C if not in the multivitamin, vitamin d3 1000 units daily.    Get enough exercise, sleep and reduce stress as able    Hydrate well    Use Listerine to rinse three times a day pre treatment for getting sick before events and travel or if you feel like you coulg be getting sick    Follow up as needed or when routine care is due

## 2025-03-03 ENCOUNTER — OFFICE VISIT (OUTPATIENT)
Dept: PHYSICAL THERAPY | Age: 50
End: 2025-03-03
Attending: PHYSICIAN ASSISTANT
Payer: COMMERCIAL

## 2025-03-03 PROCEDURE — 97140 MANUAL THERAPY 1/> REGIONS: CPT

## 2025-03-03 PROCEDURE — 97110 THERAPEUTIC EXERCISES: CPT

## 2025-03-03 NOTE — PROGRESS NOTES
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       Pain: 0/10     Objective  Squat: 3/10 pain at 60 deg (1/10 pain following TFJ accessory mobilization)         Assessment  Initially increased knee fatigue/soreness following leg extension machine. Reduced knee pain following TFJ accessory mobilization at end of session.    Goals (to be met in 8 visits)   Goals                Today    Therapy Goals   On track    Perform a squat >60 deg without knee pain (5 visits)  Perform a 8\" step up without sensation of knee buckling (8 visits)  Perform a full flight of stairs without sensation of knee buckling (8 visits)              Plan  Continue PT. Assess symptom reponse following session, progress leg extension machine if appropriate response.    Treatment Last 4 Visits       2/19/2025 3/3/2025   PT Treatment   Treatment Day  2   Therapeutic Exercise  -Leg extension machine: bilateral 10# x15 reps, R/L 25# x10 reps, R/L 30# x10 reps, L 15# 3x45 sec hold  -Squat x10 reps   Manual Therapy -PFJ inferior glide gr III x8 min -TFJ AP gr II (60 deg knee flexion) x10 min   Therapeutic Exercise Min  30   Manual Therapy Min 10 10   Total of Timed Procedures 10 40   Total of Service Based 0 0   Total Treatment Time 10 40         HEP  -Leg extension machine, 10-15#, 3 sets, 10 reps, 2 days/ week    Charges     TherEx x2, Manual x1

## 2025-03-05 ENCOUNTER — OFFICE VISIT (OUTPATIENT)
Dept: PHYSICAL THERAPY | Age: 50
End: 2025-03-05
Attending: PHYSICIAN ASSISTANT
Payer: COMMERCIAL

## 2025-03-05 PROCEDURE — 97110 THERAPEUTIC EXERCISES: CPT

## 2025-03-05 PROCEDURE — 97140 MANUAL THERAPY 1/> REGIONS: CPT

## 2025-03-05 NOTE — PROGRESS NOTES
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       Pain: 0/10     Objective  Step Down: L reproduced knee pain, felt unstable (slightly worse pain, but improved quality with repetition following resisted leg extension)          Assessment  Mild increase in pain following leg extension machine. No change with manual intervention.    Goals (to be met in 8 visits)   Perform a squat >60 deg without knee pain (5 visits)  Perform a 8\" step up without sensation of knee buckling (8 visits)  Perform a full flight of stairs without sensation of knee buckling (8 visits)       Plan  Continue PT. Perform leg extension machine for progressive resistance to quad.    Treatment Last 4 Visits       2/19/2025 3/3/2025 3/5/2025   PT Treatment   Treatment Day  2 3   Therapeutic Exercise  -Leg extension machine: bilateral 10# x15 reps, R/L 25# x10 reps, R/L 30# x10 reps, L 15# 3x45 sec hold  -Squat x10 reps    Manual Therapy -PFJ inferior glide gr III x8 min -TFJ AP gr II (60 deg knee flexion) x10 min    Therapeutic Exercise Min  30    Manual Therapy Min 10 10    Total of Timed Procedures 10 40    Total of Service Based 0 0    Total Treatment Time 10 40           3/3/2025 3/5/2025   LE Treatment   Treatment Day 2 3   Therapeutic Exercise  -Leg extension machine 10# x10 reps; 2 min iso at 90, 70, 50, 45 deg knee flexion, x2 reps  -Step up/down, 6\" x10 reps   Manual Therapy  L TFJ AP glide gr III x10 min   Therapeutic Exercise  Minutes  30   Manual Therapy Minutes  10   Total Time Of Timed Procedures  40   Total Time Of Service-Based Procedures  0   Total Treatment Time  40        HEP  Leg extension machine, 2min isometric x2 reps    Charges     TherEx x2, Manual x1

## 2025-03-10 ENCOUNTER — OFFICE VISIT (OUTPATIENT)
Dept: PHYSICAL THERAPY | Age: 50
End: 2025-03-10
Attending: PHYSICIAN ASSISTANT
Payer: COMMERCIAL

## 2025-03-10 PROCEDURE — 97110 THERAPEUTIC EXERCISES: CPT

## 2025-03-10 NOTE — PROGRESS NOTES
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 Was in squat position for prolonged period over the weekend, was catching for son.       Pain: 0/10     Objective  Stairs: sensation of knee giving out with descending (improved when cued to facilitate quadriceps)          Assessment  Improved quality of step up/down. Reduced sensation of knee giving out when cued to facilitate quads.    Goals (to be met in 8 visits)   Perform a squat >60 deg without knee pain (5 visits)  Perform a 8\" step up without sensation of knee buckling (8 visits)  Perform a full flight of stairs without sensation of knee buckling (8 visits)           Plan  Continue PT    Treatment Last 4 Visits       2/19/2025 3/3/2025 3/5/2025   PT Treatment   Treatment Day  2 3   Therapeutic Exercise  -Leg extension machine: bilateral 10# x15 reps, R/L 25# x10 reps, R/L 30# x10 reps, L 15# 3x45 sec hold  -Squat x10 reps    Manual Therapy -PFJ inferior glide gr III x8 min -TFJ AP gr II (60 deg knee flexion) x10 min    Therapeutic Exercise Min  30    Manual Therapy Min 10 10    Total of Timed Procedures 10 40    Total of Service Based 0 0    Total Treatment Time 10 40           3/3/2025 3/5/2025 3/10/2025   LE Treatment   Treatment Day 2 3    Therapeutic Exercise  -Leg extension machine 10# x10 reps; 2 min iso at 90, 70, 50, 45 deg knee flexion, x2 reps  -Step up/down, 6\" x10 reps -Step up/down 6\" x15 reps  -Stairs x2 flights  -TKE, grey band, 15x10 sec hold  -Mini squat, kwon band posterior to left knee,  2x10 reps  -Mini squat, grey band lateral to left knee, 2x10 reps   Manual Therapy  L TFJ AP glide gr III x10 min    Therapeutic Exercise Minutes  30 40   Manual Therapy Minutes  10    Total Time Of Timed Procedures  40 40   Total Time Of Service-Based Procedures  0 0   Total Treatment Time  40 40   HEP   -TKE with band or small exercise ball to facilitate quads.  -Leg extension machine 3x/week        HEP  -TKE with band or small exercise ball to facilitate quads.  -Leg extension machine 3x/week    Charges     TherEx x3

## 2025-03-21 ENCOUNTER — HOSPITAL ENCOUNTER (OUTPATIENT)
Age: 50
Discharge: HOME | End: 2025-03-21
Payer: COMMERCIAL

## 2025-03-21 VITALS
TEMPERATURE: 98 F | DIASTOLIC BLOOD PRESSURE: 89 MMHG | RESPIRATION RATE: 18 BRPM | SYSTOLIC BLOOD PRESSURE: 125 MMHG | HEART RATE: 79 BPM | OXYGEN SATURATION: 98 %

## 2025-03-21 DIAGNOSIS — H10.9 BACTERIAL CONJUNCTIVITIS: Primary | ICD-10-CM

## 2025-03-21 PROCEDURE — 99213 OFFICE O/P EST LOW 20 MIN: CPT | Performed by: PHYSICIAN ASSISTANT

## 2025-03-21 RX ORDER — OFLOXACIN 3 MG/ML
2 SOLUTION/ DROPS OPHTHALMIC 4 TIMES DAILY
Qty: 10 ML | Refills: 0 | Status: SHIPPED | OUTPATIENT
Start: 2025-03-21 | End: 2025-03-26

## 2025-03-21 NOTE — ED INITIAL ASSESSMENT (HPI)
Pt with c/o left eye drainage and itching that started on Monday.  Pt states started after staying at a hotel this weekend

## 2025-03-21 NOTE — ED PROVIDER NOTES
Patient Seen in: [facility]      History     Chief Complaint   Patient presents with    Eye Visual Problem     Stated Complaint: Eye Problem - Suspected pink eye    Subjective:   HPI      Patient is a 49-year-old male that presents to immediate care due to left eye redness x 5 days.  Patient notes increased purulent drainage, irritation over the past week.  Denies eye injury eye pain pressure or blurred vision.  Does wear contact lenses.    Objective:     Past Medical History:    Allergic rhinitis    Arthritis    Carpal tunnel syndrome    CPAP (continuous positive airway pressure) dependence    ETD (eustachian tube dysfunction)    Migraine    Neuropathy    numbness and tingling right hand, ulnar issues    MATT (obstructive sleep apnea)    cpap 10?    MATT (obstructive sleep apnea)    Otalgia of left ear    Persistent disorder of initiating or maintaining wakefulness    Ulnar nerve abnormality    Unspecified sleep apnea    URI (upper respiratory infection)    Visual impairment    wears glasses for distance              Past Surgical History:   Procedure Laterality Date    Adenoidectomy      Cholecystectomy  2015    Colonoscopy      Colonoscopy      Femur/knee surg unlisted      right knee    Hand/finger surgery unlisted      Laparoscopic cholecystectomy N/A 03/04/2015    Procedure: LAPAROSCOPIC CHOLECYSTECTOMY;  Surgeon: Torres Alegre MD;  Location:  MAIN OR    Other surgical history  3/9/23    Trigger finger, right hand middle finger    Sinus surgery                    Social History     Socioeconomic History    Marital status:    Occupational History    Occupation: sales management   Tobacco Use    Smoking status: Never     Passive exposure: Never    Smokeless tobacco: Never   Vaping Use    Vaping status: Never Used   Substance and Sexual Activity    Alcohol use: Yes     Comment: Maybe 1/month    Drug use: No   Other Topics Concern    Caffeine Concern Yes     Comment: Wonder if it  may be tied to palpitations    Stress Concern No    Weight Concern No    Special Diet No    Exercise Yes    Seat Belt Yes              Review of Systems    Positive for stated complaint: Eye Problem - Suspected pink eye  Other systems are as noted in HPI.  Constitutional and vital signs reviewed.      All other systems reviewed and negative except as noted above.    Physical Exam     ED Triage Vitals [03/21/25 1138]   /89   Pulse 79   Resp 18   Temp 98 °F (36.7 °C)   Temp src Oral   SpO2 98 %   O2 Device None (Room air)       Current Vitals:   Vital Signs  BP: 125/89  Pulse: 79  Resp: 18  Temp: 98 °F (36.7 °C)  Temp src: Oral    Oxygen Therapy  SpO2: 98 %  O2 Device: None (Room air)      Right Eye Chart Acuity: 20/25, Corrected  Left Eye Chart Acuity: 20/15, Corrected  Physical Exam  Vital signs reviewed. Nursing note reviewed.  Constitutional: Well-developed. Well-nourished. In no acute distress  HENT: Mucous membranes moist.   EYES: left conjunctival injection. PERRL EOMI  NECK: Full ROM. Supple.   CARDIAC: Normal rate.   PULM/CHEST:  No wheezes  Extremities: Full ROM  NEURO: Awake, alert, following commands, moving extremities, answering questions.   SKIN: Warm and dry. No rash or lesions.  PSYCH: Normal judgment. Normal affect.           MDM      Patient is a 49-year-old male that presents to immediate care due to left eye redness x 5 days.  Patient arrives with stable vitals sitting comfortably in no acute distress.  Physical exam showing left conjunctival injection with scant purulent drainage.  Most likely bacterial conjunctivitis.  Less likely allergic, viral conjunctivitis.  Less likely corneal abrasion or acute angle glaucoma with no eye pain pressure or blurred vision.  Will prescribe antibiotic eyedrops, ofloxacin.  Return protocols discussed including worsening symptoms including eye pain, blurred vision.  History given by patient.  Patient agreeable to plan all questions  answered.          Medical Decision Making      Disposition and Plan     Clinical Impression:  1. Bacterial conjunctivitis         Disposition:  Discharge  3/21/2025 11:52 am    Follow-up:  Marilyn Mahoney MD  [address] [address] [phone]    Call             Medications Prescribed:  Current Discharge Medication List        START taking these medications    Details   ofloxacin 0.3 % Ophthalmic Solution Apply 2 drops to eye 4 (four) times daily for 5 days.  Qty: 10 mL, Refills: 0                 Supplementary Documentation:

## 2025-03-26 ENCOUNTER — OFFICE VISIT (OUTPATIENT)
Dept: PHYSICAL THERAPY | Age: 50
End: 2025-03-26
Attending: PHYSICIAN ASSISTANT
Payer: COMMERCIAL

## 2025-03-26 PROCEDURE — 97110 THERAPEUTIC EXERCISES: CPT

## 2025-03-26 PROCEDURE — 97140 MANUAL THERAPY 1/> REGIONS: CPT

## 2025-03-26 NOTE — PATIENT INSTRUCTIONS
Home Exercise Routine:  Quad set (engage quad), then lift leg 3-4 inches from table. Hold 15-20 seconds. Rest 5-10 seconds. Repeat for 2-3 minutes or until quad fatigue.  Leg extension machine with isometric hold  Standing leg extension at cable machine, 15-20 sec hold, Rest 5-10 seconds. Repeat for 2-3 minutes or until quad fatigue    *Start with exercise 1, perform exercise 2 or 3 in any order.

## 2025-03-26 NOTE — PROGRESS NOTES
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 Performing the isometrics feels like a good challenge.       Pain: 2/10     Objective  Step Down (6\"): R WNL; L reduced control with lowering, pain          Assessment  Initially painful with quad set SLR, improved following PFJ mobilization. Progressed HEP to increase challenge to quadriceps.    Goals (to be met in 8 visits)   Perform a squat >60 deg without knee pain (5 visits)  Perform a 8\" step up without sensation of knee buckling (8 visits)  Perform a full flight of stairs without sensation of knee buckling (8 visits)               Plan  Continue PT    Treatment Last 4 Visits  Treatment Day: 4       3/5/2025 3/10/2025 3/26/2025   LE Treatment   Therapeutic Exercise -Leg extension machine 10# x10 reps; 2 min iso at 90, 70, 50, 45 deg knee flexion, x2 reps  -Step up/down, 6\" x10 reps -Step up/down 6\" x15 reps  -Stairs x2 flights  -TKE, grey band, 15x10 sec hold  -Mini squat, kwon band posterior to left knee, 2x10 reps  -Mini squat, grey band lateral to left knee, 2x10 reps -Step up/down 6\" x15 reps  -Stairs x2 flights  -TKE, grey band, 15x10 sec hold  -Mini squat, kwon band posterior to left knee, 2x10 reps  -Mini squat, grey band lateral to left knee, 2x10 reps  -Quad set with SLR 15 sec, 3 min total   Manual Therapy L TFJ AP glide gr III x10 min  -PFJ sustained medial glide during SLR x8 min   Therapeutic Exercise Minutes 30 40 30   Manual Therapy Minutes 10  10   Total Time Of Timed Procedures 40 40 40   Total Time Of Service-Based Procedures 0 0 0    Total Treatment Time 40 40 40   HEP  -TKE with band or small exercise ball to facilitate quads.  -Leg extension machine 3x/week -Quad set with SLR  -Isometric with leg extension machine  -Standing TKE with cable machine        HEP  -Quad set with SLR  -Isometric with leg extension machine  -Standing TKE with cable machine    Charges     TherEx x2, Manual x1

## 2025-04-02 ENCOUNTER — OFFICE VISIT (OUTPATIENT)
Dept: PHYSICAL THERAPY | Age: 50
End: 2025-04-02
Attending: PHYSICIAN ASSISTANT
Payer: COMMERCIAL

## 2025-04-02 PROCEDURE — 97140 MANUAL THERAPY 1/> REGIONS: CPT

## 2025-04-02 PROCEDURE — 97110 THERAPEUTIC EXERCISES: CPT

## 2025-04-02 NOTE — PROGRESS NOTES
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 Hasn't noticed any increase in pain, but reports quad fatigue with exercises. Ascending stairs seems imporved at times.       Pain: 0/10     Objective  Retro Step Up (6\"): R LE: WNL; L LE: 6/10 pain, Simran UE x2 (nearly pain free, no UE assist following TFJ medial rotation mobilization)       Assessment  Alleviated knee pain with retro step up following TFJ medial rotation mobilization. Instructed on seated TFJ medial rotation AROM to compliment intervention.    Goals (to be met in 8 visits)   Perform a squat >60 deg without knee pain (5 visits) Progressing  Perform a 8\" step up without sensation of knee buckling (8 visits) Progressing  Perform a full flight of stairs without sensation of knee buckling (8 visits) Progressing     Plan  Continue PT    Treatment Last 4 Visits  Treatment Day: 5       3/5/2025 3/10/2025 3/26/2025 4/2/2025   LE Treatment   Therapeutic Exercise -Leg extension machine 10# x10 reps; 2 min iso at 90, 70, 50, 45 deg knee flexion, x2 reps  -Step up/down, 6\" x10 reps -Step up/down 6\" x15 reps  -Stairs x2 flights  -TKE, grey band, 15x10 sec hold  -Mini squat, kwon band posterior to left knee, 2x10 reps  -Mini squat, grey band lateral to left knee, 2x10 reps -Step up/down 6\" x15 reps  -Stairs x2 flights  -TKE, grey band, 15x10 sec hold  -Mini squat, kwon band posterior to left knee, 2x10 reps  -Mini squat, grey band lateral to left knee, 2x10 reps  -Quad set with SLR 15 sec, 3 min total -TFJ medial rotation AROM  x20 reps  -Step up 6\" R/L x20 reps  -Retro step up 6\" R/L x20 reps  -Quad set 10x20 sec hold   Manual Therapy L TFJ AP glide gr III x10 min  -PFJ sustained medial glide during SLR x8 min -L TFJ medial rotation physiological mobilization gr IV x10 min   Therapeutic Exercise Minutes 30 40 30 30   Manual Therapy Minutes 10  10 10   Total Time Of Timed Procedures 40 40 40 40   Total Time Of Service-Based Procedures 0 0 0 0   Total Treatment Time 40 40 40 40   HEP  -TKE with band or small exercise ball to facilitate quads.  -Leg extension machine 3x/week -Quad set with SLR  -Isometric with leg extension machine  -Standing TKE with cable machine -Seated TFJ medial rotation AROM x20 reps, 2-3x/day        HEP  -Seated TFJ medial rotation AROM x20 reps, 2-3x/day    Charges     TherEx x2, Manual x1

## 2025-04-09 ENCOUNTER — OFFICE VISIT (OUTPATIENT)
Dept: PHYSICAL THERAPY | Age: 50
End: 2025-04-09
Attending: PHYSICIAN ASSISTANT
Payer: COMMERCIAL

## 2025-04-09 PROCEDURE — 97110 THERAPEUTIC EXERCISES: CPT

## 2025-04-09 PROCEDURE — 97140 MANUAL THERAPY 1/> REGIONS: CPT

## 2025-04-09 NOTE — PATIENT INSTRUCTIONS
Home Exercise:  -Add leg press (small angle of knee bend to start), combine with other leg machine/exercises.

## 2025-04-09 NOTE — PROGRESS NOTES
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 Was able to do a retro step up with some improvement.       Pain: 0/10     Objective  Retro Step Up (6\"): R LE: WNL; L LE: 3/10 pain, (nearly pain free, no UE assist following TFJ medial rotation mobilization)          Assessment  Reduced pain with retro step up at start of session. Still presents with pain with retro step up, but improved quality, able to perform without UE assist.    Goals (to be met in 8 visits)   Perform a squat >60 deg without knee pain (5 visits) Progressing  Perform a 8\" step up without sensation of knee buckling (8 visits) Progressing  Perform a full flight of stairs without sensation of knee buckling (8 visits) Progressing       Plan  Continue PT    Treatment Last 4 Visits  Treatment Day: 6       3/10/2025 3/26/2025 4/2/2025 4/9/2025   LE Treatment   Therapeutic Exercise -Step up/down 6\" x15 reps  -Stairs x2 flights  -TKE, grey band, 15x10 sec hold  -Mini squat, kwon band posterior to left knee, 2x10 reps  -Mini squat, grey band lateral to left knee, 2x10 reps -Step up/down 6\" x15 reps  -Stairs x2 flights  -TKE, grey band, 15x10 sec hold  -Mini squat, kwon band posterior to left knee, 2x10 reps  -Mini squat, grey band lateral to left knee, 2x10 reps  -Quad set with SLR 15 sec, 3 min total -TFJ medial rotation AROM x20 reps  -Step up 6\" R/L x20 reps  -Retro step up 6\" R/L x20 reps  -Quad set 10x20 sec hold -TFJ medial rotation AROM x20 reps   -Step up 6\" R/L x20 reps   -Retro step up 6\" R/L x20 reps   -Quad set 10x20 sec hold   -Leg press,  shuttle, L3 R/L x10 reps   Manual Therapy  -PFJ sustained medial glide during SLR x8 min -L TFJ medial rotation physiological mobilization gr IV x10 min -L TFJ medial rotation physiological mobilization gr IV x10 min   Therapeutic Exercise Minutes 40 30 30 30   Manual Therapy Minutes  10 10 10   Total Time Of Timed Procedures 40 40 40 40   Total Time Of Service-Based Procedures 0 0 0 0   Total Treatment Time 40 40 40 40   HEP -TKE with band or small exercise ball to facilitate quads.  -Leg extension machine 3x/week -Quad set with SLR  -Isometric with leg extension machine  -Standing TKE with cable machine -Seated TFJ medial rotation AROM x20 reps, 2-3x/day Add leg press machine to fitness center routine        HEP  Add leg press machine to fitness center routine    Charges     TherEx x2, Manual x1

## 2025-04-23 ENCOUNTER — OFFICE VISIT (OUTPATIENT)
Dept: PHYSICAL THERAPY | Age: 50
End: 2025-04-23
Attending: PHYSICIAN ASSISTANT
Payer: COMMERCIAL

## 2025-04-23 PROCEDURE — 97110 THERAPEUTIC EXERCISES: CPT

## 2025-04-23 PROCEDURE — 97140 MANUAL THERAPY 1/> REGIONS: CPT

## 2025-04-23 NOTE — PROGRESS NOTES
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 Stills feels like it's progressing in the correct direction, but slowly.       Pain: 1/10     Objective  Retro Step Up (6\"): R LE: WNL; L LE: 3/10 pain, (pain free, no UE assist following TFJ medial rotation mobilization)          Assessment  Pain free step up following TFJ medial rotation mobilization. Discussed performing self mobilization daily to assess symptom reponse.    Goals (to be met in 8 visits)   Perform a squat >60 deg without knee pain (5 visits) Progressing  Perform a 8\" step up without sensation of knee buckling (8 visits) Progressing  Perform a full flight of stairs without sensation of knee buckling (8 visits) Progressing         Plan  Continue PT    Treatment Last 4 Visits  Treatment Day: 7       3/26/2025 4/2/2025 4/9/2025 4/23/2025   LE Treatment   Therapeutic Exercise -Step up/down 6\" x15 reps  -Stairs x2 flights  -TKE, grey band, 15x10 sec hold  -Mini squat, kwon band posterior to left knee, 2x10 reps  -Mini squat, grey band lateral to left knee, 2x10 reps  -Quad set with SLR 15 sec, 3 min total -TFJ medial rotation AROM x20 reps  -Step up 6\" R/L x20 reps  -Retro step up 6\" R/L x20 reps  -Quad set 10x20 sec hold -TFJ medial rotation AROM x20 reps   -Step up 6\" R/L x20 reps   -Retro step up 6\" R/L x20 reps   -Quad set 10x20 sec hold   -Leg press, shuttle, L3 R/L x10 reps -TFJ medial rotation AROM x20 reps   -Step up 6\" R/L x20 reps   -Retro step up 6\" R/L x20 reps   -Quad set 10x20 sec hold    -Leg extension, R/L 15#, 2x10 reps   Manual Therapy -PFJ sustained medial glide during SLR x8 min -L TFJ medial rotation physiological mobilization gr IV x10 min -L TFJ medial rotation physiological mobilization gr IV x10 min -L TFJ medial rotation physiological mobilization gr IV x10 min   Therapeutic Exercise Minutes 30 30 30 30   Manual Therapy Minutes 10 10 10 10   Total Time Of Timed Procedures 40 40 40 40   Total Time Of Service-Based Procedures 0 0 0 0   Total Treatment Time 40 40 40 40   HEP -Quad set with SLR  -Isometric with leg extension machine  -Standing TKE with cable machine -Seated TFJ medial rotation AROM x20 reps, 2-3x/day Add leg press machine to fitness center routine         HEP  Add leg press machine to fitness center routine    Charges     TherEX x2, Manual x1

## 2025-05-07 ENCOUNTER — OFFICE VISIT (OUTPATIENT)
Dept: PHYSICAL THERAPY | Age: 50
End: 2025-05-07
Attending: PHYSICIAN ASSISTANT
Payer: COMMERCIAL

## 2025-05-07 PROCEDURE — 97110 THERAPEUTIC EXERCISES: CPT

## 2025-05-07 PROCEDURE — 97140 MANUAL THERAPY 1/> REGIONS: CPT

## 2025-05-07 NOTE — PROGRESS NOTES
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     Subjective  Knee has been doing well since last session, able to complete resistive training with a normal exercise fatigue response. No sensation of giving out with stairs since last session. Developed right foot pain a few days ago, pain located along lateral foot radiates into plantar aspect of foot. Worse with walking and R SLS. Denies trauma/spraining ankle.       Pain: 2/10 (right lateral foot pain (though, 0/10 Knee pain))     Objective    Palpation: no longer reports pain with palpation to medial/lateral L knee        2/19/2025 5/7/2025   Knee ROM/MMT   Rt Knee Flexion 135 135   Lt Knee Flexion 135 135   Rt Knee Flexion MMT 5 5   Lt Knee Flexion MMT 5 5   Rt Knee Extension (L3) 0 0   Lt Knee Extension (L3) 0 0   Rt Knee Extension MMT (L3) 5 5   Lt Knee Extension MMT (L3) 5 5   , Ankle/Foot       5/7/2025   Ankle/Foot ROM/MMT   Rt Foot/Ank Pf MMT (S1) 5   Lt Foot/Ank Pf MMT (S1) 5   Rt Foot/Ank Df MMT (L4) 5   Lt Foot/Ank Df MMT (L4) 5   Rt Foot/Ank Inversion MMT 5   Lt Foot/Ank Inversion MMT 5   Rt Foot/Ank Eversion MMT 5   Lt Foot/Ank Eversion MMT 5     Accessory Mobilization:   Knee: R WNL TFJ, L WNL TFJ  Foot: R hypomobile cuboid-5th metatarsal (reproduces pain)     Assessment  Continued progress with stairs, no longer reports pain or sensation of buckling when navigating stairs. Right foot pain reproduced with cuboid palpation and PA cuboid accessory motion testing. Mild relief in symptoms with initial cuboid  mobilization, though symptoms provoked following WB heel raise with resisted eversion. Instructed on seated heel raise with resisted eversion. He would benefit from continued PT intervention to further progress resistance training for knee to prepare for higher level training.    Goals (to be met in 8 visits)   Perform a squat >60 deg without knee pain (5 visits) Met  Perform a 8\" step up without sensation of knee buckling (8 visits) Met  Perform a full flight of stairs without sensation of knee buckling (8 visits) Nearly met    Plan: Continue skilled Physical Therapy for 6 additional visits over a 90 day period. Treatment will include: TherEx, TherAc, NMR, Manual, Modalities prn.       Patient was advised of these findings, precautions, and treatment options and has agreed to actively participate in planning and for this course of care.    Thank you for your referral. If you have any questions, please contact me at Dept: [phone].    Sincerely,  Electronically signed by therapist: Sherif Mendoza, PT, DPT   Certification From: 5/7/2025  To:8/5/2025       Treatment Last 4 Visits  Treatment Day: 8       4/2/2025 4/9/2025 4/23/2025 5/7/2025   LE Treatment   Therapeutic Exercise -TFJ medial rotation AROM x20 reps  -Step up 6\" R/L x20 reps  -Retro step up 6\" R/L x20 reps  -Quad set 10x20 sec hold -TFJ medial rotation AROM x20 reps   -Step up 6\" R/L x20 reps   -Retro step up 6\" R/L x20 reps   -Quad set 10x20 sec hold   -Leg press, shuttle, L3 R/L x10 reps -TFJ medial rotation AROM x20 reps   -Step up 6\" R/L x20 reps   -Retro step up 6\" R/L x20 reps   -Quad set 10x20 sec hold   -Leg extension, R/L 15#, 2x10 reps -Step up 6\" R/L x20 reps   -Retro step up 6\" R/L x20 reps   -Quad set 10x20 sec hold   -Standing heel raise, green band around heels, x15 reps  -Seated heel raise, green band around heels, 2x10 reps  -Seated resisted ankle eversion, green band, x10 reps     Manual Therapy -L TFJ medial rotation physiological  mobilization gr IV x10 min -L TFJ medial rotation physiological mobilization gr IV x10 min -L TFJ medial rotation physiological mobilization gr IV x10 min -Cuboid PA accessory mobilization gr II x10 min   Therapeutic Exercise Minutes 30 30 30 30   Manual Therapy Minutes 10 10 10 10   Total Time Of Timed Procedures 40 40 40 40   Total Time Of Service-Based Procedures 0 0 0 0   Total Treatment Time 40 40 40 40   HEP -Seated TFJ medial rotation AROM x20 reps, 2-3x/day Add leg press machine to fitness center routine  Seated heel raise with green band around ankles to facilitate evertors, 2x10 reps        HEP  Seated heel raise with green band around ankles to facilitate evertors, 2x10 reps    Charges     TherEx x2, Manual x1

## 2025-05-08 ENCOUNTER — HOSPITAL ENCOUNTER (OUTPATIENT)
Age: 50
Discharge: HOME | End: 2025-05-08
Payer: COMMERCIAL

## 2025-05-08 ENCOUNTER — APPOINTMENT (OUTPATIENT)
Dept: GENERAL RADIOLOGY | Age: 50
End: 2025-05-08
Attending: PHYSICIAN ASSISTANT
Payer: COMMERCIAL

## 2025-05-08 VITALS
OXYGEN SATURATION: 100 % | DIASTOLIC BLOOD PRESSURE: 97 MMHG | RESPIRATION RATE: 16 BRPM | HEIGHT: 68 IN | TEMPERATURE: 98 F | WEIGHT: 198 LBS | HEART RATE: 72 BPM | BODY MASS INDEX: 30.01 KG/M2 | SYSTOLIC BLOOD PRESSURE: 127 MMHG

## 2025-05-08 DIAGNOSIS — M79.671 RIGHT FOOT PAIN: Primary | ICD-10-CM

## 2025-05-08 PROCEDURE — 73630 X-RAY EXAM OF FOOT: CPT | Performed by: PHYSICIAN ASSISTANT

## 2025-05-08 PROCEDURE — 99213 OFFICE O/P EST LOW 20 MIN: CPT | Performed by: PHYSICIAN ASSISTANT

## 2025-05-08 NOTE — ED PROVIDER NOTES
Patient Seen in: [facility]      History     Chief Complaint   Patient presents with    Leg or Foot Injury     Entered by patient     Stated Complaint: Leg or Foot Injury    Subjective:   HPI    Patient is a 49-year-old male that presents to immediate care due to right foot pain x 4 days.  Patient states that he went for a walk on Sunday and while he was walking he felt a shooting pain in his right foot.  States that the pain has been persistent.  Has been taking Tylenol with minimal relief.  Denies known injury or fall.  Denies having symptoms like this previously.  History of Present Illness               Objective:     Past Medical History:    Allergic rhinitis    Arthritis    Carpal tunnel syndrome    CPAP (continuous positive airway pressure) dependence    ETD (eustachian tube dysfunction)    Migraine    Neuropathy    numbness and tingling right hand, ulnar issues    MATT (obstructive sleep apnea)    cpap 10?    MATT (obstructive sleep apnea)    Otalgia of left ear    Persistent disorder of initiating or maintaining wakefulness    Ulnar nerve abnormality    Unspecified sleep apnea    URI (upper respiratory infection)    Visual impairment    wears glasses for distance              Past Surgical History:   Procedure Laterality Date    Adenoidectomy      Cholecystectomy  2015    Colonoscopy      Colonoscopy      Femur/knee surg unlisted      right knee    Hand/finger surgery unlisted      Laparoscopic cholecystectomy N/A 03/04/2015    Procedure: LAPAROSCOPIC CHOLECYSTECTOMY;  Surgeon: Torres Alegre MD;  Location:  MAIN OR    Other surgical history  3/9/23    Trigger finger, right hand middle finger    Sinus surgery                    Social History     Socioeconomic History    Marital status:    Occupational History    Occupation: [employer]   Tobacco Use    Smoking status: Never     Passive exposure: Never    Smokeless tobacco: Never   Vaping Use    Vaping status: Never  Used   Substance and Sexual Activity    Alcohol use: Yes     Comment: Maybe 1/month    Drug use: No   Other Topics Concern    Caffeine Concern Yes     Comment: Wonder if it may be tied to palpitations    Stress Concern No    Weight Concern No    Special Diet No    Exercise Yes    Seat Belt Yes              Review of Systems    Positive for stated complaint: Leg or Foot Injury  Other systems are as noted in HPI.  Constitutional and vital signs reviewed.      All other systems reviewed and negative except as noted above.                  Physical Exam     ED Triage Vitals [05/08/25 1717]   BP (!) 127/97   Pulse 72   Resp 16   Temp 98.3 °F (36.8 °C)   Temp src Oral   SpO2 100 %   O2 Device None (Room air)       Current Vitals:   Vital Signs  BP: (!) 127/97  Pulse: 72  Resp: 16  Temp: 98.3 °F (36.8 °C)  Temp src: Oral    Oxygen Therapy  SpO2: 100 %  O2 Device: None (Room air)        Physical Exam  Vital signs reviewed. Nursing note reviewed.  Constitutional: Well-developed. Well-nourished. In no acute distress  HENT: Mucous membranes moist.   EYES: No scleral icterus or conjunctival injection.  NECK: Full ROM. Supple.   CARDIAC: Normal rate.   PULM/CHEST: . No wheezes  Extremities: Full ROM of right foot and ankle.  No overlying edema ecchymosis erythema or warmth.  Point tenderness palpation along the fifth metatarsal of the right foot.  NEURO: Awake, alert, following commands, moving extremities, answering questions.   SKIN: Warm and dry. No rash or lesions.  PSYCH: Normal judgment. Normal affect.      Physical Exam                        MDM      Patient is a 49-year-old male who presents to immediate care due to right foot pain x 4 days.  Patient arrives with stable vitals.  Physical exam showing tenderness palpation along the fifth metatarsal of the right foot with no overlying edema erythema or warmth.  X-ray images performed personally reviewed by me showing no acute fracture or dislocation of the foot.  Most  likely plantar fasciitis versus foot sprain.  Discussed RICE.  Take Tylenol and ibuprofen as needed for pain.  Podiatry referral given at time of discharge if symptoms persist.  History given by patient.        Medical Decision Making      Disposition and Plan     Clinical Impression:  1. Right foot pain         Disposition:  Discharge  5/8/2025  5:54 pm    Follow-up:  Mulugeta Johnson DPM  [address] [address]  [phone]      If symptoms worsen          Medications Prescribed:  Discharge Medication List as of 5/8/2025  5:54 PM          Supplementary Documentation:

## 2025-05-28 ENCOUNTER — OFFICE VISIT (OUTPATIENT)
Dept: PHYSICAL THERAPY | Age: 50
End: 2025-05-28
Attending: PHYSICIAN ASSISTANT
Payer: COMMERCIAL

## 2025-05-28 PROCEDURE — 97110 THERAPEUTIC EXERCISES: CPT

## 2025-05-28 NOTE — PATIENT INSTRUCTIONS
For foot pain:  -Isometric exercise pushing outside of foot against wall/sturdy surface. Limit effort to 10% (pain free), hold 30 seconds, rest 60 seconds, repeat 3-5 times, 2-3x/day. Progress effort if pain not provoked over the next couple of days.    Knee exercise progressions (if foot is not irritated)  -Split squat with front foot on unstable surface, 10 reps, 5 sec hold, each leg, 2 sets  -Side stepping right and left with band around thighs/shin. Perform both in a partial squat and full upright position  -Step up combined with the cable machine (behind knee)   Moderna dose 1 and 2

## 2025-05-28 NOTE — PROGRESS NOTES
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 Had to limit knee exercises while foot pain was increased. Was able to do a ladder mutliple times, felt easier compared to when he did it in the fall. No knee buckling episodes since last session.       Pain: 0/10     Objective  Retro Step Up (6\"): R LE: WNL; L LE: 1/10 pain, (improved control following knee TKE with step up)            Assessment  Progressed LE exercises to training on unstable surface. Right foot pain with lateral stepping, incorporated light isoetric exercises. Discussed approrpatie exericse progression based on foot symptoms.    Goals (to be met in 14 visits)   Perform a squat >60 deg without knee pain (5 visits) Met  Perform a 8\" step up without sensation of knee buckling (8 visits) Met  Perform a full flight of stairs without sensation of knee buckling (8 visits) Nearly met    Plan: Continue skilled Physical Therapy for 6 additional visits over a 90 day period. Treatment will include: TherEx, TherAc, NMR, Manual, Modalities prn.       Patient was advised of these findings, precautions, and treatment options and has agreed to actively participate in planning and for this course of care.    Thank you for your referral. If you have any questions, please contact me at Dept: [phone].    Sincerely,  Electronically signed by therapist: Sherif Mendoza, PT, DPT   Certification From: 5/7/2025  To:8/5/2025     Plan  Continue PT    Treatment Last 4 Visits  Treatment Day: 9       4/9/2025 4/23/2025 5/7/2025 5/28/2025   LE Treatment    Therapeutic Exercise -TFJ medial rotation AROM x20 reps   -Step up 6\" R/L x20 reps   -Retro step up 6\" R/L x20 reps   -Quad set 10x20 sec hold   -Leg press, shuttle, L3 R/L x10 reps -TFJ medial rotation AROM x20 reps   -Step up 6\" R/L x20 reps   -Retro step up 6\" R/L x20 reps   -Quad set 10x20 sec hold   -Leg extension, R/L 15#, 2x10 reps -Step up 6\" R/L x20 reps   -Retro step up 6\" R/L x20 reps   -Quad set 10x20 sec hold   -Standing heel raise, green band around heels, x15 reps  -Seated heel raise, green band around heels, 2x10 reps  -Seated resisted ankle eversion, green band, x10 reps   -Step up 6\" R/L x20 reps   -Retro step up 6\" R/L x20 reps   -Quad set 10x20 sec hold   -Mini lunge onto airex, R/L 2x5x5 sec hold  -Resisted side stepping, green band, R/L   -Ankle eversion isometric, 3x30 sec   Manual Therapy -L TFJ medial rotation physiological mobilization gr IV x10 min -L TFJ medial rotation physiological mobilization gr IV x10 min -Cuboid PA accessory mobilization gr II x10 min    Therapeutic Exercise Minutes 30 30 30 40   Manual Therapy Minutes 10 10 10    Total Time Of Timed Procedures 40 40 40 40   Total Time Of Service-Based Procedures 0 0 0 0   Total Treatment Time 40 40 40 40   HEP Add leg press machine to fitness center routine  Seated heel raise with green band around ankles to facilitate evertors, 2x10 reps         HEP  Seated heel raise with green band around ankles to facilitate evertors, 2x10 reps    Charges     TherEx x3

## 2025-06-11 ENCOUNTER — OFFICE VISIT (OUTPATIENT)
Dept: PHYSICAL THERAPY | Age: 50
End: 2025-06-11
Attending: PHYSICIAN ASSISTANT
Payer: COMMERCIAL

## 2025-06-11 PROCEDURE — 97110 THERAPEUTIC EXERCISES: CPT

## 2025-06-11 NOTE — PROGRESS NOTES
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 Knee has been doing well, no buckling episodes.       Pain: 1/10 (Right lateral foot)     Objective  Retro Step Up (6\"): R LE: WNL; L LE: 0/10 pain            Assessment  Able to tolerate increased loading for fibularis longus. Good form with weightbearing exercises. Discussed gradually progressing HEP, monitoring for foot symptom changes.    Goals (to be met in 14 visits)   Perform a squat >60 deg without knee pain (5 visits) Met  Perform a 8\" step up without sensation of knee buckling (8 visits) Met  Perform a full flight of stairs without sensation of knee buckling (8 visits) Nearly met     Plan  Continue PT    Treatment Last 4 Visits  Treatment Day: 10       4/23/2025 5/7/2025 5/28/2025 6/11/2025   LE Treatment   Therapeutic Exercise -TFJ medial rotation AROM x20 reps   -Step up 6\" R/L x20 reps   -Retro step up 6\" R/L x20 reps   -Quad set 10x20 sec hold   -Leg extension, R/L 15#, 2x10 reps -Step up 6\" R/L x20 reps   -Retro step up 6\" R/L x20 reps   -Quad set 10x20 sec hold   -Standing heel raise, green band around heels, x15 reps  -Seated heel raise, green band around heels, 2x10 reps  -Seated resisted ankle eversion, green band, x10 reps   -Step up 6\" R/L x20 reps   -Retro step up 6\" R/L x20 reps   -Quad set 10x20 sec hold   -Mini lunge onto airex, R/L 2x5x5 sec hold  -Resisted side stepping, green band, R/L   -Ankle eversion  isometric, 3x30 sec -Step up 6\" R/L x20 reps   -Retro step up 6\" R/L x20 reps   -Quad set 10x20 sec hold   -Mini lunge onto airex, R/L 2x5x5 sec hold   -Resisted side stepping, green band, R/L   -Ankle eversion isometric, 3x30 sec      Manual Therapy -L TFJ medial rotation physiological mobilization gr IV x10 min -Cuboid PA accessory mobilization gr II x10 min     Therapeutic Exercise Minutes 30 30 40 30   Manual Therapy Minutes 10 10     Total Time Of Timed Procedures 40 40 40 30   Total Time Of Service-Based Procedures 0 0 0 0   Total Treatment Time 40 40 40 30   HEP  Seated heel raise with green band around ankles to facilitate evertors, 2x10 reps          HEP  Seated heel raise with green band around ankles to facilitate evertors, 2x10 reps    Charges     TherEx x2

## 2025-06-12 ENCOUNTER — OFFICE VISIT (OUTPATIENT)
Dept: INTERNAL MEDICINE CLINIC | Facility: CLINIC | Age: 50
End: 2025-06-12
Payer: COMMERCIAL

## 2025-06-12 VITALS
TEMPERATURE: 97 F | WEIGHT: 199.13 LBS | RESPIRATION RATE: 16 BRPM | HEART RATE: 76 BPM | DIASTOLIC BLOOD PRESSURE: 76 MMHG | BODY MASS INDEX: 30.89 KG/M2 | HEIGHT: 67.36 IN | OXYGEN SATURATION: 97 % | SYSTOLIC BLOOD PRESSURE: 126 MMHG

## 2025-06-12 DIAGNOSIS — E66.9 OBESITY (BMI 30-39.9): ICD-10-CM

## 2025-06-12 DIAGNOSIS — Z13.29 SCREENING FOR THYROID DISORDER: ICD-10-CM

## 2025-06-12 DIAGNOSIS — Z13.220 SCREENING FOR CHOLESTEROL LEVEL: ICD-10-CM

## 2025-06-12 DIAGNOSIS — Z00.00 ENCOUNTER FOR ANNUAL PHYSICAL EXAM: Primary | ICD-10-CM

## 2025-06-12 DIAGNOSIS — Z12.5 SCREENING FOR PROSTATE CANCER: ICD-10-CM

## 2025-06-12 DIAGNOSIS — R79.89 LOW VITAMIN D LEVEL: ICD-10-CM

## 2025-06-12 RX ORDER — ERGOCALCIFEROL 1.25 MG/1
50000 CAPSULE, LIQUID FILLED ORAL WEEKLY
Qty: 12 CAPSULE | Refills: 0 | Status: SHIPPED | OUTPATIENT
Start: 2025-06-12 | End: 2025-09-04

## 2025-06-12 NOTE — PROGRESS NOTES
CHIEF COMPLAINT   Kelton Ba is a 49 year old male who presents for a complete physical exam.     HPI:   Here for physical.    Wt Readings from Last 6 Encounters:   06/12/25 199 lb 1.6 oz (90.3 kg)   05/08/25 198 lb (89.8 kg)   02/27/25 202 lb 9.6 oz (91.9 kg)   02/24/25 203 lb (92.1 kg)   12/02/24 200 lb (90.7 kg)   10/10/24 190 lb (86.2 kg)     Body mass index is 30.85 kg/m².       Diet and exercise are fair. Vaccines reviewed. Wearing seat belt and no texting and driving. Feels safe at home. Colon cancer screening UTD. No smoking. Rare alcohol. No drinking and driving. No skin concerns. Sees derm. Sees a dentist routinely. Labs to be ordered/reviewed.        Cholesterol, Total (mg/dL)   Date Value   10/26/2024 172   10/21/2023 160   10/04/2022 149     CHOLESTEROL, TOTAL (mg/dL)   Date Value   08/30/2013 143     HDL Cholesterol (mg/dL)   Date Value   10/26/2024 37   10/21/2023 44   10/04/2022 44     HDL CHOLESTEROL (mg/dL)   Date Value   08/30/2013 40     LDL Cholesterol (mg/dL)   Date Value   07/28/2021 80   07/28/2020 76   09/13/2019 72     LDL-CHOLESTEROL (mg/dL (calc))   Date Value   08/30/2013 76     AST (U/L)   Date Value   10/26/2024 28   10/21/2023 27   04/03/2023 30   10/04/2022 27   07/28/2021 21   07/28/2020 22   08/30/2013 28     ALT (U/L)   Date Value   10/26/2024 40   10/21/2023 51   04/03/2023 50   10/04/2022 45   07/28/2021 47   07/28/2020 46   08/30/2013 42      Current Outpatient Medications   Medication Sig Dispense Refill    Multiple Vitamin (ONE-DAILY MULTI VITAMINS) Oral Tab Take 1 tablet by mouth daily.      benzonatate 100 MG Oral Cap Take 1 capsule (100 mg total) by mouth 3 (three) times daily as needed for cough. 30 capsule 0    albuterol (PROAIR HFA) 108 (90 Base) MCG/ACT Inhalation Aero Soln Inhale 2 puffs into the lungs every 4 (four) hours as needed for Wheezing or Shortness of Breath. 1 each 0    Spacer/Aero-Holding Chambers (AEROCHAMBER MINI CHAMBER) Does not apply Device #1  aerochamber to be used with the inhaler as needed 1 each 0    azelastine 0.1 % Nasal Solution 2 sprays by Nasal route 2 (two) times daily.      montelukast 10 MG Oral Tab Take 1 tablet (10 mg total) by mouth nightly.        No Known Allergies   Past Medical History:    Allergic rhinitis    Arthritis    Carpal tunnel syndrome    CPAP (continuous positive airway pressure) dependence    ETD (eustachian tube dysfunction)    Migraine    Neuropathy    numbness and tingling right hand, ulnar issues    MATT (obstructive sleep apnea)    cpap 10?    MATT (obstructive sleep apnea)    Otalgia of left ear    Persistent disorder of initiating or maintaining wakefulness    Ulnar nerve abnormality    Unspecified sleep apnea    URI (upper respiratory infection)    Visual impairment    wears glasses for distance      Past Surgical History:   Procedure Laterality Date    Adenoidectomy      Cholecystectomy  2015    Colonoscopy  2019    Colonoscopy      Femur/knee surg unlisted      right knee    Hand/finger surgery unlisted      Laparoscopic cholecystectomy N/A 03/04/2015    Procedure: LAPAROSCOPIC CHOLECYSTECTOMY;  Surgeon: Torres Alegre MD;  Location:  MAIN OR    Other surgical history  3/9/23; 10/12/23    Trigger finger, right hand middle finger    Sinus surgery          Family History   Problem Relation Age of Onset    Depression Father     Other (ulcerative colitis) Mother     Other (colon ca) Maternal Grandmother 40    Cancer Maternal Grandmother         Colon cancer, brain cancer    Cancer Maternal Grandfather         breast cancer    Other (MI) Maternal Grandfather     Heart Disease Maternal Grandfather         Not sure if heart disease but had a heart attack    Breast Cancer Paternal Grandmother     Cancer Paternal Grandmother         Breast cancer    Other (lymphoma) Paternal Grandfather     Cancer Paternal Grandfather         Lymphoma    No Known Problems Sister       Social History:  Social History     Socioeconomic  History    Marital status:    Occupational History    Occupation: sales management   Tobacco Use    Smoking status: Never     Passive exposure: Never    Smokeless tobacco: Never   Vaping Use    Vaping status: Never Used   Substance and Sexual Activity    Alcohol use: Yes     Comment: Maybe 1/month    Drug use: No   Other Topics Concern    Caffeine Concern Yes     Comment: Wonder if it may be tied to palpitations    Stress Concern No    Weight Concern No    Special Diet No    Exercise Yes    Seat Belt Yes     Social Drivers of Health     Food Insecurity: No Food Insecurity (6/12/2025)    NCSS - Food Insecurity     Worried About Running Out of Food in the Last Year: No     Ran Out of Food in the Last Year: No   Transportation Needs: No Transportation Needs (6/12/2025)    NCSS - Transportation     Lack of Transportation: No   Housing Stability: Not At Risk (6/12/2025)    NCSS - Housing/Utilities     Has Housing: Yes     Worried About Losing Housing: No     Unable to Get Utilities: No         REVIEW OF SYSTEMS:   GENERAL: feels well otherwise  SKIN: no complaint of any unusual skin lesions  EYES: no complaint of blurred vision or double vision  HEENT: no complaint of nasal congestion, sinus pain or ST  LUNGS: no complaint of shortness of breath with exertion  CARDIOVASCULAR: no complaint of chest pain on exertion  GI: no complaint of pain, denies heartburn  : no complaint of nocturia or changes in stream  MUSCULOSKELETAL: no complaint of back pain  NEURO: no complaint of headaches  PSYCHE: no complaint of depression or anxiety   HEMATOLOGIC: denies hx of anemia    EXAM:   /76 (BP Location: Left arm, Patient Position: Sitting, Cuff Size: adult)   Pulse 76   Temp 97 °F (36.1 °C) (Temporal)   Resp 16   Ht 5' 7.36\" (1.711 m)   Wt 199 lb 1.6 oz (90.3 kg)   SpO2 97%   BMI 30.85 kg/m²   Body mass index is 30.85 kg/m².   GENERAL: well developed, well nourished,in no apparent distress  SKIN:  no suspicious  lesions   HEENT: atraumatic, normocephalic, ears are clear  EYES:PERRLA, EOMI, conjunctiva are clear  NECK: supple,no adenopathy, no thyromegaly   LUNGS: clear to auscultation  CARDIO: RRR without murmur  GI: Nondistended.  Nontender. No masses.  No organomegaly.  : deferred- denies changes.  MUSCULOSKELETAL: back is not tender,FROM of the back  EXTREMITIES: no edema or calve tenderness   NEURO: Oriented times three, cranial nerves are grossly intact,motor and sensory are grossly intact      LABS:     Lab Results   Component Value Date    WBC 7.3 10/26/2024    RBC 4.83 04/03/2023    HGB 15.5 10/26/2024    HCT 46.4 10/26/2024    MCV 91 10/26/2024    MCH 30.2 04/03/2023    MCHC 34.9 04/03/2023    RDW 12.7 04/03/2023     10/26/2024      Lab Results   Component Value Date    GLU 97 10/26/2024    BUN 14 10/26/2024    BUNCREA 16.0 07/28/2021    CREATSERUM 1.09 10/26/2024    ANIONGAP 5 04/03/2023    GFR 81 06/30/2017    GFRNAA 82 10/26/2024    GFRAA 102 07/28/2021    CA 9.0 10/26/2024    OSMOCALC 293 04/03/2023    ALKPHO 105 04/03/2023    AST 28 10/26/2024    ALT 40 10/26/2024    BILT 0.4 10/26/2024    TP 6.7 10/26/2024    ALB 3.6 04/03/2023    GLOBULIN 3.4 04/03/2023    AGRATIO 2.0 08/30/2013     04/03/2023    K 4.1 10/26/2024     10/26/2024    CO2 20 10/26/2024      Lab Results   Component Value Date    CHOLEST 172 10/26/2024    TRIG 233 10/26/2024    HDL 37 10/26/2024    LDL 80 07/28/2021    VLDL 31 (H) 07/28/2021    TCHDLRATIO 4.60 10/26/2024    NONHDLC 113 07/28/2021      Lab Results   Component Value Date    TSH 2.400 10/26/2024      Lab Results   Component Value Date     07/28/2021    A1C 5.7 10/26/2024       ASSESSMENT AND PLAN:   1. Encounter for annual physical exam  - Kelton Ba is a 47 year old male who presents for a complete physical exam. Pt's weight is Body mass index is 29.8 kg/m²., recommended low fat diet and aerobic exercise 30 minutes three times weekly.  Labs  reviewed and ordered. Vaccines reviewed. C scope UTD. Sees derm.   - CBC WITH DIFFERENTIAL WITH PLATELET; Future  - COMP METABOLIC PANEL (14); Future       2. Obesity (BMI 30-39.9)  - lifestyle changes discussed     3. Screening for cholesterol level  - lab reviewed. Doing well. Will repeat in the fall with work.     4. Screening for thyroid disorder  - stable. CTM    5. Screening for prostate cancer  - stable. CTM     The patient indicates understanding of these issues and agrees to the plan.  The patient is asked to return in 1 year for a complete physical.

## 2025-06-12 NOTE — PATIENT INSTRUCTIONS
PCV21 vaccine recommended- get a price check at pharmacy.     Check with your insurance to verify coverage for the Shingrix vaccine for shingles. Also check to see where you can go to get the vaccine. You can also get a price check at the pharmacy instead.      Start 1000 units vitamin d3 daily after the weekly presctiption is done    Repeat labs in the falls    Follow up in 1 year or sooner as needed

## 2025-07-02 ENCOUNTER — OFFICE VISIT (OUTPATIENT)
Dept: PHYSICAL THERAPY | Age: 50
End: 2025-07-02
Attending: PHYSICIAN ASSISTANT
Payer: COMMERCIAL

## 2025-07-02 PROCEDURE — 97110 THERAPEUTIC EXERCISES: CPT

## 2025-07-02 NOTE — PATIENT INSTRUCTIONS
Knee exercise progressions  -Split squat with front foot on unstable surface, 10 reps, 5 sec hold, each leg, 2 sets  -Side stepping right and left with band around thighs/shin. Perform both in a partial squat and full upright position  -Bilateral leg mini squat on flat BOSU  -Knee cable push backs in single leg stance (progress to partial single leg squat)  -Continue with leg machines

## 2025-07-02 NOTE — PROGRESS NOTES
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 Mild soreness in lateral knee, but not painful.       Pain: 0/10     Objective  Retro Step Up (6\"): R LE: WNL; L LE: 0/10 pain       Single Leg Squat: R WNL, L pain reproduction (pain free following exercises)       Assessment  Initially had pain with single leg squat on L, improved following resistive quad exercises. Good form with exercise progressions.    Goals (to be met in 14 visits)   Perform a squat >60 deg without knee pain (5 visits) Met  Perform a 8\" step up without sensation of knee buckling (8 visits) Met  Perform a full flight of stairs without sensation of knee buckling (8 visits) Nearly met       Plan  Continue advanced exercises, follow-up in 1 month.    Treatment Last 4 Visits  Treatment Day: 11 5/7/2025 5/28/2025 6/11/2025 7/2/2025   LE Treatment   Therapeutic Exercise -Step up 6\" R/L x20 reps   -Retro step up 6\" R/L x20 reps   -Quad set 10x20 sec hold   -Standing heel raise, green band around heels, x15 reps  -Seated heel raise, green band around heels, 2x10 reps  -Seated resisted ankle eversion, green band, x10 reps   -Step up 6\" R/L x20 reps   -Retro step up 6\" R/L x20 reps   -Quad set 10x20 sec hold   -Mini lunge onto airex, R/L 2x5x5 sec hold  -Resisted side stepping, green band, R/L   -Ankle eversion isometric, 3x30 sec -Step up 6\" R/L x20 reps   -Retro step up 6\" R/L x20 reps   -Quad set 10x20 sec hold   -Mini lunge onto airex, R/L 2x5x5 sec hold    -Resisted side stepping, green band, R/L   -Ankle eversion isometric, 3x30 sec    -Step up 6\" R/L x20 reps   -Retro step up 6\" R/L x20 reps   -Quad set 10x20 sec hold   -Mini lunge onto airex, R/L 2x5x5 sec hold   -Resisted side stepping, green band, R/L  -Squat on flat BOSU 10sec x15 reps  -Cable knee extension in single leg stance, 15# 2x10 reps        Manual Therapy -Cuboid PA accessory mobilization gr II x10 min      Therapeutic Exercise Minutes 30 40 30 40   Manual Therapy Minutes 10      Total Time Of Timed Procedures 40 40 30 40   Total Time Of Service-Based Procedures 0 0 0 0   Total Treatment Time 40 40 30 40   HEP Seated heel raise with green band around ankles to facilitate evertors, 2x10 reps   Add: cable knee extension single leg stance; squat on flat BOSU        HEP  Add: cable knee extension single leg stance; squat on flat BOSU    Charges     TherEx x3

## 2025-07-30 ENCOUNTER — OFFICE VISIT (OUTPATIENT)
Dept: PHYSICAL THERAPY | Age: 50
End: 2025-07-30
Attending: PHYSICIAN ASSISTANT
Payer: COMMERCIAL

## 2025-07-30 PROCEDURE — 97110 THERAPEUTIC EXERCISES: CPT

## (undated) NOTE — LETTER
06/08/21    Alli Dandre  [address] [facility] [phone]    Dear Renetta Covarrubias,    This is a friendly reminder you have outstanding lab work as listed below. To schedule an appointment please call Central Scheduling at [phone].  You may also sched

## (undated) NOTE — LETTER
08/05/19        John Kebede  [phone] [place] [address]      Dear Koko Townsend,    [facility] records indicate that you have outstanding lab work and or testing that was ordered for you and has not yet been completed:  Orders Placed This Encounter      CBC

## (undated) NOTE — LETTER
Patient Name: Alli Amos  YOB: 1975          MRN number:  [id number]  Date:  3/3/2021  Referring Physician:   Tamie Hastings    Discharge Summary  Number of Visits Attended 8 in Physical Therapy    Dear Dr. Rolin Dubin,    Assessment: Rakesh contreras

## (undated) NOTE — LETTER
Patient Name: Juan R Gorman  YOB: 1975          MRN number:  [id number]  Date:  10/1/2020  Referring Physician:  Royal Esteban    Discharge Summary  Pt has attended 10 visits in Physical Therapy.      Assessment: Good progress since startin

## (undated) NOTE — LETTER
07/05/19        Rodo Rider  [phone] [place] [address]      Dear Conrado Rodas,    [facility] records indicate that you have outstanding lab work and or testing that was ordered for you and has not yet been completed:  Orders Placed This Encounter      CBC

## (undated) NOTE — Clinical Note
FYI, TCM template completed.  Patient Covid 19 positive, TE sent for HFU virtual visit or telephone follow up

## (undated) NOTE — MR AVS SNAPSHOT
[address] [address] [phone]               Thank you for choosing us for your health care visit with SHANNON Jason.   We are glad to serve you and happy to provide you wi - Amoxicillin-Pot Clavulanate 875-125 MG Tabs            MyChart     Visit [facility]  You can access your MyChart to more actively manage your health care and view more details from this visit by going to [email or web address].   If you've recently ha

## (undated) NOTE — LETTER
Patient Name: Jean Marie Manzo  YOB: 1975          MRN number:  [id number]  Date:  1/22/2021  Referring Physician: Eusebia Sorensen MD       UPPER EXTREMITY EVALUATION:    Referring Provider:  Eusebia Sorensen MD   Referring Diagnosis: Wrist pain (M2 • Carpal tunnel syndrome    • CPAP (continuous positive airway pressure) dependence    • ETD (eustachian tube dysfunction)    • Migraine    • Neuropathy     numbness and tingling right hand, ulnar issues   • MATT (obstructive sleep apnea)     cpap 10?    • O Palpation: Generally tender along bilateral forearms, increased at right common wrist extensors with pain radiating distally    Special tests:   Spurling's R (-), L (-)  Distraction (-)  Upper Limb Tension Test: R: reproduced hand symptoms with full wrist Thank you for your referral. Please co-sign or sign and return this letter via fax as soon as possible to [phone].  If you have any questions, please contact me at Dept: [phone]    Sincerely,  Electronically signed by therapist: Aditya Hutchison PT,

## (undated) NOTE — MR AVS SNAPSHOT
[address] [address] [phone]               Thank you for choosing us for your health care visit with Qing Avalos DO.   We are glad to serve you and happy to provide you with th discharge instructions in [facility] [employer] by going to Visits < Admission Summaries. If you've been to the Emergency Department or your doctor's office, you can view your past visit information in [employer] by going to Visits < Visit Summaries. "[employer] questions?

## (undated) NOTE — LETTER
09/05/19        Lexie Toussaint  [address] [address]      Dear Marin Malone,    [facility] records indicate that you have outstanding lab work and or testing that was ordered for you and has not yet been completed:  Orders Placed This Encounter      CBC

## (undated) NOTE — LETTER
19    Patient: Duyen Her  : 1975 Visit date: 2019    Dear  Dr. Clive Urias MD,    Thank you for referring Duyen Celis to my practice. Please find my assessment and plan below.                 Assessment   Encounter for colo

## (undated) NOTE — MR AVS SNAPSHOT
[address] [place] [id number] [phone]               Thank you for choosing us for your health care visit with Ines Watt DO.   We are glad to serve you and happy to provide you with th [email or web address] with a [email or web address]. If you've recently had a stay at the Hospital you can access your discharge instructions in [facility] by going to Visits < Admission Summaries.  If you've been to the Emergency Department or your doctor's office, you can view yo Visit [facility] online at  [email or web address]

## (undated) NOTE — MR AVS SNAPSHOT
[address] [address] [phone]               Thank you for choosing us for your health care visit with Margie Galindo DO.   We are glad to serve you and happy to provide you with th Commonly known as:  CLEOCIN T           Fluticasone Propionate 50 MCG/ACT Susp   2 sprays daily   Commonly known as:  FLONASE           Moxifloxacin HCl 400 MG Tabs   Take 1 tablet (400 mg total) by mouth daily.    Commonly known as:  Claudette Cooper